# Patient Record
Sex: FEMALE | Race: WHITE | NOT HISPANIC OR LATINO | Employment: OTHER | URBAN - METROPOLITAN AREA
[De-identification: names, ages, dates, MRNs, and addresses within clinical notes are randomized per-mention and may not be internally consistent; named-entity substitution may affect disease eponyms.]

---

## 2018-03-27 ENCOUNTER — HOSPITAL ENCOUNTER (EMERGENCY)
Facility: CLINIC | Age: 68
Discharge: HOME | End: 2018-03-27
Attending: EMERGENCY MEDICINE | Admitting: EMERGENCY MEDICINE
Payer: COMMERCIAL

## 2018-03-27 ENCOUNTER — APPOINTMENT (OUTPATIENT)
Dept: GENERAL RADIOLOGY | Facility: CLINIC | Age: 68
End: 2018-03-27
Attending: EMERGENCY MEDICINE
Payer: COMMERCIAL

## 2018-03-27 ENCOUNTER — APPOINTMENT (OUTPATIENT)
Dept: CT IMAGING | Facility: CLINIC | Age: 68
End: 2018-03-27
Attending: EMERGENCY MEDICINE
Payer: COMMERCIAL

## 2018-03-27 VITALS
TEMPERATURE: 97.9 F | HEART RATE: 81 BPM | DIASTOLIC BLOOD PRESSURE: 82 MMHG | OXYGEN SATURATION: 95 % | HEIGHT: 65 IN | BODY MASS INDEX: 29.99 KG/M2 | WEIGHT: 180 LBS | SYSTOLIC BLOOD PRESSURE: 165 MMHG | RESPIRATION RATE: 19 BRPM

## 2018-03-27 DIAGNOSIS — Z79.01 CHRONIC ANTICOAGULATION: ICD-10-CM

## 2018-03-27 DIAGNOSIS — S01.81XA FACIAL LACERATION, INITIAL ENCOUNTER: ICD-10-CM

## 2018-03-27 DIAGNOSIS — S09.90XA CLOSED HEAD INJURY, INITIAL ENCOUNTER: ICD-10-CM

## 2018-03-27 DIAGNOSIS — S60.222A CONTUSION OF LEFT HAND, INITIAL ENCOUNTER: ICD-10-CM
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
## 2018-03-27 DIAGNOSIS — D64.9 ANEMIA, UNSPECIFIED TYPE: ICD-10-CM

## 2018-03-27 DIAGNOSIS — E87.6 HYPOKALEMIA: ICD-10-CM

## 2018-03-27 DIAGNOSIS — R42 DIZZINESS: ICD-10-CM

## 2018-03-27 DIAGNOSIS — S61.412A SKIN TEAR OF LEFT HAND WITHOUT COMPLICATION, INITIAL ENCOUNTER: ICD-10-CM

## 2018-03-27 LAB
ANION GAP SERPL CALCULATED.3IONS-SCNC: 10 MMOL/L (ref 3–14)
BASOPHILS # BLD AUTO: 0 10E9/L (ref 0–0.2)
BASOPHILS NFR BLD AUTO: 0.2 %
BUN SERPL-MCNC: 12 MG/DL (ref 7–30)
CALCIUM SERPL-MCNC: 8.7 MG/DL (ref 8.5–10.1)
CHLORIDE SERPL-SCNC: 100 MMOL/L (ref 94–109)
CO2 SERPL-SCNC: 24 MMOL/L (ref 20–32)
CREAT SERPL-MCNC: 0.84 MG/DL (ref 0.52–1.04)
DIFFERENTIAL METHOD BLD: ABNORMAL
EOSINOPHIL # BLD AUTO: 0.1 10E9/L (ref 0–0.7)
EOSINOPHIL NFR BLD AUTO: 0.8 %
ERYTHROCYTE [DISTWIDTH] IN BLOOD BY AUTOMATED COUNT: 22.1 % (ref 10–15)
GFR SERPL CREATININE-BSD FRML MDRD: 68 ML/MIN/1.7M2
GLUCOSE SERPL-MCNC: 142 MG/DL (ref 70–99)
HCT VFR BLD AUTO: 29.5 % (ref 35–47)
HEMOCCULT STL QL: NEGATIVE
HGB BLD-MCNC: 9.3 G/DL (ref 11.7–15.7)
IMM GRANULOCYTES # BLD: 0 10E9/L (ref 0–0.4)
IMM GRANULOCYTES NFR BLD: 0.2 %
INR PPP: 1.73 (ref 0.86–1.14)
INTERPRETATION ECG - MUSE: NORMAL
LYMPHOCYTES # BLD AUTO: 2.7 10E9/L (ref 0.8–5.3)
LYMPHOCYTES NFR BLD AUTO: 22.1 %
MCH RBC QN AUTO: 24 PG (ref 26.5–33)
MCHC RBC AUTO-ENTMCNC: 31.5 G/DL (ref 31.5–36.5)
MCV RBC AUTO: 76 FL (ref 78–100)
MONOCYTES # BLD AUTO: 0.8 10E9/L (ref 0–1.3)
MONOCYTES NFR BLD AUTO: 6.3 %
NEUTROPHILS # BLD AUTO: 8.4 10E9/L (ref 1.6–8.3)
NEUTROPHILS NFR BLD AUTO: 70.4 %
NRBC # BLD AUTO: 0 10*3/UL
NRBC BLD AUTO-RTO: 0 /100
PLATELET # BLD AUTO: 337 10E9/L (ref 150–450)
POTASSIUM SERPL-SCNC: 3.3 MMOL/L (ref 3.4–5.3)
RBC # BLD AUTO: 3.87 10E12/L (ref 3.8–5.2)
SODIUM SERPL-SCNC: 134 MMOL/L (ref 133–144)
WBC # BLD AUTO: 12 10E9/L (ref 4–11)

## 2018-03-27 PROCEDURE — 12011 RPR F/E/E/N/L/M 2.5 CM/<: CPT

## 2018-03-27 PROCEDURE — 99285 EMERGENCY DEPT VISIT HI MDM: CPT | Mod: 25

## 2018-03-27 PROCEDURE — 71046 X-RAY EXAM CHEST 2 VIEWS: CPT

## 2018-03-27 PROCEDURE — 85610 PROTHROMBIN TIME: CPT | Performed by: EMERGENCY MEDICINE

## 2018-03-27 PROCEDURE — 96374 THER/PROPH/DIAG INJ IV PUSH: CPT

## 2018-03-27 PROCEDURE — 82272 OCCULT BLD FECES 1-3 TESTS: CPT | Performed by: EMERGENCY MEDICINE

## 2018-03-27 PROCEDURE — 96375 TX/PRO/DX INJ NEW DRUG ADDON: CPT | Mod: 59

## 2018-03-27 PROCEDURE — 93005 ELECTROCARDIOGRAM TRACING: CPT | Mod: XU

## 2018-03-27 PROCEDURE — 80048 BASIC METABOLIC PNL TOTAL CA: CPT | Performed by: EMERGENCY MEDICINE

## 2018-03-27 PROCEDURE — 25000128 H RX IP 250 OP 636

## 2018-03-27 PROCEDURE — 85025 COMPLETE CBC W/AUTO DIFF WBC: CPT | Performed by: EMERGENCY MEDICINE

## 2018-03-27 PROCEDURE — 73130 X-RAY EXAM OF HAND: CPT | Mod: LT

## 2018-03-27 PROCEDURE — 25000132 ZZH RX MED GY IP 250 OP 250 PS 637: Performed by: EMERGENCY MEDICINE

## 2018-03-27 PROCEDURE — 70450 CT HEAD/BRAIN W/O DYE: CPT

## 2018-03-27 RX ORDER — HYDROMORPHONE HYDROCHLORIDE 1 MG/ML
INJECTION, SOLUTION INTRAMUSCULAR; INTRAVENOUS; SUBCUTANEOUS
Status: COMPLETED
Start: 2018-03-27 | End: 2018-03-27

## 2018-03-27 RX ORDER — WARFARIN SODIUM 5 MG/1
5 TABLET ORAL
Status: ON HOLD | COMMUNITY
End: 2024-01-25

## 2018-03-27 RX ORDER — HYDROCODONE BITARTRATE AND ACETAMINOPHEN 5; 325 MG/1; MG/1
1 TABLET ORAL ONCE
Status: COMPLETED | OUTPATIENT
Start: 2018-03-27 | End: 2018-03-27

## 2018-03-27 RX ORDER — POTASSIUM CHLORIDE 1500 MG/1
40 TABLET, EXTENDED RELEASE ORAL ONCE
Status: COMPLETED | OUTPATIENT
Start: 2018-03-27 | End: 2018-03-27

## 2018-03-27 RX ORDER — HYDROMORPHONE HYDROCHLORIDE 1 MG/ML
0.5 INJECTION, SOLUTION INTRAMUSCULAR; INTRAVENOUS; SUBCUTANEOUS
Status: COMPLETED | OUTPATIENT
Start: 2018-03-27 | End: 2018-03-27

## 2018-03-27 RX ORDER — HYDROCODONE BITARTRATE AND ACETAMINOPHEN 5; 325 MG/1; MG/1
1-2 TABLET ORAL EVERY 4 HOURS PRN
Qty: 16 TABLET | Refills: 0 | Status: SHIPPED | OUTPATIENT
Start: 2018-03-27 | End: 2022-11-09

## 2018-03-27 RX ORDER — ONDANSETRON 2 MG/ML
4 INJECTION INTRAMUSCULAR; INTRAVENOUS EVERY 30 MIN PRN
Status: DISCONTINUED | OUTPATIENT
Start: 2018-03-27 | End: 2018-03-28 | Stop reason: HOSPADM

## 2018-03-27 RX ORDER — ONDANSETRON 2 MG/ML
INJECTION INTRAMUSCULAR; INTRAVENOUS
Status: COMPLETED
Start: 2018-03-27 | End: 2018-03-27

## 2018-03-27 RX ORDER — POTASSIUM CHLORIDE 1.5 G/1.58G
40 POWDER, FOR SOLUTION ORAL ONCE
Status: DISCONTINUED | OUTPATIENT
Start: 2018-03-27 | End: 2018-03-27

## 2018-03-27 RX ADMIN — HYDROMORPHONE HYDROCHLORIDE 0.5 MG: 1 INJECTION, SOLUTION INTRAMUSCULAR; INTRAVENOUS; SUBCUTANEOUS at 19:43

## 2018-03-27 RX ADMIN — ONDANSETRON 4 MG: 2 INJECTION INTRAMUSCULAR; INTRAVENOUS at 19:42

## 2018-03-27 RX ADMIN — POTASSIUM CHLORIDE 40 MEQ: 1500 TABLET, EXTENDED RELEASE ORAL at 22:44

## 2018-03-27 RX ADMIN — HYDROCODONE BITARTRATE AND ACETAMINOPHEN 1 TABLET: 5; 325 TABLET ORAL at 22:32

## 2018-03-27 ASSESSMENT — ENCOUNTER SYMPTOMS
CONSTITUTIONAL NEGATIVE: 1
BRUISES/BLEEDS EASILY: 1
DIARRHEA: 0
NAUSEA: 1
LIGHT-HEADEDNESS: 1
CONSTIPATION: 0
VOMITING: 0
WOUND: 1
SHORTNESS OF BREATH: 0
HEADACHES: 0

## 2018-03-27 NOTE — ED AVS SNAPSHOT
Emergency Department    [address] [place] [address]    Phone:  [phone]    Fax:  [phone]                                       Grace Jorgensen   MRN: [id number]    Department:   Emergency Department   Date of Visit:  3/27/2018           After Visit Summary Signature Page     I have received my discharge instructions, and my questions have been answered. I have discussed any challenges I see with this plan with the nurse or doctor.    ..........................................................................................................................................  Patient/Patient Representative Signature      ..........................................................................................................................................  Patient Representative Print Name and Relationship to Patient    ..................................................               ................................................  Date                                            Time    ..........................................................................................................................................  Reviewed by Signature/Title    ...................................................              ..............................................  Date                                                            Time

## 2018-03-27 NOTE — ED PROVIDER NOTES
History     Chief Complaint:  Fall     HPI   Grace Joregnsen is a diabetic 68 year old female with a history of CVA with residual mild balance difficulties on warfarin who presents with family for evaluation of a fall. The patient was walking outside earlier this afternoon around 1530 when she became lightheaded and her legs gave out on her. No other precipitating symptoms. She fell onto her left side and struck her left head and left chest wall during the fall with associated pleuritic left chest wall pain. No dyspnea. She also sustained a laceration over her left temple, a skin tear over her left 5th finger, and scattered abrasions/bruises over her left arm. This fall was not witnessed but she denies loss of consciousness. Her only other complaint is mild nausea. No dentition changes, tongue bite, headache, neck pain, focal numbness or weakness, bowel or bladder changes, or visual deficits. Son brought her here and while in triage prior to evaluation she became lightheaded and near syncopal. Son and staff member caught her before any fall. Son notes patient appears mildly pale since arriving here. No recent illness or travel. No other new symptoms or trauma. Last INR was 1-2 weeks ago and 2.5 per patient's report. She believes here tetanus is out of date.      Allergies:  No known drug allergies    Medications:    Warfarin  Nicotine patch  Oral antihyperglycemic   Iron supplement    Past Medical History:    CVA, multiple, circa 2013  diabetes mellitus    Past Surgical History:    Shoulder arthroplasty, left    Family History:    History reviewed. No pertinent family history.      Social History:  The patient denies tobacco, alcohol, or drug use.        Review of Systems   Constitutional: Negative.    HENT: Negative for dental problem.    Eyes: Negative for visual disturbance.   Respiratory: Negative for shortness of breath.    Cardiovascular: Positive for chest pain.   Gastrointestinal: Positive for nausea.  "Negative for constipation, diarrhea and vomiting.   Genitourinary: Negative.    Musculoskeletal: Positive for gait problem (chronic).   Skin: Positive for pallor and wound.   Neurological: Positive for light-headedness. Negative for syncope and headaches.   Hematological: Bruises/bleeds easily.   All other systems reviewed and are negative.      Physical Exam     Patient Vitals for the past 24 hrs:   BP Temp Temp src Pulse Heart Rate Resp SpO2 Height Weight   03/27/18 2215 - - - - - - 95 % - -   03/27/18 2145 - - - - - - 98 % - -   03/27/18 2130 165/82 - - - 74 - 99 % - -   03/27/18 2115 - - - - 80 - 98 % - -   03/27/18 2100 146/68 - - - 77 19 97 % - -   03/27/18 2045 - - - - 74 18 99 % - -   03/27/18 2030 157/66 - - - 71 24 97 % - -   03/27/18 2015 - - - - 81 19 91 % - -   03/27/18 2000 155/74 - - - 74 12 97 % - -   03/27/18 1945 - - - - 75 16 97 % - -   03/27/18 1930 (!) 140/91 - - - - 26 - - -   03/27/18 1900 135/66 - - - 80 15 94 % - -   03/27/18 1834 (!) 123/94 97.9  F (36.6  C) Oral 81 - 16 96 % 1.651 m (5' 5\") 81.6 kg (180 lb)        Physical Exam  SKIN:  Warm, dry.  Left hand skin avulsion at ulnar aspect, clean.  Ecchymosis and small abrasions of the dorsal left hand at the 4th MCP joint.    HEMATOLOGIC/IMMUNOLOGIC/LYMPHATIC:  No pallor.  No edema.  HENT:  No scalp trauma.  Lacerations at the left temple:  1 cm and 0.5 cm, both clean and no foreign body or active bleeding.  No oral or dental trauma.  EYES:  PERRL, no ocular trauma, normal EOM.  CARDIOVASCULAR:  Normal rate and a regular rhythm.  No murmur.    RESPIRATORY:  No respiratory distress, breath sounds equal and normal.  GASTROINTESTINAL:  Soft nontender abdomen.  MUSCULOSKELETAL:  Full painless ROM of the head/neck/limbs.  Tender left hand at the 4th MCP joint.  Non-tender left wrist.  Cervical spine non-tender.  ROM torso and palpation of left low lateral thorax exacerbates the chest wall pain.  NEUROLOGIC:  Alert, conversant, GCS 15.  Oriented.  " No aphasia or dysarthria.  No gross motor or sensory deficit.  PSYCHIATRIC:  Normal mood.    Emergency Department Course   ECG:  ECG (19:06:37):  Indication: lightheadedness    Rate 77 bpm. SC interval 166. QRS duration 90. QT/QTc 408/461. P-R-T axes 73, 39, 58.   Normal sinus rhythm  Cannot rule out anterior infarct, age undetermined  abnormal ECG  agree with computer interpretation  No previous ECGs available for comparison.    Interpreted at 1925 by Cale Grant MD.    Imaging:  Radiographic findings were communicated with the patient and family who voiced understanding of the findings.  CT Head w/o contrast:  1. Cutaneous injury overlying the left zygomatic arch without definite underlying fracture although the facial bones are not entirely included on these images.  2. Focal cortical hypodensity in the right cerebellum has appearance of a chronic infarct although a more acute area of ischemia cannot be excluded.  3. No evidence of acute intracranial hemorrhage, mass, or herniation.  4. Mild diffuse parenchymal volume loss and white matter changes likely due to chronic microvascular ischemic disease.  Results per Radiology.     XR Chest 2 views:   Streaky left basilar opacity has the appearance of atelectasis. There may be a small left pleural effusion although the costophrenic angles are not included on the lateral view. No definite displaced rib fracture is identified although the images are suboptimal to evaluate for rib fractures. Postoperative changes of left shoulder prosthesis. Cardiac silhouette borderline enlarged. No pneumothorax visualized. Results per Radiology.      XR Hand, Left:  Bones appear osteopenic. No lucent fracture line identified. No significant degenerative changes. Bony alignment within normal limits. Results per Radiology.     Procedure:    Narrative: Procedure: Laceration Repair        LACERATION:  Simple and superficial clean 1 cm and 0.5 cm lacerations.      LOCATION:  Left  temple      ANESTHESIA:  Local using 0.25% marcaine plain total of 2 mLs      PREPARATION:  Irrigation and Scrubbing with Normal Saline and Shur Clens      DEBRIDEMENT:  no debridement      CLOSURE:  Wounds were closed with One Layer.  Skin closed with 3 x 5.0 Ethylon using interrupted sutures to the larger wound, and 1x5.0 Ethylon to the smaller wound.    Laboratory:  Laboratory findings were communicated with the patient and family who voiced understanding of the findings.  CBC w/diff: WBC 12.0 (H), Hgb 9.3 (L), Hct 29.6 (L), ANC 8.4 (H), o/w WNL (Plt 337)  BMP: Glu 142 (H), K 3.3 (L), o/w WNL (Cr 0.84)  INR: 1.73  POC Occult blood stool: negative      Interventions:  1942: ondansetron 4mg, IV  1943: hydromorphone (Dilaudid) 0.5mg, IV  2232: Norco 5-325 mg tablet, PO   2244: KCl 40mEq, PO    Emergency Department Course:  Past medical records, nursing notes, and vitals reviewed.   1845: I performed an exam of the patient as documented above.    Peripheral IV access established. Blood drawn and sent. The above EKG, imaging study, and labs  were obtained. Results above.  Tetanus vaccination status reviewed: tetanus status unknown to the patient, Td vaccination indicated and given today.     2007: I rechecked patient. Updated on findings and plan. Chaperoned rectal exam performed as above.  2042: I rechecked patient. Laceration cleansed, closed, and dressed as above.   The patient was ambulated here in the emergency department without significant difficulty.  2223: I rechecked patient. Findings and plan explained to the Patient and family. Patient discharged home with instructions regarding supportive care, medications, and reasons to return. The importance of close follow-up was reviewed.            Impression & Plan    Medical Decision Making:  Grace Jorgensen is a 68 year old female presents after fall from standing height which was induced by a dizzy episode. Given her anticoagulation and head/facial injury  imaging was performed as above, which was negative. The facial wounds were repaired as above. She also suffered a left hand skin avulsion and imaging performed to screen for fracture, which was negative. Otherwise incidentally noted to be anemic and with her anticoagulation opted for rectal exam which was hemoccult negative. So I do not think she is suffering from GI bleeding causing her episode. Incidental hypokalemia which was replaced orally. Given chest pain with palpation, rib fracture or pneumothorax were concerns. See imaging. In the meantime she was resting and ambulated and felt well enough to be discharged. She was administered a dose of Norco and I prescribed another 16 tablets and otherwise advised to return for any concerns.    Diagnosis:    ICD-10-CM    1. Fall, initial encounter W19.XXXA    2. Dizziness R42    3. Closed head injury, initial encounter S09.90XA    4. Facial laceration, initial encounter S01.81XA    5. Skin tear of left hand without complication, initial encounter S61.412A    6. Contusion of left hand, initial encounter S60.222A    7. Hypokalemia E87.6    8. Anemia, unspecified type D64.9    9. Chronic anticoagulation Z79.01        Disposition:   discharged to home    Discharge Medications:  Discharge Medication List as of 3/27/2018 10:47 PM      START taking these medications    Details   HYDROcodone-acetaminophen (NORCO) 5-325 MG per tablet Take 1-2 tablets by mouth every 4 hours as needed for moderate to severe pain, Disp-16 tablet, R-0, Local Print              Scribe Disclosure:  I, Kristofer Bundy, am serving as a scribe at 6:50 PM on 3/27/2018 to document services personally performed by Cale Grant MD based on my observations and the provider's statements to me.    Kristofer Bundy  3/27/2018   EMERGENCY DEPARTMENT      Cale Grant MD  03/28/18 5251

## 2018-03-27 NOTE — ED AVS SNAPSHOT
Emergency Department    [address] [place] [id number]    Phone:  [phone]    Fax:  [phone]                                       Grace Jorgensen   MRN: [id number]    Department:   Emergency Department   Date of Visit:  3/27/2018           Patient Information     Date Of Birth          1950        Your diagnoses for this visit were:     Fall, initial encounter     Dizziness     Closed head injury, initial encounter     Facial laceration, initial encounter     Skin tear of left hand without complication, initial encounter     Contusion of left hand, initial encounter     Hypokalemia     Anemia, unspecified type     Chronic anticoagulation        You were seen by Cale Grant MD.      Follow-up Information     Please follow up.    Why:  norco or tylenol and ice for pain - use the walker - return if any concerns        Discharge Instructions         Dizziness (Uncertain Cause)  Dizziness is a common symptom. It may be described as lightheadedness, spinning, or feeling like you are going to faint. Dizziness can have many causes.  Be sure to tell the healthcare provider about:    All medicines you take, including prescription, over-the-counter, herbs, and supplements    Any other symptoms you have    Any health problems you are being treated for    Anything that causes the dizziness to get worse or better  Today's exam did not show an exact cause for your dizziness. Other tests may be needed. Follow up with your healthcare provider.  Home care    Dizziness that occurs with sudden standing may be a sign of mild dehydration. Drink extra fluids for the next few days.    If you recently started a new medicine, stopped a medicine, or had the dose of a current medicine changed, talk with the prescribing healthcare provider. Your medicine plan may need adjustment.    If dizziness lasts more than a few seconds, sit or lie down until it passes. This may help prevent injury in case you  pass out.    Do not drive or use power tools or dangerous equipment until you have had no dizziness for at least 48 hours.  Follow-up care  Follow up with your healthcare provider for further evaluation within the next 7 days or as advised.  When to seek medical advice  Call your healthcare provider for any of the following:    Worsening of symptoms or new symptoms    Passing out or seizure    Repeated vomiting    Headache    Palpitations (the sense that your heart is fluttering or beating fast or hard)    Shortness of breath    Blood in vomit or stool (black or red color)    Weakness of an arm or leg or one side of the face    Vision or hearing changes    Trouble walking or speaking    Chest, arm, neck, back, or jaw pain  Date Last Reviewed: 8/23/2015 2000-2017 [employer]. [address] [place] [address]. All rights reserved. This information is not intended as a substitute for professional medical care. Always follow your healthcare professional's instructions.    Discharge Instructions  Head Injury    You have been seen today for a head injury. Your evaluation included a history and physical examination. You may have had a CT (CAT) scan performed, though most head injuries do not require a scan. Based on this evaluation, your provider today does not feel that your head injury is serious.    Generally, every Emergency Department visit should have a follow-up clinic visit with either a primary or a specialty clinic/provider. Please follow-up as instructed by your emergency provider today.  Return to the Emergency Department if:    You are confused or you are not acting right.    Your headache gets worse or you start to have a really bad headache even with your recommended treatment plan.    You vomit (throw up) more than once.    You have a seizure.    You have trouble walking.    You have weakness or paralysis (cannot move) in an arm or a leg.    You have blood or fluid coming from your  ears or nose.    You have new symptoms or anything that worries you.    Sleeping:  It is okay for you to sleep, but someone should wake you up if instructed by your provider, and someone should check on you at your usual time to wake up.     Activity:    Do not drive for at least 24 hours.    Do not drive if you have dizzy spells or trouble concentrating, or remembering things.    Do not return to any contact sports until cleared by your regular provider.     MORE INFORMATION:    Concussion:  A concussion is a minor head injury that may cause temporary problems with the way the brain works. Although concussions are important, they are generally not an emergency or a reason that a person needs to be hospitalized. Some concussion symptoms include confusion, amnesia (forgetful), nausea (sick to your stomach) and vomiting (throwing up), dizziness, fatigue, memory or concentration problems, irritability and sleep problems. For most people, concussions are mild and temporary but some will have more severe and persistent symptoms that require on-going care and treatment.  CT Scans: Your evaluation today may have included a CT scan (CAT scan) to look for things like bleeding or a skull fracture (broken bone).  CT scans involve radiation and too many CT scans can cause serious health problems like cancer, especially in children.  Because of this, your provider may not have ordered a CT scan today if they think you are at low risk for a serious or life threatening problem.    If you were given a prescription for medicine here today, be sure to read all of the information (including the package insert) that comes with your prescription.  This will include important information about the medicine, its side effects, and any warnings that you need to know about.  The pharmacist who fills the prescription can provide more information and answer questions you may have about the medicine.  If you have questions or concerns that the  pharmacist cannot address, please call or return to the Emergency Department.     Remember that you can always come back to the Emergency Department if you are not able to see your regular provider in the amount of time listed above, if you get any new symptoms, or if there is anything that worries you.    Discharge Instructions  Laceration (Cut)    You were seen today for a laceration (cut).  Your provider examined your laceration for any problems such a buried foreign body (like glass, a splinter, or gravel), or injury to blood vessels, tendons, and nerves.  Your provider may have also rinsed and/or scrubbed your laceration to help prevent an infection. It may not be possible to find all problems with your laceration on the first visit; occasionally foreign bodies or a tendon injury can go undetected.    Your laceration may have been closed in one of several ways:    No closure: many wounds will heal just fine without closure.    Stitches: regular stitches that require removal.    Staples: skin staples are often used in the scalp/head.    Wound adhesive (glue): skin glue can be used for certain lacerations and doesn t require removal.    Wound strips (aka Butterfly bandages or steri-strips): these are bandages that help to close a wound.    Absorbable stitches:  dissolving  stitches that go away on their own and usually don t require removal.    A small percentage of wounds will develop an infection regardless of how well the wound is cared for. Antibiotics are generally not indicated to prevent an infection so are only given for a small number of high-risk wounds. Some lacerations are too high risk to close, and are left open to heal because closure can increase the likelihood that an infection will develop.    Remember that all lacerations, no matter how expertly repaired, will cause scarring. We consider many factors, techniques, and materials, in our efforts to provide the best possible cosmetic  outcome.    Generally, every Emergency Department visit should have a follow-up clinic visit with either a primary or a specialty clinic/provider. Please follow-up as instructed by your emergency provider today.     Return to the Emergency Department right away if:    You have more redness, swelling, pain, drainage (pus), a bad smell, or red streaking from your laceration as these symptoms could indicate an infection.    You have a fever of 100.4 F or more.    You have bleeding that you cannot stop at home. If your cut starts to bleed, hold pressure on the bleeding area with a clean cloth or put pressure over the bandage.  If the bleeding does not stop after using constant pressure for 30 minutes, you should return to the Emergency Department for further treatment.    An area past the laceration is cool, pale, or blue compared with the other side, or has a slower return of color when squeezed.    Your dressing seems too tight or starts to get uncomfortable or painful. For children, signs of a problem might be irritability or restlessness.    You have loss of normal function or use of an area, such as being unable to straighten or bend a finger normally.    You have a numb area past the laceration.    Return to the Emergency Department or see your regular provider if:    The laceration starts to come open.     You have something coming out of the cut or a feeling that there is something in the laceration.    Your wound will not heal, or keeps breaking open. There can always be glass, wood, dirt or other things in any wound.  They will not always show up, even on x-rays.  If a wound does not heal, this may be why, and it is important to follow-up with your regular provider.    Home Care:    Take your dressing off in 12-24 hours, or as instructed by your provider, to check your laceration. Remove the dressing sooner if it seems too tight or painful, or if it is getting numb, tingly, or pale past the dressing.    Gently  wash your laceration 1-2 times daily with clean water and mild soap. It is okay to shower or run clean water over the laceration, but do not let the laceration soak in water (no swimming).    If your laceration was closed with wound adhesive or strips: pat it dry and leave it open to the air. For all other repairs: after you wash your laceration, or at least 2 times a day, apply antibiotic ointment (such as Neosporin  or Bacitracin ) to the laceration, then cover it with a Band-Aid  or gauze.    Keep the laceration clean. Wear gloves or other protective clothing if you are around dirt.    Follow-up for removal:    If your wound was closed with staples or regular stitches, they need to be removed according to the instructions and timeline specified by your provider today.    If your wound was closed with absorbable ( dissolving ) sutures, they should fall out, dissolve, or not be visible in about one week. If they are still visible, then they should be removed according to the instructions and timeline specified by your provider today.    Scars:  To help minimize scarring:    Wear sunscreen over the healed laceration when out in the sun.    Massage the area regularly once healed.    You may apply Vitamin E to the healed wound.    Wait. Scars improve in appearance over months and years.    If you were given a prescription for medicine here today, be sure to read all of the information (including the package insert) that comes with your prescription.  This will include important information about the medicine, its side effects, and any warnings that you need to know about.  The pharmacist who fills the prescription can provide more information and answer questions you may have about the medicine.  If you have questions or concerns that the pharmacist cannot address, please call or return to the Emergency Department.       Remember that you can always come back to the Emergency Department if you are not able to see your  regular provider in the amount of time listed above, if you get any new symptoms, or if there is anything that worries you.      Hypokalemia  Hypokalemia means a low level of potassium in the blood. This most often occurs in people who take diuretics (water pills). It can also occur due to severe vomiting or diarrhea.  It is also seen in people who take laxatives for long periods of time. It can sometimes be associated with hypomagnesemia (low magnesium) which needs to be corrected before your potassium levels can be fixed.  A mild case usually causes no symptoms. It is only found with blood testing. More severe potassium loss causes generalized weakness, muscle or abdominal cramping, heart palpitations (rapid or irregular heartbeats), low blood pressure, specific muscle weakness, and in some people who are paralyzed.   Home care    Take any potassium supplements prescribed.    Eat foods rich in potassium. The highest amount is found in avocado, baked potatoes, spinach, cantaloupe, cod, halibut, salmon, and scallops. White, red, or weathers beans are also very good sources. A modest amount is found in orange juice, bananas, carrots, and tomato juice.    If you take certain types of diuretics, you will also need to take potassium supplements. If you take a diuretic, discuss potassium supplements with your doctor.  Follow-up care  Follow up with your healthcare provider for a repeat blood test within the next week or as advised by our staff.  When to seek medical advice  Call your healthcare provider if any of the following occur:    Increased weakness, fatigue, muscle cramps    Dizziness  Call 911  Call 911 if any of the following occur:    Irregular heartbeat, extra beats or very fast heart rate    Loss of consciousness  Date Last Reviewed: 7/1/2017 2000-2017 [employer]. [address] [place] [address], [place] [address]. All rights reserved. This information is not intended as a substitute for professional  medical care. Always follow your healthcare professional's instructions.          24 Hour Appointment Hotline       To make an appointment at any [facility], call [phone] ([phone]). If you don't have a family doctor or clinic, we will help you find one. [facility] are conveniently located to serve the needs of you and your family.             Review of your medicines      START taking        Dose / Directions Last dose taken    HYDROcodone-acetaminophen 5-325 MG per tablet   Commonly known as:  NORCO   Dose:  1-2 tablet   Quantity:  16 tablet        Take 1-2 tablets by mouth every 4 hours as needed for moderate to severe pain   Refills:  0          Our records show that you are taking the medicines listed below. If these are incorrect, please call your family doctor or clinic.        Dose / Directions Last dose taken    WARFARIN SODIUM PO   Dose:  5 mg        Take 5 mg by mouth 2 times daily   Refills:  0                Prescriptions were sent or printed at these locations (1 Prescription)                   Other Prescriptions                Printed at Department/Unit printer (1 of 1)         HYDROcodone-acetaminophen (NORCO) 5-325 MG per tablet                Procedures and tests performed during your visit     Basic metabolic panel    CBC with platelets differential    CT Head w/o Contrast    EKG 12-lead, tracing only    Hand XR, G/E 3 views, left    INR    Occult blood stool    Peripheral IV catheter    XR Chest 2 Views      Orders Needing Specimen Collection     None      Pending Results     No orders found from 3/25/2018 to 3/28/2018.            Pending Culture Results     No orders found from 3/25/2018 to 3/28/2018.            Pending Results Instructions     If you had any lab results that were not finalized at the time of your Discharge, you can call the ED Lab Result RN at [phone]. You will be contacted by this team for any positive Lab results or changes in treatment. The  nurses are available 7 days a week from 10A to 6:30P.  You can leave a message 24 hours per day and they will return your call.        Test Results From Your Hospital Stay        3/27/2018  7:20 PM      Component Results     Component Value Ref Range & Units Status    WBC 12.0 (H) 4.0 - 11.0 10e9/L Final    RBC Count 3.87 3.8 - 5.2 10e12/L Final    Hemoglobin 9.3 (L) 11.7 - 15.7 g/dL Final    Hematocrit 29.5 (L) 35.0 - 47.0 % Final    MCV 76 (L) 78 - 100 fl Final    MCH 24.0 (L) 26.5 - 33.0 pg Final    MCHC 31.5 31.5 - 36.5 g/dL Final    RDW 22.1 (H) 10.0 - 15.0 % Final    Platelet Count 337 150 - 450 10e9/L Final    Diff Method Automated Method  Final    % Neutrophils 70.4 % Final    % Lymphocytes 22.1 % Final    % Monocytes 6.3 % Final    % Eosinophils 0.8 % Final    % Basophils 0.2 % Final    % Immature Granulocytes 0.2 % Final    Nucleated RBCs 0 0 /100 Final    Absolute Neutrophil 8.4 (H) 1.6 - 8.3 10e9/L Final    Absolute Lymphocytes 2.7 0.8 - 5.3 10e9/L Final    Absolute Monocytes 0.8 0.0 - 1.3 10e9/L Final    Absolute Eosinophils 0.1 0.0 - 0.7 10e9/L Final    Absolute Basophils 0.0 0.0 - 0.2 10e9/L Final    Abs Immature Granulocytes 0.0 0 - 0.4 10e9/L Final    Absolute Nucleated RBC 0.0  Final         3/27/2018  7:34 PM      Component Results     Component Value Ref Range & Units Status    INR 1.73 (H) 0.86 - 1.14 Final         3/27/2018  7:35 PM      Component Results     Component Value Ref Range & Units Status    Sodium 134 133 - 144 mmol/L Final    Potassium 3.3 (L) 3.4 - 5.3 mmol/L Final    Chloride 100 94 - 109 mmol/L Final    Carbon Dioxide 24 20 - 32 mmol/L Final    Anion Gap 10 3 - 14 mmol/L Final    Glucose 142 (H) 70 - 99 mg/dL Final    Urea Nitrogen 12 7 - 30 mg/dL Final    Creatinine 0.84 0.52 - 1.04 mg/dL Final    GFR Estimate 68 >60 mL/min/1.7m2 Final    Non  GFR Calc    GFR Estimate If Black 82 >60 mL/min/1.7m2 Final    African American GFR Calc    Calcium 8.7 8.5 - 10.1 mg/dL  Final         3/27/2018  8:20 PM      Narrative     CHEST TWO VIEW   3/27/2018 7:27 PM     HISTORY: Fall, left anterolateral rib pain.     COMPARISON: None.        Impression     IMPRESSION: Streaky left basilar opacity has the appearance of  atelectasis. There may be a small left pleural effusion although the  costophrenic angles are not included on the lateral view. No definite  displaced rib fracture is identified although the images are  suboptimal to evaluate for rib fractures. Postoperative changes of  left shoulder prosthesis. Cardiac silhouette borderline enlarged. No  pneumothorax visualized.    SOLITARIO GURROLA MD         3/27/2018  7:41 PM      Narrative     CT SCAN OF THE HEAD WITHOUT CONTRAST   3/27/2018 7:32 PM     HISTORY: Fall, left facial trauma, on coumadin.     TECHNIQUE:  Axial images of the head and coronal reformations without  IV contrast material. Radiation dose for this scan was reduced using  automated exposure control, adjustment of the mA and/or kV according  to patient size, or iterative reconstruction technique.    COMPARISON: None.    FINDINGS: There is no evidence of intracranial hemorrhage, mass, acute  infarct or anomaly. The ventricles are normal in size, shape and  configuration. Mild diffuse parenchymal volume loss. Mild patchy  periventricular white matter hypodensities which are nonspecific, but  likely related to chronic microvascular ischemic disease. Focal  cortical hypodensity in the inferior left cerebellum.    Cutaneous irregularity and injury over the left zygomatic arch without  underlying fracture identified of the facial bones although the orbits  are not entirely included on these images.    Small air-fluid layer in the right sphenoid sinus. The bony calvarium  and bones of the skull base appear intact.         Impression     IMPRESSION:     1. Cutaneous injury overlying the left zygomatic arch without definite  underlying fracture although the facial bones are not  entirely  included on these images.  2. Focal cortical hypodensity in the right cerebellum has appearance  of a chronic infarct although a more acute area of ischemia cannot be  excluded.  3. No evidence of acute intracranial hemorrhage, mass, or herniation.  4. Mild diffuse parenchymal volume loss and white matter changes  likely due to chronic microvascular ischemic disease.       SOLITARIO GURROLA MD         3/27/2018  8:20 PM      Narrative     HAND LEFT THREE OR MORE VIEWS    3/27/2018 7:26 PM     HISTORY: Fall, hand pain.    COMPARISON: None.        Impression     IMPRESSION: Bones appear osteopenic. No lucent fracture line  identified. No significant degenerative changes. Bony alignment within  normal limits.    SOLITARIO GURROLA MD         3/27/2018  8:19 PM      Component Results     Component Value Ref Range & Units Status    Occult Blood Negative NEG^Negative Final                Clinical Quality Measure: Blood Pressure Screening     Your blood pressure was checked while you were in the emergency department today. The last reading we obtained was  BP: 165/82 . Please read the guidelines below about what these numbers mean and what you should do about them.  If your systolic blood pressure (the top number) is less than 120 and your diastolic blood pressure (the bottom number) is less than 80, then your blood pressure is normal. There is nothing more that you need to do about it.  If your systolic blood pressure (the top number) is 120-139 or your diastolic blood pressure (the bottom number) is 80-89, your blood pressure may be higher than it should be. You should have your blood pressure rechecked within a year by a primary care provider.  If your systolic blood pressure (the top number) is 140 or greater or your diastolic blood pressure (the bottom number) is 90 or greater, you may have high blood pressure. High blood pressure is treatable, but if left untreated over time it can put you at risk for heart attack,  "stroke, or kidney failure. You should have your blood pressure rechecked by a primary care provider within the next 4 weeks.  If your provider in the emergency department today gave you specific instructions to follow-up with your doctor or provider even sooner than that, you should follow that instruction and not wait for up to 4 weeks for your follow-up visit.        Thank you for choosing [facility]       Thank you for choosing [facility] for your care. Our goal is always to provide you with excellent care. Hearing back from our patients is one way we can continue to improve our services. Please take a few minutes to complete the written survey that you may receive in the mail after you visit with us. Thank you!        [employer] Information     [employer] lets you send messages to your doctor, view your test results, renew your prescriptions, schedule appointments and more. To sign up, go to [email or web address] . Click on \"Log in\" on the left side of the screen, which will take you to the Welcome page. Then click on \"Sign up Now\" on the right side of the page.     You will be asked to enter the access code listed below, as well as some personal information. Please follow the directions to create your username and password.     Your access code is: [email or web address]  Expires: 2018 10:47 PM     Your access code will  in 90 days. If you need help or a new code, please call your [facility] clinic or [phone].        Care EveryWhere ID     This is your Care EveryWhere ID. This could be used by other organizations to access your [facility] medical records  [id number]        Equal Access to Services     MAUREEN BEASLEY : Hadii denver ramey Sojoe, waaxda luqadaha, qaybta kaalmada afia, lavonne engel . So [facility] [phone].    ATENCIÓN: Si habla español, tiene a martinez disposición servicios gratuitos de asistencia lingüística. Llame al [phone].    We comply with applicable federal " civil rights laws and [place] laws. We do not discriminate on the basis of race, color, national origin, age, disability, sex, sexual orientation, or gender identity.            After Visit Summary       This is your record. Keep this with you and show to your community pharmacist(s) and doctor(s) at your next visit.

## 2018-03-28 NOTE — DISCHARGE INSTRUCTIONS
Dizziness (Uncertain Cause)  Dizziness is a common symptom. It may be described as lightheadedness, spinning, or feeling like you are going to faint. Dizziness can have many causes.  Be sure to tell the healthcare provider about:    All medicines you take, including prescription, over-the-counter, herbs, and supplements    Any other symptoms you have    Any health problems you are being treated for    Anything that causes the dizziness to get worse or better  Today's exam did not show an exact cause for your dizziness. Other tests may be needed. Follow up with your healthcare provider.  Home care    Dizziness that occurs with sudden standing may be a sign of mild dehydration. Drink extra fluids for the next few days.    If you recently started a new medicine, stopped a medicine, or had the dose of a current medicine changed, talk with the prescribing healthcare provider. Your medicine plan may need adjustment.    If dizziness lasts more than a few seconds, sit or lie down until it passes. This may help prevent injury in case you pass out.    Do not drive or use power tools or dangerous equipment until you have had no dizziness for at least 48 hours.  Follow-up care  Follow up with your healthcare provider for further evaluation within the next 7 days or as advised.  When to seek medical advice  Call your healthcare provider for any of the following:    Worsening of symptoms or new symptoms    Passing out or seizure    Repeated vomiting    Headache    Palpitations (the sense that your heart is fluttering or beating fast or hard)    Shortness of breath    Blood in vomit or stool (black or red color)    Weakness of an arm or leg or one side of the face    Vision or hearing changes    Trouble walking or speaking    Chest, arm, neck, back, or jaw pain  Date Last Reviewed: 8/23/2015 2000-2017 [employer]. [address] [place] [address], [place] [address]. All rights reserved. This information is not intended as  a substitute for professional medical care. Always follow your healthcare professional's instructions.    Discharge Instructions  Head Injury    You have been seen today for a head injury. Your evaluation included a history and physical examination. You may have had a CT (CAT) scan performed, though most head injuries do not require a scan. Based on this evaluation, your provider today does not feel that your head injury is serious.    Generally, every Emergency Department visit should have a follow-up clinic visit with either a primary or a specialty clinic/provider. Please follow-up as instructed by your emergency provider today.  Return to the Emergency Department if:    You are confused or you are not acting right.    Your headache gets worse or you start to have a really bad headache even with your recommended treatment plan.    You vomit (throw up) more than once.    You have a seizure.    You have trouble walking.    You have weakness or paralysis (cannot move) in an arm or a leg.    You have blood or fluid coming from your ears or nose.    You have new symptoms or anything that worries you.    Sleeping:  It is okay for you to sleep, but someone should wake you up if instructed by your provider, and someone should check on you at your usual time to wake up.     Activity:    Do not drive for at least 24 hours.    Do not drive if you have dizzy spells or trouble concentrating, or remembering things.    Do not return to any contact sports until cleared by your regular provider.     MORE INFORMATION:    Concussion:  A concussion is a minor head injury that may cause temporary problems with the way the brain works. Although concussions are important, they are generally not an emergency or a reason that a person needs to be hospitalized. Some concussion symptoms include confusion, amnesia (forgetful), nausea (sick to your stomach) and vomiting (throwing up), dizziness, fatigue, memory or concentration problems,  irritability and sleep problems. For most people, concussions are mild and temporary but some will have more severe and persistent symptoms that require on-going care and treatment.  CT Scans: Your evaluation today may have included a CT scan (CAT scan) to look for things like bleeding or a skull fracture (broken bone).  CT scans involve radiation and too many CT scans can cause serious health problems like cancer, especially in children.  Because of this, your provider may not have ordered a CT scan today if they think you are at low risk for a serious or life threatening problem.    If you were given a prescription for medicine here today, be sure to read all of the information (including the package insert) that comes with your prescription.  This will include important information about the medicine, its side effects, and any warnings that you need to know about.  The pharmacist who fills the prescription can provide more information and answer questions you may have about the medicine.  If you have questions or concerns that the pharmacist cannot address, please call or return to the Emergency Department.     Remember that you can always come back to the Emergency Department if you are not able to see your regular provider in the amount of time listed above, if you get any new symptoms, or if there is anything that worries you.    Discharge Instructions  Laceration (Cut)    You were seen today for a laceration (cut).  Your provider examined your laceration for any problems such a buried foreign body (like glass, a splinter, or gravel), or injury to blood vessels, tendons, and nerves.  Your provider may have also rinsed and/or scrubbed your laceration to help prevent an infection. It may not be possible to find all problems with your laceration on the first visit; occasionally foreign bodies or a tendon injury can go undetected.    Your laceration may have been closed in one of several ways:    No closure: many  wounds will heal just fine without closure.    Stitches: regular stitches that require removal.    Staples: skin staples are often used in the scalp/head.    Wound adhesive (glue): skin glue can be used for certain lacerations and doesn t require removal.    Wound strips (aka Butterfly bandages or steri-strips): these are bandages that help to close a wound.    Absorbable stitches:  dissolving  stitches that go away on their own and usually don t require removal.    A small percentage of wounds will develop an infection regardless of how well the wound is cared for. Antibiotics are generally not indicated to prevent an infection so are only given for a small number of high-risk wounds. Some lacerations are too high risk to close, and are left open to heal because closure can increase the likelihood that an infection will develop.    Remember that all lacerations, no matter how expertly repaired, will cause scarring. We consider many factors, techniques, and materials, in our efforts to provide the best possible cosmetic outcome.    Generally, every Emergency Department visit should have a follow-up clinic visit with either a primary or a specialty clinic/provider. Please follow-up as instructed by your emergency provider today.     Return to the Emergency Department right away if:    You have more redness, swelling, pain, drainage (pus), a bad smell, or red streaking from your laceration as these symptoms could indicate an infection.    You have a fever of 100.4 F or more.    You have bleeding that you cannot stop at home. If your cut starts to bleed, hold pressure on the bleeding area with a clean cloth or put pressure over the bandage.  If the bleeding does not stop after using constant pressure for 30 minutes, you should return to the Emergency Department for further treatment.    An area past the laceration is cool, pale, or blue compared with the other side, or has a slower return of color when  squeezed.    Your dressing seems too tight or starts to get uncomfortable or painful. For children, signs of a problem might be irritability or restlessness.    You have loss of normal function or use of an area, such as being unable to straighten or bend a finger normally.    You have a numb area past the laceration.    Return to the Emergency Department or see your regular provider if:    The laceration starts to come open.     You have something coming out of the cut or a feeling that there is something in the laceration.    Your wound will not heal, or keeps breaking open. There can always be glass, wood, dirt or other things in any wound.  They will not always show up, even on x-rays.  If a wound does not heal, this may be why, and it is important to follow-up with your regular provider.    Home Care:    Take your dressing off in 12-24 hours, or as instructed by your provider, to check your laceration. Remove the dressing sooner if it seems too tight or painful, or if it is getting numb, tingly, or pale past the dressing.    Gently wash your laceration 1-2 times daily with clean water and mild soap. It is okay to shower or run clean water over the laceration, but do not let the laceration soak in water (no swimming).    If your laceration was closed with wound adhesive or strips: pat it dry and leave it open to the air. For all other repairs: after you wash your laceration, or at least 2 times a day, apply antibiotic ointment (such as Neosporin  or Bacitracin ) to the laceration, then cover it with a Band-Aid  or gauze.    Keep the laceration clean. Wear gloves or other protective clothing if you are around dirt.    Follow-up for removal:    If your wound was closed with staples or regular stitches, they need to be removed according to the instructions and timeline specified by your provider today.    If your wound was closed with absorbable ( dissolving ) sutures, they should fall out, dissolve, or not be  visible in about one week. If they are still visible, then they should be removed according to the instructions and timeline specified by your provider today.    Scars:  To help minimize scarring:    Wear sunscreen over the healed laceration when out in the sun.    Massage the area regularly once healed.    You may apply Vitamin E to the healed wound.    Wait. Scars improve in appearance over months and years.    If you were given a prescription for medicine here today, be sure to read all of the information (including the package insert) that comes with your prescription.  This will include important information about the medicine, its side effects, and any warnings that you need to know about.  The pharmacist who fills the prescription can provide more information and answer questions you may have about the medicine.  If you have questions or concerns that the pharmacist cannot address, please call or return to the Emergency Department.       Remember that you can always come back to the Emergency Department if you are not able to see your regular provider in the amount of time listed above, if you get any new symptoms, or if there is anything that worries you.      Hypokalemia  Hypokalemia means a low level of potassium in the blood. This most often occurs in people who take diuretics (water pills). It can also occur due to severe vomiting or diarrhea.  It is also seen in people who take laxatives for long periods of time. It can sometimes be associated with hypomagnesemia (low magnesium) which needs to be corrected before your potassium levels can be fixed.  A mild case usually causes no symptoms. It is only found with blood testing. More severe potassium loss causes generalized weakness, muscle or abdominal cramping, heart palpitations (rapid or irregular heartbeats), low blood pressure, specific muscle weakness, and in some people who are paralyzed.   Home care    Take any potassium supplements  prescribed.    Eat foods rich in potassium. The highest amount is found in avocado, baked potatoes, spinach, cantaloupe, cod, halibut, salmon, and scallops. White, red, or weathers beans are also very good sources. A modest amount is found in orange juice, bananas, carrots, and tomato juice.    If you take certain types of diuretics, you will also need to take potassium supplements. If you take a diuretic, discuss potassium supplements with your doctor.  Follow-up care  Follow up with your healthcare provider for a repeat blood test within the next week or as advised by our staff.  When to seek medical advice  Call your healthcare provider if any of the following occur:    Increased weakness, fatigue, muscle cramps    Dizziness  Call 911  Call 911 if any of the following occur:    Irregular heartbeat, extra beats or very fast heart rate    Loss of consciousness  Date Last Reviewed: 7/1/2017 2000-2017 [employer]. [address] [place] [address]. All rights reserved. This information is not intended as a substitute for professional medical care. Always follow your healthcare professional's instructions.

## 2021-11-06 ENCOUNTER — HOSPITAL ENCOUNTER (EMERGENCY)
Facility: CLINIC | Age: 71
Discharge: HOME | End: 2021-11-07
Attending: EMERGENCY MEDICINE | Admitting: EMERGENCY MEDICINE
Payer: COMMERCIAL

## 2021-11-06 DIAGNOSIS — R79.1 SUPRATHERAPEUTIC INR: ICD-10-CM

## 2021-11-06 DIAGNOSIS — R58 BLEEDING: ICD-10-CM

## 2021-11-06 PROCEDURE — 36415 COLL VENOUS BLD VENIPUNCTURE: CPT | Performed by: STUDENT IN AN ORGANIZED HEALTH CARE EDUCATION/TRAINING PROGRAM

## 2021-11-06 PROCEDURE — 85610 PROTHROMBIN TIME: CPT | Performed by: STUDENT IN AN ORGANIZED HEALTH CARE EDUCATION/TRAINING PROGRAM

## 2021-11-06 PROCEDURE — 85025 COMPLETE CBC W/AUTO DIFF WBC: CPT | Performed by: STUDENT IN AN ORGANIZED HEALTH CARE EDUCATION/TRAINING PROGRAM

## 2021-11-06 PROCEDURE — 272N000397 HC DRESSING QUICK CLOT 4X4

## 2021-11-06 PROCEDURE — 99282 EMERGENCY DEPT VISIT SF MDM: CPT

## 2021-11-06 RX ORDER — LIDOCAINE HYDROCHLORIDE AND EPINEPHRINE 10; 10 MG/ML; UG/ML
5 INJECTION, SOLUTION INFILTRATION; PERINEURAL ONCE
Status: DISCONTINUED | OUTPATIENT
Start: 2021-11-06 | End: 2021-11-07 | Stop reason: HOSPADM

## 2021-11-06 ASSESSMENT — ENCOUNTER SYMPTOMS
CHILLS: 0
WOUND: 1
FEVER: 0

## 2021-11-07 VITALS
DIASTOLIC BLOOD PRESSURE: 67 MMHG | WEIGHT: 190 LBS | TEMPERATURE: 97.6 F | OXYGEN SATURATION: 92 % | HEART RATE: 65 BPM | BODY MASS INDEX: 31.62 KG/M2 | SYSTOLIC BLOOD PRESSURE: 118 MMHG | RESPIRATION RATE: 18 BRPM

## 2021-11-07 LAB
BASOPHILS # BLD AUTO: 0 10E3/UL (ref 0–0.2)
BASOPHILS NFR BLD AUTO: 0 %
EOSINOPHIL # BLD AUTO: 0 10E3/UL (ref 0–0.7)
EOSINOPHIL NFR BLD AUTO: 0 %
ERYTHROCYTE [DISTWIDTH] IN BLOOD BY AUTOMATED COUNT: 13.4 % (ref 10–15)
HCT VFR BLD AUTO: 36.7 % (ref 35–47)
HGB BLD-MCNC: 11.6 G/DL (ref 11.7–15.7)
IMM GRANULOCYTES # BLD: 0 10E3/UL
IMM GRANULOCYTES NFR BLD: 0 %
INR PPP: 4.89 (ref 0.85–1.15)
LYMPHOCYTES # BLD AUTO: 4.9 10E3/UL (ref 0.8–5.3)
LYMPHOCYTES NFR BLD AUTO: 50 %
MCH RBC QN AUTO: 31.5 PG (ref 26.5–33)
MCHC RBC AUTO-ENTMCNC: 31.6 G/DL (ref 31.5–36.5)
MCV RBC AUTO: 100 FL (ref 78–100)
MONOCYTES # BLD AUTO: 0.8 10E3/UL (ref 0–1.3)
MONOCYTES NFR BLD AUTO: 8 %
NEUTROPHILS # BLD AUTO: 4.2 10E3/UL (ref 1.6–8.3)
NEUTROPHILS NFR BLD AUTO: 42 %
NRBC # BLD AUTO: 0 10E3/UL
NRBC BLD AUTO-RTO: 0 /100
PLATELET # BLD AUTO: 288 10E3/UL (ref 150–450)
RBC # BLD AUTO: 3.68 10E6/UL (ref 3.8–5.2)
WBC # BLD AUTO: 10 10E3/UL (ref 4–11)

## 2021-11-07 ASSESSMENT — ENCOUNTER SYMPTOMS
DIZZINESS: 1
LIGHT-HEADEDNESS: 1

## 2021-11-07 NOTE — ED PROVIDER NOTES
"I agree with medical decision making. See my separate supervision note for further details.    Juliana Donaldson MD      History   Chief Complaint:  Post-op Problem       HPI   Grace Jorgensen is a 71 year old female anticoagulated on coumadin with history of hyperlipidemia, CHF, anemia and atrial fibrillation who presents with post-op problem. The patient reports that she had a procedure today at 1700 where an epidermoid cyst was drained. She states that the provider did not close the wound, with goal for it to drain any remaining infection, however the lesion has been bleeding ever since. She states she is also having pain at the incision site and nausea. She denies fever or chills. She denies chest pain, shortness of breath, and vomiting as well. The patient notes she has chronic dizziness and lightheadedness since she had \"8 strokes in a weekend\" a couple years ago.     Review of Systems   Constitutional: Negative for chills and fever.   Skin: Positive for wound.   Neurological: Positive for dizziness (chronic) and light-headedness (chronic).   All other systems reviewed and are negative.      Allergies:  Bupropion    Medications:  Lasix   Norvasc  Celexa   Zestril   Carafate  Glucophage   Coumadin   Protonix  Keflex    Past Medical History:     Hyperlipidemia   CHF   GERD   Diabetes  Anemia   Atrial fibrillation  CVD  Depression   Gastroparesis    Past Surgical History:    Mastectomy   Left shoulder surgery x2   Hernia repair   Breast augmentation    Family History:    Father- CAD, hyperlipidemia   Mother- CAD, hyperlipidemia   Sister- hyperlipidemia, hypertension     Social History:  The patient presents alone.   Lives with her son who is \"a great cook\"    Physical Exam     Patient Vitals for the past 24 hrs:   BP Temp Temp src Pulse Resp SpO2 Weight   11/07/21 0030 -- -- -- -- -- 95 % --   11/07/21 0015 (!) 171/65 -- -- 79 -- 99 % --   11/07/21 0000 (!) 166/68 -- -- 75 -- 95 % --   11/06/21 2345 (!) 159/77 -- " -- 78 -- 96 % --   11/06/21 2251 (!) 155/84 97.6  F (36.4  C) Temporal 90 20 95 % 86.2 kg (190 lb)       Physical Exam  HENT:      Head: Normocephalic and atraumatic.   Eyes:      Extraocular Movements: Extraocular movements intact.   Cardiovascular:      Rate and Rhythm: Normal rate and regular rhythm.      Heart sounds: Normal heart sounds.   Pulmonary:      Effort: Pulmonary effort is normal.      Breath sounds: Normal breath sounds.   Chest:      Chest wall: Lacerations present.          Comments: 2 cm laceration, actively bleeding, below right axilla   Abdominal:      General: There is distension.      Palpations: Abdomen is soft.      Tenderness: There is no abdominal tenderness.   Musculoskeletal:      Right lower leg: No edema.      Left lower leg: No edema.   Skin:     General: Skin is warm.      Findings: Lesion present.   Neurological:      Mental Status: She is alert and oriented to person, place, and time.         Emergency Department Course     Imaging:  No orders to display       Laboratory:  Labs Ordered and Resulted from Time of ED Arrival to Time of ED Departure   INR - Abnormal       Result Value    INR 4.89 (*)    CBC WITH PLATELETS AND DIFFERENTIAL - Abnormal    WBC Count 10.0      RBC Count 3.68 (*)     Hemoglobin 11.6 (*)     Hematocrit 36.7            MCH 31.5      MCHC 31.6      RDW 13.4      Platelet Count 288      % Neutrophils 42      % Lymphocytes 50      % Monocytes 8      % Eosinophils 0      % Basophils 0      % Immature Granulocytes 0      NRBCs per 100 WBC 0      Absolute Neutrophils 4.2      Absolute Lymphocytes 4.9      Absolute Monocytes 0.8      Absolute Eosinophils 0.0      Absolute Basophils 0.0      Absolute Immature Granulocytes 0.0      Absolute NRBCs 0.0            Emergency Department Course:  Reviewed:  I reviewed nursing notes, vitals and past medical history    Assessments:  2300 I obtained history and examined the patient as noted above.   2345 I rechecked the  patient and packed wound with surgicel    0025 I rechecked the wound which showed good hemostasis    Consults:  None    Interventions:  Wound packed with Surgicel    Disposition:  The patient was discharged to home.     Impression & Plan     Medical Decision Making:  Grace Jorgensen is a 71 year old female anticoagulated on coumadin with history of hyperlipidemia, CHF, anemia and atrial fibrillation who presented with a bleeding to centimeter incision after drainage of an epidermoid cyst earlier today.  States that she has had persistent bleeding that has not been controlled with direct pressure and initially requesting stitches.  I explained that due to the nature of her initial problem there is actually preferable to keep the laceration openshe reports some lightheadedness and dizziness but this is chronic for her and no worse than normal.  CBC showed very mildly low hemoglobin, and INR significantly elevated at 4.89. Wound packed with Surgicel and showed good hemostasis, dressed with 4x4 gauze.  Patient was discharged home with recommendation to hold warfarin tonight and tomorrow, and call INR clinic first thing Monday morning to follow up INR.      Diagnosis:    ICD-10-CM    1. Supratherapeutic INR  R79.1    2. Bleeding  R58        Discharge Medications:  New Prescriptions    No medications on file     Rosita Jaramillo, PGY-2  [facility]      Scribe Disclosure:  I, Marylu Oleary, am serving as a scribe at 11:11 PM on 11/6/2021 to document services personally performed by Rosita Jaramillo MD based on my observations and the provider's statements to me.            Rosita Jaramillo MD  Resident  11/07/21 0046       Juliana Donaldson MD  11/09/21 [id number]

## 2021-11-07 NOTE — ED PROVIDER NOTES
Emergency Department Attending Supervision Note  11/6/2021  11:35 PM      I evaluated this patient in conjunction with Rosita Jaramillo MD.       Briefly, this 71 year old woman on Coumadin for history of atrial fibrillation presented with bleeding. She had incision and drainage of a right axillary infected sebaceous cyst and was discharged from Urgent Care on Keflex. Since that visit she has had persistent bleeding from the site which prompted her visit.  She has incision site pain and nausea, but denies fever or change to chronic dizziness.    On my exam,   General: WD/WN; well appearing elderly  woman; cooperative  Head:  Atraumatic  Eyes:  Conjunctivae, lids, and sclerae are normal  Neck:  Supple; normal ROM  Resp:  No respiratory distress  GI:  Nondistended    MS:  Normal ROM  Skin:  Warm; non-diaphoretic; no pallor; 1.5 cm linear incisional site in the right axilla with a small amount of continuous oozing inferiorly, no purulent discharge  Neuro: Awake; A&Ox3  Psych:  Normal mood and affect; normal speech  Vitals reviewed.    Results:  Labs Ordered and Resulted from Time of ED Arrival to Time of ED Departure   INR - Abnormal       Result Value    INR 4.89 (*)    CBC WITH PLATELETS AND DIFFERENTIAL - Abnormal    WBC Count 10.0      RBC Count 3.68 (*)     Hemoglobin 11.6 (*)     Hematocrit 36.7            MCH 31.5      MCHC 31.6      RDW 13.4      Platelet Count 288      % Neutrophils 42      % Lymphocytes 50      % Monocytes 8      % Eosinophils 0      % Basophils 0      % Immature Granulocytes 0      NRBCs per 100 WBC 0      Absolute Neutrophils 4.2      Absolute Lymphocytes 4.9      Absolute Monocytes 0.8      Absolute Eosinophils 0.0      Absolute Basophils 0.0      Absolute Immature Granulocytes 0.0      Absolute NRBCs 0.0         ED course:    2328 I obtained history and examined the patient as noted above. I injected lidocaine with epinephrine surrounding the incision.     0031 I checked  on patient and bleeding was controlled.      Impression/MDM:  Grace is a 71-year-old woman on Coumadin for history of atrial fibrillation who was seen at urgent care for incision and drainage of an infected sebaceous cyst.  Since she has been home she has had continuous oozing and has had to change her dressing several times which has been bothersome.  She has pain at the site without other concerns.  Fortunately, she appears very well on exam.  The incision does have a small amount of continuous oozing primarily inferiorly.  I injected 5 cc of lidocaine with epinephrine around the incision site with cessation of bleeding.  This wound was then packed by the resident physician with Surgicel to promote hemostasis but still keep the wound open as I would not want to suture it given report of purulent drainage.  She had complete cessation of bleeding by the time of discharge.  She has stable hemoglobin of 11.6 not requiring transfusion.  There is no leukocytosis.  However, her INR significantly for supratherapeutic at 4.89.  She has not taken a dose tonight and I recommended she hold this as well as her dose tomorrow with a plan to call her Coumadin clinic first thing on Monday morning to discuss her supratherapeutic INR and appropriate dosing adjustments.  I counseled her to remove any Surgicel that has not dissolved after a couple of days.  She does understand she can return with worsening symptoms or new concerns including, but not limited to, recurrence of bleeding.  Otherwise she will follow up with primary care/Coumadin clinic.  All questions answered.  Amenable to discharge.    Diagnosis    ICD-10-CM    1. Supratherapeutic INR  R79.1    2. Bleeding  R58          Juliana Donaldson MD Dixson, Kylie S, MD  11/09/21 [id number]

## 2021-11-07 NOTE — ED TRIAGE NOTES
Pt arrives to the ED today due to bleeding from incision site under right armpit. Pt states she had the procedure done around 1700 and that she had a cyst drained. States she is on warfarin. Pt has saturated through 4 drainage pads and states feeling dizzy/lightheaded. Denies SOB.

## 2021-11-07 NOTE — DISCHARGE INSTRUCTIONS
5/12/2021 Left tibia and fibula osteotomy    Pt reporting, she has a rash on the lower part of her ankle that is spreading.  The incision area is draining some white colored pus out of the area of the incision.  This has been going on for about a week and a half.     No fever, hot sweats, cold sweats or chills noted.    Pt wondering if there is an infection brewing?   Or if there is something else going on.    Please call the Pt back @ [phone] for further assistance.    Mariel Betancourt RN  Central Triage Red Flags/Med Refills      Reason for Disposition    Pus or bad-smelling fluid draining from incision    Additional Information    Negative: Major abdominal surgical incision and wound gaping open with visible internal organs    Negative: Sounds like a life-threatening emergency to the triager    Negative: Bleeding from incision and won't stop after 10 minutes of direct pressure    Negative: Widespread rash and bright red, sunburn-like    Negative: Severe pain in the incision    Negative: Incision gaping open and < 2 days (48 hours) since wound re-opened    Negative: Incision gaping open and length of opening > 2 inches (5 cm)    Negative: Patient sounds very sick or weak to the triager    Negative: Sounds like a serious complication to the triager    Negative: Fever > 100.4 F (38.0 C)    Negative: Incision looks infected (spreading redness, pain)    Negative: Red streak runs from the incision and longer than 1 inch (2.5 cm)    Protocols used: POST-OP INCISION SYMPTOMS AND [id number]-G-IN       Hold Coumadin tonight and tomorrow.  First thing Monday morning call your clinic to discuss the high INR and what changes you need to make to your Coumadin.  If there is some packing left after couple of days that has not dissolved you can pull it out.  Return with worsening symptoms or new concerns.

## 2022-02-19 ENCOUNTER — HOSPITAL ENCOUNTER (EMERGENCY)
Facility: CLINIC | Age: 72
Discharge: HOME | End: 2022-02-19
Attending: PHYSICIAN ASSISTANT | Admitting: PHYSICIAN ASSISTANT
Payer: COMMERCIAL

## 2022-02-19 ENCOUNTER — APPOINTMENT (OUTPATIENT)
Dept: ULTRASOUND IMAGING | Facility: CLINIC | Age: 72
End: 2022-02-19
Attending: NURSE PRACTITIONER
Payer: COMMERCIAL

## 2022-02-19 VITALS
HEART RATE: 76 BPM | OXYGEN SATURATION: 96 % | DIASTOLIC BLOOD PRESSURE: 75 MMHG | RESPIRATION RATE: 20 BRPM | SYSTOLIC BLOOD PRESSURE: 185 MMHG | TEMPERATURE: 97.9 F

## 2022-02-19 DIAGNOSIS — M62.838 MUSCLE SPASM: ICD-10-CM

## 2022-02-19 PROCEDURE — 99284 EMERGENCY DEPT VISIT MOD MDM: CPT | Mod: 25

## 2022-02-19 PROCEDURE — 93971 EXTREMITY STUDY: CPT | Mod: RT

## 2022-02-19 RX ORDER — METHOCARBAMOL 750 MG/1
750 TABLET, FILM COATED ORAL 3 TIMES DAILY PRN
Qty: 12 TABLET | Refills: 0 | Status: SHIPPED | OUTPATIENT
Start: 2022-02-19 | End: 2022-02-22

## 2022-02-19 ASSESSMENT — ENCOUNTER SYMPTOMS
APPETITE CHANGE: 0
ACTIVITY CHANGE: 1
MYALGIAS: 1
ARTHRALGIAS: 1

## 2022-02-19 NOTE — ED TRIAGE NOTES
Right calf pain that is radiating up to her right thigh. Concerned for DVT. Sudden onset the pain was better when getting dressed to arrive here

## 2022-02-20 NOTE — ED PROVIDER NOTES
"  History     Chief Complaint:  Leg Pain    The history is provided by the patient.      Grace Jorgensen is a 72 year old female that is anticoagulated on coumadin with a history of hypertension, stroke, and neuropathy who presents to the emergency department for evaluation of leg pain.  The patient reports 2 days ago she was sitting in her recliner when she had a sudden onset of acute bilateral leg pain. She noted the pain radiated to her calves and her shins at that time. Since that time, she reports pain has waxed and waned in severity. She reported ambulation has become increasingly more difficult. The pain since that time has begun to radiate primarily to her right thigh, calve, and shin. The patient endorsed similar episodes of muscle cramping, but usually they begin in her buttocks moving down her leg. Robyn reported she used a heating pad at home to treat her symptoms but this increased her pain level. Her increased leg pain prompted to present to the emergency department. Here, she reported the feeling of a \"bharati horse\" all through her lower body. She noted tightness and soreness in her right thigh. She endorsed that she was eating normally, but has not eaten anything today. She denied any trauma or falls.     Review of Systems   Constitutional: Positive for activity change (decreased movement, ambulation difficulty ). Negative for appetite change.   Musculoskeletal: Positive for arthralgias, gait problem and myalgias.   All other systems reviewed and are negative.    Allergies:  Bupropion    Medications:    HYDROcodone-acetaminophen (NORCO) 5-325 MG per tablet  furosemide (LASIX) 20 MG tablet     Melatonin 1 MG       sucralfate (CARAFATE) 1 g tablet     metFORMIN (GLUCOPHAGE) 500 MG tablet    warfarin (COUMADIN) 5 MG tablet     MI-ACID GAS RELIEF 80 MG chewable tablet     ondansetron (ZOFRAN-ODT) 4 MG disintegrating tablet     citalopram (CELEXA) 40 MG tablet     pantoprazole DR (PROTONIX) 20 MG " tablet     amLODIPine (NORVASC) 5 MG tablet     atorvastatin (LIPITOR) 20 MG tablet     lisinopril (ZESTRIL) 40 MG tablet       Past Medical History:    Dyslipidemia   Chronic heart failure with preserved ejection fraction   Hallux valgus (acquired), right foot   Acquired hammer toes of both feet  Hematemesis   Abdominal pain, epigastric   Gastroesophageal reflux disease   Gastroparesis   Vitamin D deficiency   Diet-controlled diabetes mellitus   Anemia, unspecified   Paroxysmal atrial fibrillation   Hiatal hernia   History of humerus fracture   Other late effects of cerebrovascular disease  Dizziness and giddiness   Iron deficiency anemia   Cerebellar stroke, acute  Pain in joint, shoulder region   Cerebral infarction   Restless leg syndrome   Hypertension    Depression   Esophageal reflux    Past Surgical History:    Mastectomy simple comp bilat   Obstetrical care  Pyelotomy; with removal of calculus   Incisional hernia repair   Breast augmentation   Mastectomy     Family History:    Coronary artery disease (father, mother)   Hyperlipidemia (father, mother)     Physical Exam     Patient Vitals for the past 24 hrs:   BP Temp Temp src Pulse Resp SpO2   02/19/22 1743 -- -- -- -- -- 96 %   02/19/22 1742 (!) 185/75 -- -- 76 -- --   02/19/22 1731 (!) 152/115 97.9  F (36.6  C) Temporal 92 20 96 %     Physical Exam  Constitutional: alert, cooperative   CV: 2+ DP and PT pulses, brisk distal cap refill  Pulm: No respiratory distress  MSK: full ROM thoughout lower extremities. No bony tenderness. No midline cervical, thoracic, lumbar tenderness. No skin changes. Compartments soft.  Neurological: Alert, attentive.  5/5 strength throughout the bilateral lower extremities (hip flexion/extension, knee flexion/extension, DF/PF,); sensation intact to light touch throughout L2-S1 distributions to the lower extremities; normal gait  Skin: Skin is warm and dry.   Psych: Normal mood and affect     Emergency Department Course      Imaging:  US Lower Extremity Venous Duplex Right   Final Result   IMPRESSION:   1.  No deep venous thrombosis in the right lower extremity.        Emergency Department Course:    Reviewed:  I reviewed nursing notes, vitals, past history and care everywhere    Assessments:  1915 I obtained history and examined the patient as noted above.   1937 I rechecked the patient and explained findings.     Disposition:  The patient was discharged to home.    Impression & Plan    Medical Decision Making:  Grace Jorgensen is a 72 year old female with a medical history including paroxysmal atrial fibrillation presents emergency department with right leg pain. Clinically, consistent with muscle spasm. Lower extremity ultrasound without evidence of DVT or other acute findings. There is no recent trauma to suggest fracture. No back pain to suggest radiculopathy. I discussed using Tylenol as needed for pain. She requested muscle relaxant as she also suspects muscle spasm. I discussed my hesitancy to prescribe muscle relaxant as she does have a history of falls. She reports she is very sedentary at home and after lengthy discussion versus risk versus benefit of muscle relaxants, we did agree on a short prescription. I recommended using these carefully and following up closely with primary care provider. Return precautions discussed including increasing pain , focal weakness or numbness, or any other concerning symptoms develop. Patient agrees with this plan all questions and concerns addressed prior to discharge home.     Diagnosis:    ICD-10-CM    1. Muscle spasm  M62.838      Discharge Medications:  Discharge Medication List as of 2/19/2022  7:59 PM      START taking these medications    Details   methocarbamol (ROBAXIN) 750 MG tablet Take 1 tablet (750 mg) by mouth 3 times daily as needed for muscle spasms, Disp-12 tablet, R-0, Local Print           Scribe Disclosure:  Serena TAM, am serving as a scribe at 7:15 PM on  2/19/2022 to document services personally performed by Renata Velez PAC based on my observations and the provider's statements to me.      Renata Velez, MARCELL  02/19/22 8611

## 2022-09-04 ENCOUNTER — APPOINTMENT (OUTPATIENT)
Dept: ULTRASOUND IMAGING | Facility: CLINIC | Age: 72
End: 2022-09-04
Attending: EMERGENCY MEDICINE
Payer: COMMERCIAL

## 2022-09-04 ENCOUNTER — HOSPITAL ENCOUNTER (EMERGENCY)
Facility: CLINIC | Age: 72
Discharge: HOME | End: 2022-09-04
Attending: EMERGENCY MEDICINE | Admitting: EMERGENCY MEDICINE
Payer: COMMERCIAL

## 2022-09-04 DIAGNOSIS — M79.605 PAIN OF LEFT LOWER EXTREMITY: ICD-10-CM

## 2022-09-04 DIAGNOSIS — R79.1 SUBTHERAPEUTIC INTERNATIONAL NORMALIZED RATIO (INR): ICD-10-CM

## 2022-09-04 LAB — INR PPP: 1.03 (ref 0.85–1.15)

## 2022-09-04 PROCEDURE — 93971 EXTREMITY STUDY: CPT | Mod: LT

## 2022-09-04 PROCEDURE — 85610 PROTHROMBIN TIME: CPT | Performed by: EMERGENCY MEDICINE

## 2022-09-04 PROCEDURE — 36415 COLL VENOUS BLD VENIPUNCTURE: CPT | Performed by: EMERGENCY MEDICINE

## 2022-09-04 PROCEDURE — 99284 EMERGENCY DEPT VISIT MOD MDM: CPT | Mod: 25

## 2022-09-04 ASSESSMENT — ENCOUNTER SYMPTOMS: COLOR CHANGE: 1

## 2022-09-04 ASSESSMENT — ACTIVITIES OF DAILY LIVING (ADL): ADLS_ACUITY_SCORE: 33

## 2022-09-04 NOTE — ED TRIAGE NOTES
Pt with hx of several strokes and is on coumadin.  Pt did not take her pills for several days ad is now complains of a painful bump on the front of her right shin.     Triage Assessment     Row Name 09/04/22 1058       Triage Assessment (Adult)    Airway WDL WDL       Respiratory WDL    Respiratory WDL WDL

## 2022-09-04 NOTE — ED PROVIDER NOTES
History   Chief Complaint:  No chief complaint on file.       The history is provided by the patient.      Grace Jorgensen is a 72 year old female anticoagulated on Warfarin with history of strokes, paroxysmal A-fib, and hypertension who presents with concern for a blood clot in her left leg. She states her left left leg was swollen last night and there was a red bump about 1 inch wide that was sore. The bump was located on her left shin but as she presents to the ED, she is unable to locate it. She did not take her Warfarin for 3 days but refilled her prescription and took it today. As she presents to the ED, her leg is no longer swollen.    Review of Systems   Cardiovascular: Negative for leg swelling.   Skin: Positive for color change (slight redness on lower left shin).   All other systems reviewed and are negative.    Allergies:  Bupropion    Medications:  Hydrocodone-acetaminophen  Warfarin  Lisinopril  Metformin  Citalopram  Gabapentin    Past Medical History:     Paroxysmal A-fib  Embolic stroke  Cerebellar ataxia  Tobacco use  Hyponatremia  Hyperlipidemia  Hypertension  Hematemesis  Anemia  Anxiety  GERD  Head fracture  Depression  Shingles  Vitamin D deficiency  Hiatal hernia  RLS    Past Surgical History:    Mastectomy x2  Obstetrical care  Pyelotomy  Left shoulder surgery x2  Incisional hernia repair  Breast augmentation  Dilation and curettage  Musculoskeletal procedure  EGD  Cataract removal x2    Family History:    Father: CAD, hyperlipidemia  Mother: CAD, hyperlipidemia  Son: asthma    Social History:  The patient presents to the ED alone.  PCP: [facility]     Physical Exam     No data found.    Physical Exam  General- alert, cooperative  Pulm- normal respiratory effort, no respiratory distress  Msk- RUE: 2+ pulses, sensation to light touch intact, no wounds or abrasions   ROM normal without difficulty, 5/5 strength           LUE: 2+ pulses, sensation to light touch  intact, no wounds or abrasions   ROM normal without difficulty, 5/5 strength           RLE: 2+ pulses, sensation to light touch intact, no wounds or abrasions   ROM normal without difficulty, 5/5 strength           LLE: 2+ pulses, sensation intact to light touch, no wounds or abrasions   ROM normal without difficulty, 5/5 strength. Small area on top of ankle anteriorly that is tender, slight bruising in this area, calf is nontender  Skin- no cyanosis or edema, no swelling  Psych- normal mood and affect, normal behavior                 Emergency Department Course     Imaging:  US Lower Extremity Venous Duplex Left   Final Result   IMPRESSION:   1.  No deep venous thrombosis in the left lower extremity.        Report per radiology    Laboratory:  Labs Ordered and Resulted from Time of ED Arrival to Time of ED Departure   INR - Normal       Result Value    INR 1.03        Emergency Department Course:     Reviewed:  I reviewed nursing notes, vitals, past medical history and Care Everywhere    Assessments:  1850 I obtained history and examined the patient as noted above.   2018 I rechecked the patient and explained findings.     Disposition:  The patient was discharged to home.     Impression & Plan     Medical Decision Making:  Patient presents today for evaluation of possible DVT due to the fact that she is missing her Coumadin for 3 days.  She did start back today.  Her INR is 1.03.  I asked her to take an extra dose today and tomorrow to try to get her caught up.  She is concerned about the lesion on her lower shin.  There might have been a slight bruising in that area.  There is no concern for DVT based on ultrasound today.  I did reassure her.  I asked her to monitor this area as it does not appear to be anything concerning.  She is reassured.  She is asked to follow-up with her doctor on Tuesday to recheck her INR.  Diagnosis:    ICD-10-CM    1. Pain of left lower extremity  M79.605    2. Subtherapeutic  international normalized ratio (INR)  R79.1      Scribe Disclosure:  I, Fransico Greer, am serving as a scribe at 6:50 PM on 9/4/2022 to document services personally performed by Nathan Soria MD based on my observations and the provider's statements to me.        Nathan Soria MD  09/04/22 2042

## 2022-09-05 NOTE — DISCHARGE INSTRUCTIONS
Take an extra dose of your coumadin today and tomorrow  Recheck INR on Tuesday to see if it's at 2.0  Return if any problems

## 2022-11-09 ENCOUNTER — HOSPITAL ENCOUNTER (EMERGENCY)
Facility: CLINIC | Age: 72
Discharge: HOME | End: 2022-11-09
Attending: EMERGENCY MEDICINE | Admitting: EMERGENCY MEDICINE
Payer: COMMERCIAL

## 2022-11-09 ENCOUNTER — APPOINTMENT (OUTPATIENT)
Dept: CT IMAGING | Facility: CLINIC | Age: 72
End: 2022-11-09
Attending: EMERGENCY MEDICINE
Payer: COMMERCIAL

## 2022-11-09 VITALS
HEIGHT: 64 IN | HEART RATE: 81 BPM | BODY MASS INDEX: 29.88 KG/M2 | OXYGEN SATURATION: 95 % | DIASTOLIC BLOOD PRESSURE: 75 MMHG | WEIGHT: 175 LBS | SYSTOLIC BLOOD PRESSURE: 157 MMHG | RESPIRATION RATE: 20 BRPM | TEMPERATURE: 98.7 F

## 2022-11-09 DIAGNOSIS — R79.1 SUBTHERAPEUTIC INTERNATIONAL NORMALIZED RATIO (INR): ICD-10-CM

## 2022-11-09 DIAGNOSIS — R55 NEAR SYNCOPE: ICD-10-CM

## 2022-11-09 LAB
ANION GAP SERPL CALCULATED.3IONS-SCNC: 9 MMOL/L (ref 3–14)
APTT PPP: 20 SECONDS (ref 22–38)
ATRIAL RATE - MUSE: 78 BPM
BASOPHILS # BLD AUTO: 0 10E3/UL (ref 0–0.2)
BASOPHILS NFR BLD AUTO: 0 %
BUN SERPL-MCNC: 15 MG/DL (ref 7–30)
CALCIUM SERPL-MCNC: 9.1 MG/DL (ref 8.5–10.1)
CHLORIDE BLD-SCNC: 102 MMOL/L (ref 94–109)
CO2 SERPL-SCNC: 21 MMOL/L (ref 20–32)
CREAT SERPL-MCNC: 1.14 MG/DL (ref 0.52–1.04)
DIASTOLIC BLOOD PRESSURE - MUSE: NORMAL MMHG
EOSINOPHIL # BLD AUTO: 0 10E3/UL (ref 0–0.7)
EOSINOPHIL NFR BLD AUTO: 0 %
ERYTHROCYTE [DISTWIDTH] IN BLOOD BY AUTOMATED COUNT: 17.2 % (ref 10–15)
GFR SERPL CREATININE-BSD FRML MDRD: 51 ML/MIN/1.73M2
GLUCOSE BLD-MCNC: 134 MG/DL (ref 70–99)
HCT VFR BLD AUTO: 33 % (ref 35–47)
HGB BLD-MCNC: 10.5 G/DL (ref 11.7–15.7)
HOLD SPECIMEN: NORMAL
HOLD SPECIMEN: NORMAL
IMM GRANULOCYTES # BLD: 0 10E3/UL
IMM GRANULOCYTES NFR BLD: 0 %
INR PPP: 1.02 (ref 0.85–1.15)
INTERPRETATION ECG - MUSE: NORMAL
LYMPHOCYTES # BLD AUTO: 1.5 10E3/UL (ref 0.8–5.3)
LYMPHOCYTES NFR BLD AUTO: 17 %
MCH RBC QN AUTO: 26.4 PG (ref 26.5–33)
MCHC RBC AUTO-ENTMCNC: 31.8 G/DL (ref 31.5–36.5)
MCV RBC AUTO: 83 FL (ref 78–100)
MONOCYTES # BLD AUTO: 0.6 10E3/UL (ref 0–1.3)
MONOCYTES NFR BLD AUTO: 7 %
NEUTROPHILS # BLD AUTO: 6.7 10E3/UL (ref 1.6–8.3)
NEUTROPHILS NFR BLD AUTO: 76 %
NRBC # BLD AUTO: 0 10E3/UL
NRBC BLD AUTO-RTO: 0 /100
P AXIS - MUSE: 56 DEGREES
PLATELET # BLD AUTO: 378 10E3/UL (ref 150–450)
POTASSIUM BLD-SCNC: 4.1 MMOL/L (ref 3.4–5.3)
PR INTERVAL - MUSE: 158 MS
QRS DURATION - MUSE: 76 MS
QT - MUSE: 384 MS
QTC - MUSE: 437 MS
R AXIS - MUSE: 15 DEGREES
RBC # BLD AUTO: 3.97 10E6/UL (ref 3.8–5.2)
SODIUM SERPL-SCNC: 132 MMOL/L (ref 133–144)
SYSTOLIC BLOOD PRESSURE - MUSE: NORMAL MMHG
T AXIS - MUSE: 47 DEGREES
TROPONIN I SERPL HS-MCNC: 5 NG/L
VENTRICULAR RATE- MUSE: 78 BPM
WBC # BLD AUTO: 8.8 10E3/UL (ref 4–11)

## 2022-11-09 PROCEDURE — 85610 PROTHROMBIN TIME: CPT | Performed by: EMERGENCY MEDICINE

## 2022-11-09 PROCEDURE — 85004 AUTOMATED DIFF WBC COUNT: CPT | Performed by: EMERGENCY MEDICINE

## 2022-11-09 PROCEDURE — 93005 ELECTROCARDIOGRAM TRACING: CPT

## 2022-11-09 PROCEDURE — 250N000011 HC RX IP 250 OP 636

## 2022-11-09 PROCEDURE — 99285 EMERGENCY DEPT VISIT HI MDM: CPT | Mod: 25

## 2022-11-09 PROCEDURE — 250N000011 HC RX IP 250 OP 636: Performed by: EMERGENCY MEDICINE

## 2022-11-09 PROCEDURE — 80048 BASIC METABOLIC PNL TOTAL CA: CPT | Performed by: EMERGENCY MEDICINE

## 2022-11-09 PROCEDURE — 99291 CRITICAL CARE FIRST HOUR: CPT | Performed by: PSYCHIATRY & NEUROLOGY

## 2022-11-09 PROCEDURE — 70450 CT HEAD/BRAIN W/O DYE: CPT

## 2022-11-09 PROCEDURE — 250N000009 HC RX 250: Performed by: EMERGENCY MEDICINE

## 2022-11-09 PROCEDURE — 96372 THER/PROPH/DIAG INJ SC/IM: CPT | Performed by: EMERGENCY MEDICINE

## 2022-11-09 PROCEDURE — 96374 THER/PROPH/DIAG INJ IV PUSH: CPT | Mod: 59

## 2022-11-09 PROCEDURE — 84484 ASSAY OF TROPONIN QUANT: CPT | Performed by: EMERGENCY MEDICINE

## 2022-11-09 PROCEDURE — 85730 THROMBOPLASTIN TIME PARTIAL: CPT | Performed by: EMERGENCY MEDICINE

## 2022-11-09 PROCEDURE — 36415 COLL VENOUS BLD VENIPUNCTURE: CPT | Performed by: EMERGENCY MEDICINE

## 2022-11-09 RX ORDER — ENOXAPARIN SODIUM 100 MG/ML
60 INJECTION SUBCUTANEOUS ONCE
Status: COMPLETED | OUTPATIENT
Start: 2022-11-09 | End: 2022-11-09

## 2022-11-09 RX ORDER — PANTOPRAZOLE SODIUM 20 MG/1
40 TABLET, DELAYED RELEASE ORAL EVERY EVENING
COMMUNITY

## 2022-11-09 RX ORDER — SODIUM CHLORIDE 9 MG/ML
INJECTION, SOLUTION INTRAVENOUS CONTINUOUS
Status: DISCONTINUED | OUTPATIENT
Start: 2022-11-09 | End: 2022-11-09 | Stop reason: HOSPADM

## 2022-11-09 RX ORDER — LISINOPRIL 40 MG/1
40 TABLET ORAL DAILY
Status: ON HOLD | COMMUNITY
End: 2024-01-25

## 2022-11-09 RX ORDER — WARFARIN SODIUM 5 MG/1
10 TABLET ORAL DAILY
Status: ON HOLD | COMMUNITY
End: 2024-01-25

## 2022-11-09 RX ORDER — AMLODIPINE BESYLATE 5 MG/1
5-10 TABLET ORAL DAILY
Status: ON HOLD | COMMUNITY
End: 2023-10-25

## 2022-11-09 RX ORDER — IOPAMIDOL 755 MG/ML
75 INJECTION, SOLUTION INTRAVASCULAR ONCE
Status: COMPLETED | OUTPATIENT
Start: 2022-11-09 | End: 2022-11-09

## 2022-11-09 RX ORDER — ONDANSETRON 2 MG/ML
4 INJECTION INTRAMUSCULAR; INTRAVENOUS ONCE
Status: COMPLETED | OUTPATIENT
Start: 2022-11-09 | End: 2022-11-09

## 2022-11-09 RX ORDER — ATORVASTATIN CALCIUM 20 MG/1
20 TABLET, FILM COATED ORAL AT BEDTIME
COMMUNITY

## 2022-11-09 RX ORDER — ENOXAPARIN SODIUM 100 MG/ML
60 INJECTION SUBCUTANEOUS EVERY 12 HOURS
Qty: 6 ML | Refills: 0 | Status: SHIPPED | OUTPATIENT
Start: 2022-11-09 | End: 2022-11-14

## 2022-11-09 RX ORDER — CITALOPRAM HYDROBROMIDE 40 MG/1
40 TABLET ORAL DAILY
COMMUNITY

## 2022-11-09 RX ORDER — ONDANSETRON 2 MG/ML
INJECTION INTRAMUSCULAR; INTRAVENOUS
Status: COMPLETED
Start: 2022-11-09 | End: 2022-11-09

## 2022-11-09 RX ORDER — KETOCONAZOLE 20 MG/ML
SHAMPOO TOPICAL
COMMUNITY

## 2022-11-09 RX ADMIN — IOPAMIDOL 75 ML: 755 INJECTION, SOLUTION INTRAVENOUS at 18:11

## 2022-11-09 RX ADMIN — ENOXAPARIN SODIUM 60 MG: 60 INJECTION SUBCUTANEOUS at 19:25

## 2022-11-09 RX ADMIN — SODIUM CHLORIDE 100 ML: 900 INJECTION INTRAVENOUS at 18:11

## 2022-11-09 RX ADMIN — ONDANSETRON 4 MG: 2 INJECTION INTRAMUSCULAR; INTRAVENOUS at 16:56

## 2022-11-09 ASSESSMENT — ENCOUNTER SYMPTOMS
SHORTNESS OF BREATH: 0
DYSURIA: 0
WEAKNESS: 0
CHILLS: 0
DIZZINESS: 1
FEVER: 0
LIGHT-HEADEDNESS: 1
NUMBNESS: 0

## 2022-11-09 ASSESSMENT — ACTIVITIES OF DAILY LIVING (ADL)
ADLS_ACUITY_SCORE: 35
ADLS_ACUITY_SCORE: 35

## 2022-11-09 NOTE — ED TRIAGE NOTES
Patient brought in by EMS from home. Patient reports dizziness 30 minutes prior to calling EMS. Patient vomited one time. Patient received one tablet of ODT zofran. Patient stopped taking her Warfarin 3 days ago due to having a skin tear on her hand that continued to ooze blood. INR checked yesterday and was 1. Patient has a history of stroke and vertigo.

## 2022-11-09 NOTE — PHARMACY-ADMISSION MEDICATION HISTORY
Pharmacy Medication History  Admission medication history interview status for the 11/9/2022  admission is complete. See EPIC admission navigator for prior to admission medications     Location of Interview: Patient room  Medication history sources: Patient and Surescripts    Significant changes made to the medication list:  1. Removed Norco and added rest of medications  2. Warfarin directions were previously 5 mg twice daily. Updated per patient.    In the past week, patient estimated taking medication this percent of the time: 50-90% due to illness    Additional medication history information:   1. Patient states last dose of warfarin was Saturday, 11/5 and took 10 mg. Stopped taking due to bleeding on hand that wouldn't stop.    Medication reconciliation completed by provider prior to medication history? No    Time spent in this activity: 10 min    Prior to Admission medications    Medication Sig Last Dose Taking? Auth Provider Long Term End Date   amLODIPine (NORVASC) 5 MG tablet Take 10 mg by mouth daily 11/8/2022 Yes Unknown, Entered By History Yes    atorvastatin (LIPITOR) 20 MG tablet Take 20 mg by mouth daily 11/8/2022 Yes Unknown, Entered By History Yes    citalopram (CELEXA) 40 MG tablet Take 40 mg by mouth daily 11/8/2022 Yes Unknown, Entered By History Yes    lisinopril (ZESTRIL) 40 MG tablet Take 40 mg by mouth daily 11/8/2022 Yes Unknown, Entered By History Yes    metFORMIN (GLUCOPHAGE) 500 MG tablet Take 500 mg by mouth daily (with breakfast) 11/8/2022 Yes Unknown, Entered By History Yes    pantoprazole (PROTONIX) 20 MG EC tablet Take 40 mg by mouth 2 times daily 11/8/2022 Yes Unknown, Entered By History     warfarin ANTICOAGULANT (COUMADIN) 5 MG tablet Take 10 mg by mouth daily Sunday, Tuesday, Thursday, Saturday 11/5/2022 Yes Unknown, Entered By History     warfarin ANTICOAGULANT (COUMADIN) 5 MG tablet Take 5 mg by mouth Every Monday, Wednesday, and Friday 11/4/2022 Yes Reported, Patient      ketoconazole (NIZORAL) 2 % external shampoo Apply topically daily as needed for itching or irritation has not started  Unknown, Entered By History       The information provided in this note is only as accurate as the sources available at the time of update(s)   Kannan RamírezD

## 2022-11-10 NOTE — DISCHARGE INSTRUCTIONS
Please resume taking warfarin as we discussed.  You will need to get her INR checked by Monday at the latest.    Return immediately to the ER for recurrent dizziness, recurrent vomiting, or any new concerns.    Use Lovenox twice daily beginning tomorrow as we discussed.

## 2022-11-10 NOTE — ED NOTES
"[facility]  ED Nurse Handoff Report    ED Chief complaint: Dizziness      ED Diagnosis:   Final diagnoses:   None       Code Status: Full Code    Allergies:   Allergies   Allergen Reactions    Bupropion GI Disturbance       Patient Story: Patient brought in by EMS from home. Patient reports dizziness 30 minutes prior to calling EMS. Patient vomited one time. Patient received one tablet of ODT zofran. Patient stopped taking her Warfarin 3 days ago due to having a skin tear on her hand that continued to ooze blood. INR checked yesterday and was 1. Patient has a history of stroke and vertigo  Focused Assessment:  dizziness    Treatments and/or interventions provided: ct, cta, lab , IV   Patient's response to treatments and/or interventions: tolerated     To be done/followed up on inpatient unit:      Does this patient have any cognitive concerns?:  none    Activity level - Baseline/Home:  Unknown  Activity Level - Current:   Unknown    Patient's Preferred language: English   Needed?: No    Isolation: None  Infection: Not Applicable  Patient tested for COVID 19 prior to admission: YES  Bariatric?: No    Vital Signs:   Vitals:    11/09/22 1555 11/09/22 1630   BP: 113/69    Pulse: 78 78   Resp: 13 19   Temp: 98.7  F (37.1  C)    SpO2: 92%    Weight: 79.4 kg (175 lb)    Height: 1.626 m (5' 4\")        Cardiac Rhythm:     Was the PSS-3 completed:   Yes  What interventions are required if any?               Family Comments: n/a  OBS brochure/video discussed/provided to patient/family: N/A              Name of person given brochure if not patient:               Relationship to patient:     For the majority of the shift this patient's behavior was Green.   Behavioral interventions performed were .    ED NURSE PHONE NUMBER: [phone]         "

## 2022-11-10 NOTE — ED PROVIDER NOTES
History     Chief Complaint:  Dizziness       HPI   Grace Jorgensen is a 72 year old female who presents with dizziness.  The patient says she had an episode of lightheadedness approximately 45 minutes prior to arrival.  She was at a birthday party.  She vomited and felt that she needed to sit down.  This was not a full syncopal episode.  The patient does have a history of prior stroke including cerebellar CVA.  She says she is chronically dizzy with ataxic gait related to her stroke but feels that this is unchanged.  She denies any headache or chest pain.  No recent fever or coughing.  She does not have any focal neurologic deficits such as weakness or numbness that lateralizes.  Patient feels back to baseline on arrival.    Patient has a history of atrial fibrillation and is on warfarin.  She stopped her warfarin 3 days ago as she had some bleeding from skin tear over the dorsum of the left hand.  It has now resolved.    ROS:  Review of Systems   Constitutional: Negative for chills and fever.   HENT: Negative for congestion.    Respiratory: Negative for shortness of breath.    Genitourinary: Negative for dysuria.   Neurological: Positive for dizziness and light-headedness. Negative for weakness and numbness.   All other systems reviewed and are negative.         Allergies:  Bupropion     Medications:    amLODIPine (NORVASC) 5 MG tablet  atorvastatin (LIPITOR) 20 MG tablet  citalopram (CELEXA) 40 MG tablet  enoxaparin ANTICOAGULANT (LOVENOX ANTICOAGULANT) 60 MG/0.6ML syringe  lisinopril (ZESTRIL) 40 MG tablet  metFORMIN (GLUCOPHAGE) 500 MG tablet  pantoprazole (PROTONIX) 20 MG EC tablet  warfarin ANTICOAGULANT (COUMADIN) 5 MG tablet  warfarin ANTICOAGULANT (COUMADIN) 5 MG tablet  ketoconazole (NIZORAL) 2 % external shampoo        Past Medical History:    No past medical history on file.    Past Surgical History:    No past surgical history on file.     Family History:    family history is not on  "file.    Social History:     PCP: [facility], [facility]     Physical Exam     Patient Vitals for the past 24 hrs:   BP Temp Pulse Resp SpO2 Height Weight   11/09/22 1841 -- -- -- -- 94 % -- --   11/09/22 1835 -- -- 74 18 90 % -- --   11/09/22 1630 -- -- 78 19 -- -- --   11/09/22 1555 113/69 98.7  F (37.1  C) 78 13 92 % 1.626 m (5' 4\") 79.4 kg (175 lb)        Physical Exam  Constitutional:       General: She is not in acute distress.     Appearance: She is not diaphoretic.   HENT:      Head: Atraumatic.      Right Ear: Tympanic membrane, ear canal and external ear normal.      Left Ear: Tympanic membrane, ear canal and external ear normal.      Mouth/Throat:      Mouth: Mucous membranes are moist.      Pharynx: Oropharynx is clear. No oropharyngeal exudate.   Eyes:      General: No scleral icterus.     Pupils: Pupils are equal, round, and reactive to light.   Cardiovascular:      Rate and Rhythm: Normal rate and regular rhythm.      Heart sounds: Normal heart sounds.   Pulmonary:      Effort: No respiratory distress.      Breath sounds: Normal breath sounds.   Abdominal:      General: Bowel sounds are normal.      Palpations: Abdomen is soft.      Tenderness: There is no abdominal tenderness.   Musculoskeletal:         General: No tenderness.   Skin:     General: Skin is warm.      Capillary Refill: Capillary refill takes less than 2 seconds.      Findings: No rash.   Neurological:      General: No focal deficit present.      Mental Status: She is oriented to person, place, and time.   Psychiatric:         Mood and Affect: Mood normal.         Behavior: Behavior normal.           Emergency Department Course   ECG:  Rate 78.  Normal sinus rhythm.  Intervals within normal limits.  No acute ST or T wave changes.  No changes compared to 2018.    Imaging:  CT Head w/o Contrast   Final Result   IMPRESSION:      1. Chronic right greater than left cerebellar hemisphere infarct without acute intracranial " abnormality.      - - - - - - - - - - - - - - - - - - - - - - - - - - - - - - - - - - - - - - - - - - - - - - - - - - - - - - - - - - - - - - - - - - - - - - - - - - - -    The noncontrast head CT results above were discussed with Dr. Moya on 11/9/2022 4:48 PM Dr. Pato Woodall.   - - - - - - - - - - - - - - - - - - - - - - - - - - - - - - - - - - - - - - - - - - - - - - - - - - - - - - - - - - - - - - - - - - - - - - - - - - - -      CTA Head Neck w Contrast    (Results Pending)   MR Brain w/o & w Contrast    (Results Pending)      Report per radiology    Laboratory:  Labs Ordered and Resulted from Time of ED Arrival to Time of ED Departure   BASIC METABOLIC PANEL - Abnormal       Result Value    Sodium 132 (*)     Potassium 4.1      Chloride 102      Carbon Dioxide (CO2) 21      Anion Gap 9      Urea Nitrogen 15      Creatinine 1.14 (*)     Calcium 9.1      Glucose 134 (*)     GFR Estimate 51 (*)    PARTIAL THROMBOPLASTIN TIME - Abnormal    aPTT 20 (*)    CBC WITH PLATELETS AND DIFFERENTIAL - Abnormal    WBC Count 8.8      RBC Count 3.97      Hemoglobin 10.5 (*)     Hematocrit 33.0 (*)     MCV 83      MCH 26.4 (*)     MCHC 31.8      RDW 17.2 (*)     Platelet Count 378      % Neutrophils 76      % Lymphocytes 17      % Monocytes 7      % Eosinophils 0      % Basophils 0      % Immature Granulocytes 0      NRBCs per 100 WBC 0      Absolute Neutrophils 6.7      Absolute Lymphocytes 1.5      Absolute Monocytes 0.6      Absolute Eosinophils 0.0      Absolute Basophils 0.0      Absolute Immature Granulocytes 0.0      Absolute NRBCs 0.0     INR - Normal    INR 1.02     TROPONIN I - Normal    Troponin I High Sensitivity 5     GLUCOSE MONITOR NURSING POCT        Interventions:  Medications   sodium chloride 0.9% infusion (has no administration in time range)   enoxaparin ANTICOAGULANT (LOVENOX) injection 60 mg (has no administration in time range)   iopamidol (ISOVUE-370) solution 75 mL (75 mLs Intravenous Given  11/9/22 1811)   100mL Saline FLush (100 mLs As instructed Given 11/9/22 1811)   ondansetron (ZOFRAN) injection 4 mg (4 mg Intravenous Given 11/9/22 1656)        Disposition:  The patient was discharged to home.     Impression & Plan      Medical Decision Making:  This patient is a 72-year-old woman who presents to the ED with an episode of lightheadedness.  She did not have a full syncopal episode.  Her concern initially was for CVA given that her INR is subtherapeutic.  She is hemodynamically stable.  She is not in atrial fibrillation today.  Noncontrasted CT is negative.  There is grade difficult time establishing an IV to do the angiogram portion.  The patient is unable to get an MRI.  Multiple times were made to do a CT angiogram and her IV lines blew multiple times.    Patient seen by neurology here who feels that she is very unlikely to be having a stroke.  The patient herself feels that this is different from prior strokes and that she was more lightheaded.  Her family feels this was well.  I discussed with the patient our concern that she may be having a CVA and importance of further imaging.  At this point the patient is very adamantly requesting to forego further imaging and was to be discharged home.  She is asymptomatic.  This is a TIA and that she needs to become therapeutic on her anticoagulation.  She has had prior work-up done.    In speaking with pharmacy we will bridge patient with Lovenox while she resumes her warfarin.  She is to get her INR checked in 5 days which she agrees to do.  She agrees to return immediately if worse.  Her son will be with her continuously over the next several days.      Diagnosis:    ICD-10-CM    1. Near syncope  R55       2. Subtherapeutic international normalized ratio (INR)  R79.1            Discharge Medications:  New Prescriptions    ENOXAPARIN ANTICOAGULANT (LOVENOX ANTICOAGULANT) 60 MG/0.6ML SYRINGE    Inject 0.6 mLs (60 mg) Subcutaneous every 12 hours for 5 days         11/9/2022   Marcelo Moya Sean Edward, MD  11/09/22 1913

## 2022-11-10 NOTE — CONSULTS
"      [facility]    Stroke Consult Note    Reason for Consult: Stroke Code     Chief Complaint: Dizziness      HPI  Grace Jorgensen is a 72 year old female with HTN, Afib, prior stroke, chronic dizziness, who presents to the ED after feeling ascending heat and the sensation that she would pass out.  Afterwards she had nausea and vomiting.  Her symptoms have resolved entirely in the ED.    She states she has similar episodes frequently where this happens and she loses consciousness.  Afterwards her bilateral LEs \"do not work for 30 minutes\".  She then has gradual improvement of these symptoms.  She does not seek medical care when this occurs as it is common.  She takes a nap and it improves.    She states her episode today was different because she did not pass out, but instead of being nauseated had actual vomiting.  She was with family who insisted she present to ED.  Of note, she is on Coumadin for Afib, but has held it for 3 days due to a skin tear.  Her INR is 1.0    Imaging Findings  Head Ct unrevealing  CTA unable to complete due to multiple blown IVs, patient declined further attempts.  Unable to get MRI due to simulator    Intravenous Thrombolysis  Not given due to:   - minor/isolated/quickly resolving symptoms    Endovascular Treatment  Not initiated due to absence of proximal vessel occlusion    Impression   1. Pre-snycope in the setting of vomiting vs TIA vs other    Recommendations  1. No acute stroke intervention.  Stroke code de-escalated  2. Patient would like to be discharged without further work-up.  While presentation is atypical for stroke, given discontinuation of Coumadin and prior stroke, it is certainly in the differential  --patient declined additional work-up  --Bridge with Lovenox to therapeutic INR 2.0-3.0      Patient Follow-up     --General Neurology 4-6 weeks    Thank you for this consult.     Fredis Aranda MD, MS  Vascular Neurology  To page me or " "covering stroke neurology team member, click here: [facility]   Choose \"On Call\" tab at top, then search dropdown box for \"Neurology Adult\", select location, press Enter, then look for stroke/neuro ICU/telestroke.    ______________________________________________________    Clinically Significant Risk Factors Present on Admission         # Hyponatremia: Lowest Na = 132 mmol/L (Ref range: 136-145) in last 2 days, will monitor as appropriate       # Drug Induced Coagulation Defect: home medication list includes an anticoagulant medication        Past Medical History   No past medical history on file.  Past Surgical History   No past surgical history on file.  Medications   Home Meds  Prior to Admission medications    Medication Sig Start Date End Date Taking? Authorizing Provider   amLODIPine (NORVASC) 5 MG tablet Take 10 mg by mouth daily   Yes Unknown, Entered By History   atorvastatin (LIPITOR) 20 MG tablet Take 20 mg by mouth daily   Yes Unknown, Entered By History   citalopram (CELEXA) 40 MG tablet Take 40 mg by mouth daily   Yes Unknown, Entered By History   enoxaparin ANTICOAGULANT (LOVENOX ANTICOAGULANT) 60 MG/0.6ML syringe Inject 0.6 mLs (60 mg) Subcutaneous every 12 hours for 5 days 11/9/22 11/14/22 Yes Marcelo Moya MD   lisinopril (ZESTRIL) 40 MG tablet Take 40 mg by mouth daily   Yes Unknown, Entered By History   metFORMIN (GLUCOPHAGE) 500 MG tablet Take 500 mg by mouth daily (with breakfast)   Yes Unknown, Entered By History   pantoprazole (PROTONIX) 20 MG EC tablet Take 40 mg by mouth 2 times daily   Yes Unknown, Entered By History   warfarin ANTICOAGULANT (COUMADIN) 5 MG tablet Take 10 mg by mouth daily Sunday, Tuesday, Thursday, Saturday   Yes Unknown, Entered By History   warfarin ANTICOAGULANT (COUMADIN) 5 MG tablet Take 5 mg by mouth Every Monday, Wednesday, and Friday   Yes Reported, Patient   ketoconazole (NIZORAL) 2 % external shampoo Apply topically daily as needed for itching or " irritation    Unknown, Entered By History       Scheduled Meds      Infusion Meds    sodium chloride         PRN Meds      Allergies   Allergies   Allergen Reactions     Bupropion GI Disturbance     Family History   No family history on file.  Social History        Review of Systems   The 10 point Review of Systems is negative other than noted in the HPI or here.        PHYSICAL EXAMINATION  Temp:  [98.7  F (37.1  C)] 98.7  F (37.1  C)  Pulse:  [74-81] 81  Resp:  [13-20] 20  BP: (113-157)/(69-75) 157/75  SpO2:  [90 %-95 %] 95 %     MENTAL STATUS:  Alert, oriented x3.  Speech fluent with normal naming, repetition, comprehension.  No neglect. Good historian.    CRANIAL NERVES:  Pupils are equal, round, reactive to light.  Extraocular movements full.  Visual fields full.  Facial sensation, movement normal.  Palate moves symmetrically.  Tongue midline.  Sternocleidomastoid and trapezius strength intact.    Motor: 5/5, normal tone  Sensory: intact to light touch, temperature  Coordination: Good finger-nose-finger, fine finger movement, heel-shin maneuver      Dysphagia Screen  Per Nursing    Stroke Scales    NIHSS  1a. Level of Consciousness 0-->Alert, keenly responsive   1b. LOC Questions 0-->Answers both questions correctly   1c. LOC Commands 0-->Performs both tasks correctly   2.   Best Gaze 0-->Normal   3.   Visual 0-->No visual loss   4.   Facial Palsy 0-->Normal symmetrical movements   5a. Motor Arm, Left 0-->No drift, limb holds 90 (or 45) degrees for full 10 secs   5b. Motor Arm, Right 0-->No drift, limb holds 90 (or 45) degrees for full 10 secs   6a. Motor Leg, Left 0-->No drift, leg holds 30 degree position for full 5 secs   6b. Motor Leg, right 0-->No drift, leg holds 30 degree position for full 5 secs   7.   Limb Ataxia 0-->Absent   8.   Sensory 0-->Normal, no sensory loss   9.   Best Language 0-->No aphasia, normal   10. Dysarthria 0-->Normal   11. Extinction and Inattention  0-->No abnormality   Total 0  (11/09/22 2145)       Modified Washington Score (Pre-morbid)    -      Imaging  I personally reviewed all imaging; relevant findings per HPI.     Lab Results Data   CBC  Recent Labs   Lab 11/09/22  1619   WBC 8.8   RBC 3.97   HGB 10.5*   HCT 33.0*        Basic Metabolic Panel    Recent Labs   Lab 11/09/22  1619   *   POTASSIUM 4.1   CHLORIDE 102   CO2 21   BUN 15   CR 1.14*   *   SERA 9.1     Liver Panel  No results for input(s): PROTTOTAL, ALBUMIN, BILITOTAL, ALKPHOS, AST, ALT, BILIDIRECT in the last 168 hours.  INR    Recent Labs   Lab Test 11/09/22  1619 09/04/22  1955 11/06/21  2358   INR 1.02 1.03 4.89*      Lipid Profile  No lab results found.  A1C  No lab results found.  Troponin    Recent Labs   Lab 11/09/22  1619   TROPONINIS 5          Stroke Code Data Data   Stroke Code Data  (for stroke code with tele)  Stroke code activated 11/09/22   1610   First stroke provider response 11/09/22   1615   Video start time         Video end time         Last known normal 11/09/22   1300   Time of discovery  (or onset of symptoms)  11/09/22   1300   Head CT read by Stroke Neuro Dr/Provider 11/09/22   1635   Was stroke code de-escalated? Yes 11/09/22 1730           Telestroke Service Details  Type of service telemedicine diagnostic assessment of acute neurological changes   Reason telemedicine is appropriate patient requires assessment with a specialist for diagnosis and treatment of neurological symptoms   Mode of transmission secure interactive audio and video communication per Konrad   Originating site (patient location) [facility]    Distant site (provider location) [facility]       I saw Grace Jorgensen on 11/09/22 as a STROKE CODE activation.  Grace Jorgensen was in critical condition due to acute onset neurologic deficits consisting of dizziness and vomiting due to suspected ischemic or hemorrhagic stroke--she was at high risk of  neurologic deterioration from complications of stroke or stroke reperfusion therapy.  Both intravenous thrombolysis and endovascular thrombectomy were considered, but were ultimately deferred upon completion of her emergent clinical evaluation and review of her stat neuroimaging. The stroke code was de-escalated at that time.  I spent 40 minutes critical care decision-making time emergently evaluating and managing this patient's stroke code activation

## 2023-09-28 ENCOUNTER — APPOINTMENT (OUTPATIENT)
Dept: ULTRASOUND IMAGING | Facility: CLINIC | Age: 73
End: 2023-09-28
Attending: EMERGENCY MEDICINE
Payer: COMMERCIAL

## 2023-09-28 ENCOUNTER — HOSPITAL ENCOUNTER (EMERGENCY)
Facility: CLINIC | Age: 73
Discharge: HOME | End: 2023-09-28
Attending: EMERGENCY MEDICINE | Admitting: EMERGENCY MEDICINE
Payer: COMMERCIAL

## 2023-09-28 VITALS
TEMPERATURE: 98.6 F | RESPIRATION RATE: 16 BRPM | DIASTOLIC BLOOD PRESSURE: 92 MMHG | SYSTOLIC BLOOD PRESSURE: 164 MMHG | HEART RATE: 90 BPM | OXYGEN SATURATION: 99 %

## 2023-09-28 DIAGNOSIS — M79.622 PAIN OF LEFT UPPER ARM: ICD-10-CM

## 2023-09-28 DIAGNOSIS — R23.3 ABNORMAL BRUISING: ICD-10-CM

## 2023-09-28 LAB
ANION GAP SERPL CALCULATED.3IONS-SCNC: 16 MMOL/L (ref 7–15)
BASOPHILS # BLD AUTO: 0.1 10E3/UL (ref 0–0.2)
BASOPHILS NFR BLD AUTO: 1 %
BUN SERPL-MCNC: 15 MG/DL (ref 8–23)
CALCIUM SERPL-MCNC: 9 MG/DL (ref 8.8–10.2)
CHLORIDE SERPL-SCNC: 99 MMOL/L (ref 98–107)
CK SERPL-CCNC: 89 U/L (ref 26–192)
CREAT SERPL-MCNC: 0.88 MG/DL (ref 0.51–0.95)
EGFRCR SERPLBLD CKD-EPI 2021: 69 ML/MIN/1.73M2
EOSINOPHIL # BLD AUTO: 0.1 10E3/UL (ref 0–0.7)
EOSINOPHIL NFR BLD AUTO: 2 %
ERYTHROCYTE [DISTWIDTH] IN BLOOD BY AUTOMATED COUNT: 19.6 % (ref 10–15)
GLUCOSE SERPL-MCNC: 128 MG/DL (ref 70–99)
HCO3 SERPL-SCNC: 21 MMOL/L (ref 22–29)
HCT VFR BLD AUTO: 27.7 % (ref 35–47)
HGB BLD-MCNC: 8.9 G/DL (ref 11.7–15.7)
IMM GRANULOCYTES # BLD: 0 10E3/UL
IMM GRANULOCYTES NFR BLD: 0 %
INR PPP: 3.18 (ref 0.85–1.15)
LYMPHOCYTES # BLD AUTO: 4.7 10E3/UL (ref 0.8–5.3)
LYMPHOCYTES NFR BLD AUTO: 52 %
MCH RBC QN AUTO: 26.2 PG (ref 26.5–33)
MCHC RBC AUTO-ENTMCNC: 32.1 G/DL (ref 31.5–36.5)
MCV RBC AUTO: 82 FL (ref 78–100)
MONOCYTES # BLD AUTO: 0.7 10E3/UL (ref 0–1.3)
MONOCYTES NFR BLD AUTO: 8 %
NEUTROPHILS # BLD AUTO: 3.3 10E3/UL (ref 1.6–8.3)
NEUTROPHILS NFR BLD AUTO: 37 %
NRBC # BLD AUTO: 0 10E3/UL
NRBC BLD AUTO-RTO: 0 /100
PLATELET # BLD AUTO: 404 10E3/UL (ref 150–450)
POTASSIUM SERPL-SCNC: 3.9 MMOL/L (ref 3.4–5.3)
RBC # BLD AUTO: 3.4 10E6/UL (ref 3.8–5.2)
SODIUM SERPL-SCNC: 136 MMOL/L (ref 135–145)
WBC # BLD AUTO: 8.9 10E3/UL (ref 4–11)

## 2023-09-28 PROCEDURE — 82550 ASSAY OF CK (CPK): CPT | Performed by: EMERGENCY MEDICINE

## 2023-09-28 PROCEDURE — 96375 TX/PRO/DX INJ NEW DRUG ADDON: CPT

## 2023-09-28 PROCEDURE — 85610 PROTHROMBIN TIME: CPT | Performed by: EMERGENCY MEDICINE

## 2023-09-28 PROCEDURE — 36415 COLL VENOUS BLD VENIPUNCTURE: CPT | Performed by: EMERGENCY MEDICINE

## 2023-09-28 PROCEDURE — 76882 US LMTD JT/FCL EVL NVASC XTR: CPT | Mod: LT

## 2023-09-28 PROCEDURE — 80048 BASIC METABOLIC PNL TOTAL CA: CPT | Performed by: EMERGENCY MEDICINE

## 2023-09-28 PROCEDURE — 250N000011 HC RX IP 250 OP 636: Performed by: EMERGENCY MEDICINE

## 2023-09-28 PROCEDURE — 85025 COMPLETE CBC W/AUTO DIFF WBC: CPT | Performed by: EMERGENCY MEDICINE

## 2023-09-28 PROCEDURE — 99285 EMERGENCY DEPT VISIT HI MDM: CPT | Mod: 25

## 2023-09-28 PROCEDURE — 96374 THER/PROPH/DIAG INJ IV PUSH: CPT

## 2023-09-28 RX ORDER — MORPHINE SULFATE 4 MG/ML
3 INJECTION, SOLUTION INTRAMUSCULAR; INTRAVENOUS ONCE
Status: COMPLETED | OUTPATIENT
Start: 2023-09-28 | End: 2023-09-28

## 2023-09-28 RX ORDER — ONDANSETRON 2 MG/ML
4 INJECTION INTRAMUSCULAR; INTRAVENOUS EVERY 30 MIN PRN
Status: DISCONTINUED | OUTPATIENT
Start: 2023-09-28 | End: 2023-09-28 | Stop reason: HOSPADM

## 2023-09-28 RX ADMIN — ONDANSETRON 4 MG: 2 INJECTION INTRAMUSCULAR; INTRAVENOUS at 06:57

## 2023-09-28 RX ADMIN — MORPHINE SULFATE 3 MG: 4 INJECTION, SOLUTION INTRAMUSCULAR; INTRAVENOUS at 06:58

## 2023-09-28 ASSESSMENT — ACTIVITIES OF DAILY LIVING (ADL)
ADLS_ACUITY_SCORE: 33
ADLS_ACUITY_SCORE: 35

## 2023-09-28 NOTE — ED TRIAGE NOTES
Left arm bruise, painful from shoulder to wrist x 2 days. Pt takes warfarin. Denies any injury but she slept on her arm for 12+ hours on sunday.     Triage Assessment       Row Name 09/28/23 0446       Triage Assessment (Adult)    Airway WDL WDL       Respiratory WDL    Respiratory WDL WDL       Skin Circulation/Temperature WDL    Skin Circulation/Temperature WDL X  left arm bruised       Cardiac WDL    Cardiac WDL WDL       Peripheral/Neurovascular WDL    Peripheral Neurovascular WDL WDL       Cognitive/Neuro/Behavioral WDL    Cognitive/Neuro/Behavioral WDL WDL

## 2023-09-28 NOTE — ED PROVIDER NOTES
"  History     Chief Complaint:  Arm Pain       HPI   Grace Jorgensen is a 73 year old female who presents with left arm pain and swelling and discoloration that began in the last 24 hours, but patient also notes that she \"slept on this arm\" for the entirety of the night about 3 days ago.  She states that she takes Benadryl before she falls asleep, and usually will spend some time sleeping on either side before laying on her back for the rest of the night.  She notes that she forgot because she was \"knocked out\", and spends the entirety of the night with her body laying on top of her left arm.  She states that her pain is constant, nonradiating, and is not accompanied by any new weakness or tingling.  She does have a history of hardware being placed after fracture about a decade ago, but no acute trauma.      Independent Historian:   Yes, son is at bedside, confirms above history    Review of External Notes: Yes, I have reviewed patient's office visit via her family medicine provider on 5 September of 2023 where she was seen for her annual wellness exam.      Allergies:  Bupropion     Medications:    amLODIPine (NORVASC) 5 MG tablet  atorvastatin (LIPITOR) 20 MG tablet  citalopram (CELEXA) 40 MG tablet  ketoconazole (NIZORAL) 2 % external shampoo  lisinopril (ZESTRIL) 40 MG tablet  metFORMIN (GLUCOPHAGE) 500 MG tablet  pantoprazole (PROTONIX) 20 MG EC tablet  warfarin ANTICOAGULANT (COUMADIN) 5 MG tablet  warfarin ANTICOAGULANT (COUMADIN) 5 MG tablet        Past Medical History:    No past medical history on file.    Past Surgical History:    No past surgical history on file.     Family History:    family history is not on file.    Social History:     PCP: [facility]     Physical Exam   Patient Vitals for the past 24 hrs:   BP Temp Temp src Pulse Resp SpO2   09/28/23 0449 (!) 180/84 98.6  F (37  C) Oral 120 16 99 %        Physical Exam  Vitals: reviewed by me  General: Pt seen on hospital " gurney, pleasant, cooperative, and alert to conversation  Eyes: Tracking well, clear conjunctiva BL  ENT: MMM, midline trachea.   Lungs: No tachypnea, no accessory muscle use. No respiratory distress.   CV: Rate as above  MSK: no joint effusion.  Left upper extremity is warm well perfused, does have pain with ranging, but no tenderness to any specific joints.  Does have what appears to be subacute hematoma in the gravity dependent portions of her entire arm essentially, with a hematoma most focally noted at the posterior aspect of the upper arm.  Skin: No rash  Neuro: Clear speech and no facial droop.  Psych: Not RIS, no e/o AH/VH          Emergency Department Course         Imaging:  US Upper Extremity Non Vascular Left   Final Result   IMPRESSION: Sonographic images in the area of concern in the left   forearm and elbow demonstrate echogenic, indurated soft tissues with   some infiltrative soft tissue edema. No well-defined drainable soft   tissue fluid collection to suggest hematoma.      JONN HAWTHORNE MD            SYSTEM ID:  [id number]         Report per radiology    Laboratory:  Labs Ordered and Resulted from Time of ED Arrival to Time of ED Departure   BASIC METABOLIC PANEL - Abnormal       Result Value    Sodium 136      Potassium 3.9      Chloride 99      Carbon Dioxide (CO2) 21 (*)     Anion Gap 16 (*)     Urea Nitrogen 15.0      Creatinine 0.88      GFR Estimate 69      Calcium 9.0      Glucose 128 (*)    INR - Abnormal    INR 3.18 (*)    CBC WITH PLATELETS AND DIFFERENTIAL - Abnormal    WBC Count 8.9      RBC Count 3.40 (*)     Hemoglobin 8.9 (*)     Hematocrit 27.7 (*)     MCV 82      MCH 26.2 (*)     MCHC 32.1      RDW 19.6 (*)     Platelet Count 404      % Neutrophils 37      % Lymphocytes 52      % Monocytes 8      % Eosinophils 2      % Basophils 1      % Immature Granulocytes 0      NRBCs per 100 WBC 0      Absolute Neutrophils 3.3      Absolute Lymphocytes 4.7      Absolute Monocytes 0.7       Absolute Eosinophils 0.1      Absolute Basophils 0.1      Absolute Immature Granulocytes 0.0      Absolute NRBCs 0.0     CK TOTAL - Normal    CK 89         Emergency Department Course & Assessments:        Interventions:  Medications   morphine (PF) injection 3 mg (has no administration in time range)   ondansetron (ZOFRAN) injection 4 mg (has no administration in time range)            Social Determinants of Health affecting care:   Stress/Adjustment Disorders      Disposition:  The patient was discharged to home.     Impression & Plan        Medical Decision Making:  This is a pleasant 73-year-old female presents emergency room with appears to be mild bleeding inside of her left arm after a formal compression trauma.  She does have an elevated INR consistent with taking Coumadin, and does sound like she may have disrupted some capillary beds or small vessels due to sleeping on her arm all night.  This did place several days ago, and thankfully her CK is unremarkable and I see no evidence of muscle death.  She has no trauma apart from this, and her pain is been controlled here in the ER.  Ultrasound done and shows what appears to be edema, likely for blood product, as the patient does have clear ecchymotic changes in the dependent areas.  I do think she stable for discharge home with reassurance, I have recommended a compression sleeve as well as elevation and Tylenol for her pain.  Saw the bedside okay with this plan as well, will plan for discharge as above and outpatient follow-up as this does continue to resolve.  No evidence of acute trauma or bleeding, no evidence of secondary infection or emergent complication like compartment syndrome after several days, and her upper extremities warm, well-perfused distally, and neurovascular intact    Diagnosis:    ICD-10-CM    1. Abnormal bruising  R23.3       2. Pain of left upper arm  M79.622              9/28/2023   Javier Mayen*        Javier Mayen  Jack Orr MD  09/28/23 3978

## 2023-09-28 NOTE — DISCHARGE INSTRUCTIONS
As we discussed, there is no drainable fluid collection on your ultrasound, but it does look like you have blood that has essentially trickled down to the gravity dependent areas of your arm.  Please elevate your arm at night to facilitate drainage, and feel free to purchase an arm sleeve that also could provide some degree of compression.  Please follow with your regular doctor in the next 3 to 4 days for a checkup to make sure things are not getting worse, and come back to the ER if anything changes.  This will likely self resolve.

## 2023-09-29 ENCOUNTER — APPOINTMENT (OUTPATIENT)
Dept: ULTRASOUND IMAGING | Facility: CLINIC | Age: 73
End: 2023-09-29
Attending: STUDENT IN AN ORGANIZED HEALTH CARE EDUCATION/TRAINING PROGRAM
Payer: COMMERCIAL

## 2023-09-29 ENCOUNTER — APPOINTMENT (OUTPATIENT)
Dept: GENERAL RADIOLOGY | Facility: CLINIC | Age: 73
End: 2023-09-29
Attending: STUDENT IN AN ORGANIZED HEALTH CARE EDUCATION/TRAINING PROGRAM
Payer: COMMERCIAL

## 2023-09-29 ENCOUNTER — HOSPITAL ENCOUNTER (EMERGENCY)
Facility: CLINIC | Age: 73
Discharge: HOME | End: 2023-09-29
Attending: EMERGENCY MEDICINE | Admitting: EMERGENCY MEDICINE
Payer: COMMERCIAL

## 2023-09-29 VITALS
DIASTOLIC BLOOD PRESSURE: 76 MMHG | TEMPERATURE: 98.7 F | OXYGEN SATURATION: 98 % | SYSTOLIC BLOOD PRESSURE: 135 MMHG | HEART RATE: 80 BPM | RESPIRATION RATE: 18 BRPM

## 2023-09-29 DIAGNOSIS — R23.3 ABNORMAL BRUISING: ICD-10-CM

## 2023-09-29 DIAGNOSIS — M79.89 LEFT ARM SWELLING: ICD-10-CM

## 2023-09-29 DIAGNOSIS — Z79.01 ANTICOAGULATED: ICD-10-CM

## 2023-09-29 LAB
BASOPHILS # BLD AUTO: 0 10E3/UL (ref 0–0.2)
BASOPHILS NFR BLD AUTO: 1 %
EOSINOPHIL # BLD AUTO: 0.2 10E3/UL (ref 0–0.7)
EOSINOPHIL NFR BLD AUTO: 3 %
ERYTHROCYTE [DISTWIDTH] IN BLOOD BY AUTOMATED COUNT: 19.9 % (ref 10–15)
HCT VFR BLD AUTO: 27.7 % (ref 35–47)
HGB BLD-MCNC: 8.4 G/DL (ref 11.7–15.7)
IMM GRANULOCYTES # BLD: 0 10E3/UL
IMM GRANULOCYTES NFR BLD: 0 %
INR PPP: 2.47 (ref 0.85–1.15)
LYMPHOCYTES # BLD AUTO: 2.4 10E3/UL (ref 0.8–5.3)
LYMPHOCYTES NFR BLD AUTO: 36 %
MCH RBC QN AUTO: 25.4 PG (ref 26.5–33)
MCHC RBC AUTO-ENTMCNC: 30.3 G/DL (ref 31.5–36.5)
MCV RBC AUTO: 84 FL (ref 78–100)
MONOCYTES # BLD AUTO: 0.5 10E3/UL (ref 0–1.3)
MONOCYTES NFR BLD AUTO: 8 %
NEUTROPHILS # BLD AUTO: 3.5 10E3/UL (ref 1.6–8.3)
NEUTROPHILS NFR BLD AUTO: 52 %
NRBC # BLD AUTO: 0 10E3/UL
NRBC BLD AUTO-RTO: 0 /100
PLATELET # BLD AUTO: 371 10E3/UL (ref 150–450)
RBC # BLD AUTO: 3.31 10E6/UL (ref 3.8–5.2)
WBC # BLD AUTO: 6.6 10E3/UL (ref 4–11)

## 2023-09-29 PROCEDURE — 73060 X-RAY EXAM OF HUMERUS: CPT | Mod: LT

## 2023-09-29 PROCEDURE — 85025 COMPLETE CBC W/AUTO DIFF WBC: CPT | Performed by: STUDENT IN AN ORGANIZED HEALTH CARE EDUCATION/TRAINING PROGRAM

## 2023-09-29 PROCEDURE — 85610 PROTHROMBIN TIME: CPT | Performed by: STUDENT IN AN ORGANIZED HEALTH CARE EDUCATION/TRAINING PROGRAM

## 2023-09-29 PROCEDURE — 36415 COLL VENOUS BLD VENIPUNCTURE: CPT | Performed by: STUDENT IN AN ORGANIZED HEALTH CARE EDUCATION/TRAINING PROGRAM

## 2023-09-29 PROCEDURE — 250N000013 HC RX MED GY IP 250 OP 250 PS 637: Performed by: EMERGENCY MEDICINE

## 2023-09-29 PROCEDURE — 99284 EMERGENCY DEPT VISIT MOD MDM: CPT | Mod: 25

## 2023-09-29 PROCEDURE — 93971 EXTREMITY STUDY: CPT | Mod: LT

## 2023-09-29 RX ORDER — OXYCODONE HYDROCHLORIDE 5 MG/1
5 TABLET ORAL EVERY 6 HOURS PRN
Qty: 6 TABLET | Refills: 0 | Status: ON HOLD | OUTPATIENT
Start: 2023-09-29 | End: 2023-10-21

## 2023-09-29 RX ORDER — OXYCODONE HYDROCHLORIDE 5 MG/1
5 TABLET ORAL ONCE
Status: COMPLETED | OUTPATIENT
Start: 2023-09-29 | End: 2023-09-29

## 2023-09-29 RX ADMIN — OXYCODONE HYDROCHLORIDE 5 MG: 5 TABLET ORAL at 19:51

## 2023-09-29 ASSESSMENT — ACTIVITIES OF DAILY LIVING (ADL)
ADLS_ACUITY_SCORE: 35
ADLS_ACUITY_SCORE: 35

## 2023-09-29 NOTE — ED PROVIDER NOTES
History     Chief Complaint:  Arm Injury       The history is provided by the patient and the spouse.      Grace Jorgensen is a 73 year old female on Warfarin with a history of multiple strokes and abnormal heart rhythms who presents with pain and discoloration in her left arm. She states that her symptoms started 2 nights ago, and that the pain is centered in her bicep region and radiates to her left shoulder. She adds that it is exacerbated by moving her fingers. Grace rates the severity of her pain at 3/10 at rest and 10/10 when she is moving her arm. Her  states that Grace has had issues in the past with her left arm, and Grace notes a history of surgery in that region. Her  reports that the swelling in her arm has increased about 40% since yesterday. She reports an INR level of 3 last night. Grace denies recent trauma or injury to her left arm.     Independent Historian:   Spouse/Partner - They report as noted above.    Review of External Notes:   Reviewed ED note from yesterday at [facility]     Medications:    Amlodipine   Atorvastatin    Citalopram  Lisinopril    Metformin   Pantoprazole   Warfarin   Celexa  Furosemide  Omeprazole   Hydrocodone-acetaminophen   Sucralfate   Acetaminophen-codeine  Albuterol    Past Medical History:    Paroxysmal atrial fibrillation   Embolic stroke  Cerebral ataxia  Tobacco use disorder   Hyponatremia   Hyperlipidemia   Hematemesis   Iron deficiency anemia   Anxiety   GERD  Unspecified constipation   Humerus head fracture   Type 2 diabetes mellitus  Dyslipidemia  Chronic heart failure with preserved ejection fraction   Hallux valgus   Gastroparesis  Hiatal hernia   Hypertension   Restless leg syndrome   Depression   Cerebral vascular accident     Past Surgical History:    Mastectomy      Physical Exam   Patient Vitals for the past 24 hrs:   BP Temp Temp src Pulse Resp SpO2   09/29/23 1805 (!) 142/92 98.7  F (37.1  C) Oral 85 17 96 %         Physical Exam  General: Alert and cooperative with exam. Patient in no apparent distress. Normal mentation.  Head:  Scalp is NC/AT  Eyes:  No scleral icterus, PERRL  ENT:  The external nose and ears are normal.   Neck:  Normal range of motion without rigidity.  CV:  Regular rate  Resp:  Breath sounds are clear bilaterally    Non-labored, no retractions or accessory muscle use  GI:  Abdomen is soft, no distension, no tenderness. No peritoneal signs  MS:  Ecchymosis to L upper extremity, most notable to forearm in areas of gravity dependence with extension into hand. Mild swelling present to entire extremity. Mild pain with ranging, however fully able to. No pain to palpation of forearm. Minimal tenderness to upper arm and elbow.  Skin:  Ecchymosis to LUE  Neuro:  Oriented x 3. No gross motor deficits.      Emergency Department Course     Imaging:  US Upper Extremity Venous Duplex Left   Final Result   IMPRESSION:    1.  No deep venous thrombosis in the left upper extremity.      Humerus XR,  G/E 2 views, left   Final Result   IMPRESSION: Glenohumeral joint replacement with surgical fixation along the glenoid. There is bone demineralization and cortical thinning along the proximal and mid humeral shaft which is probably chronic. No evidence of an acute displaced humerus    fracture. Bones are demineralized.       Note is made that the humeral head implant appears to be superiorly subluxed on this radiograph which may be related to positional changes although malalignment of the implant and/or rotator cuff insufficiency could cause this appearance. This is likely    a chronic finding however and was probably present in retrospect on the 04/21/2020 rib and chest series. If there is concern for an acute shoulder abnormality, recommend dedicated shoulder radiographs.      NOTE: ABNORMAL REPORT      THE DICTATION ABOVE DESCRIBES AN ABNORMALITY FOR WHICH FOLLOW-UP IS NEEDED.          Report per  radiology    Laboratory:  Labs Ordered and Resulted from Time of ED Arrival to Time of ED Departure   INR - Abnormal       Result Value    INR 2.47 (*)    CBC WITH PLATELETS AND DIFFERENTIAL - Abnormal    WBC Count 6.6      RBC Count 3.31 (*)     Hemoglobin 8.4 (*)     Hematocrit 27.7 (*)     MCV 84      MCH 25.4 (*)     MCHC 30.3 (*)     RDW 19.9 (*)     Platelet Count 371      % Neutrophils 52      % Lymphocytes 36      % Monocytes 8      % Eosinophils 3      % Basophils 1      % Immature Granulocytes 0      NRBCs per 100 WBC 0      Absolute Neutrophils 3.5      Absolute Lymphocytes 2.4      Absolute Monocytes 0.5      Absolute Eosinophils 0.2      Absolute Basophils 0.0      Absolute Immature Granulocytes 0.0      Absolute NRBCs 0.0          Procedures   None    Emergency Department Course & Assessments:    Interventions:  Medications   oxyCODONE (ROXICODONE) tablet 5 mg (5 mg Oral $Given 9/29/23 1951)        Assessments:  1821 I obtained the patient's history and examined as noted above.      Independent Interpretation (X-rays, CTs, rhythm strip):  LUE imaging - hardware in place, no evidence of fracture or dislocation    Consultations/Discussion of Management or Tests:  None        Social Determinants of Health affecting care:   None    Disposition:  The patient was discharged to home.     Impression & Plan      Medical Decision Making:  Grace Jorgensen is a 73 year old female who presents with LUE pain and ecchymosis. Seen at [facility] ER yesterday for same complaint. Denies trauma to area, instead reporting prolonged amount of time lying on this extremity. Concerned for possible rhabdo, however her CK was normal at yesterday's visit. Patient is chronically anticoagulated on warfarin, INR yesterday mildly supratherapeutic at 3.18, today 2.47. Last night she held her warfarin. Ultrasound completed yesterday without DVT, with evidence of soft tissue edema. No fluid collection to suggest hematoma. She was  instructed to wear compression sleeve and continue tylenol at home. Complains of ongoing upper extremity discomfort today. Given her history of L shoulder hardware and increased upper extremity pain, humerus xray completed and thankfully without evidence of hardware concerns, fractures, or dislocations. On exam, she has ecchymosis to gravity dependent areas of left forearm with extension into hand. No warmth or erythema to suggest infection. Her hgb is low, 8.4 today, however she is chronically anemic. Likely sustained capillary injury when laying on arm for extended period. I provided reassurance to the patient and discussed that this ecchymosis may take some time to resolve but strongly encouraged her to continue wearing her compression sleeve and continue tylenol for discomfort. Patient requested further work up and pain control as she was not satisfied with this work up. Repeat ultrasound was completed without any acute changes. Patient was provided oxycodone in the ER for symptomatic relief. Discussed negative ultrasound with patient and recommend she follow up with PCP in one week. No need for warfarin changes today as her INR is therapeutic. She was provided sling for comfort.      Diagnosis:    ICD-10-CM    1. Left arm swelling  M79.89 Neck/Shoulder/Back Order Sling; Left      2. Abnormal bruising  R23.3 Neck/Shoulder/Back Order Sling; Left      3. Anticoagulated  Z79.01           Scribe Disclosure:  I, Anita Clement, am serving as a scribe at 6:25 PM on 9/29/2023 to document services personally performed by Zulema Johnson PA-C  based on my observations and the provider's statements to me.     9/29/2023   MARCELL Schultz Lauren R, PA-C  09/29/23 2108

## 2023-09-29 NOTE — ED TRIAGE NOTES
Pt complains of ecchymosis & swelling throughout L arm that started about 2 days and has gotten progressively worse. Pt takes warfarin daily. No known trauma. Radial pulse palpated. Pt has hx of surgery w/ janie placement in L upper arm.

## 2023-09-30 NOTE — ED NOTES
Emergency Department Attending Supervision Note        I evaluated this patient with Zulema Johnson PA-C.       Briefly, the patient presented with ongoing and spreading ecchymosis to the left arm that started 2 days ago.  She is anticoagulated and denies any trauma.  Exam shows good sensation and motor strength in the radian, ulnar, and median distribution.  Exam also shows contusions to the medial and posterior left arm from the bicep to the mid forearm volar aspect.  No pain out of portion exam.  X-ray shows no concerning findings.  She feels better after oxycodone and sling.  Plan continue supportive cares at home, continue to hold Coumadin as her INR has come down nicely, and primary care follow-up in 2 to 3 days.  Return precautions for worse pain, swelling, or any other concerns.      Independent interpretation: X-ray left humerus shows no fracture or dislocation    Review of external records: Reviewed 9/5/2023 office visit          Visit Diagnosis, Associated Orders, and Comments     ICD-10-CM    1. Left arm swelling  M79.89 Neck/Shoulder/Back Order Sling; Left           2. Abnormal bruising  R23.3 Neck/Shoulder/Back Order Sling; Left           3. Anticoagulated  Z79.01           Koffi Tapia MD  [facility]  [facility] Emergency Department           Koffi Tapia MD  09/30/23 0100

## 2023-09-30 NOTE — DISCHARGE INSTRUCTIONS
The swelling and bruising to the arm will take some time to resolve. I strongly encourage you continue wearing compression sleeve and use tylenol every 4-6 hours for ongoing discomfort. INR is within normal limits today so continue your warfarin regimen.    Follow up with your primary provider in 1 week for reassessment.

## 2023-10-20 ENCOUNTER — APPOINTMENT (OUTPATIENT)
Dept: CT IMAGING | Facility: CLINIC | Age: 73
DRG: 545 | End: 2023-10-20
Attending: STUDENT IN AN ORGANIZED HEALTH CARE EDUCATION/TRAINING PROGRAM
Payer: COMMERCIAL

## 2023-10-20 ENCOUNTER — HOSPITAL ENCOUNTER (INPATIENT)
Facility: CLINIC | Age: 73
LOS: 5 days | Discharge: HOME | DRG: 545 | End: 2023-10-25
Attending: EMERGENCY MEDICINE | Admitting: INTERNAL MEDICINE
Payer: COMMERCIAL

## 2023-10-20 ENCOUNTER — HOSPITAL ENCOUNTER (EMERGENCY)
Facility: CLINIC | Age: 73
Discharge: HOME | DRG: 545 | End: 2023-10-20
Attending: STUDENT IN AN ORGANIZED HEALTH CARE EDUCATION/TRAINING PROGRAM | Admitting: STUDENT IN AN ORGANIZED HEALTH CARE EDUCATION/TRAINING PROGRAM
Payer: COMMERCIAL

## 2023-10-20 VITALS
SYSTOLIC BLOOD PRESSURE: 152 MMHG | OXYGEN SATURATION: 97 % | RESPIRATION RATE: 18 BRPM | TEMPERATURE: 97 F | HEART RATE: 92 BPM | DIASTOLIC BLOOD PRESSURE: 74 MMHG

## 2023-10-20 DIAGNOSIS — M54.2 CERVICALGIA: ICD-10-CM

## 2023-10-20 DIAGNOSIS — R51.9 ACUTE INTRACTABLE HEADACHE, UNSPECIFIED HEADACHE TYPE: ICD-10-CM

## 2023-10-20 DIAGNOSIS — D50.9 IRON DEFICIENCY ANEMIA, UNSPECIFIED IRON DEFICIENCY ANEMIA TYPE: ICD-10-CM

## 2023-10-20 DIAGNOSIS — G30.9 MILD ALZHEIMER'S DEMENTIA WITHOUT BEHAVIORAL DISTURBANCE, PSYCHOTIC DISTURBANCE, MOOD DISTURBANCE, OR ANXIETY, UNSPECIFIED TIMING OF DEMENTIA ONSET (H): Primary | ICD-10-CM

## 2023-10-20 DIAGNOSIS — R51.9 ACUTE INTRACTABLE HEADACHE, UNSPECIFIED HEADACHE TYPE: Primary | ICD-10-CM

## 2023-10-20 DIAGNOSIS — I10 PRIMARY HYPERTENSION: ICD-10-CM

## 2023-10-20 DIAGNOSIS — M31.6 TEMPORAL ARTERITIS (H): ICD-10-CM

## 2023-10-20 DIAGNOSIS — K31.84 GASTROPARESIS: ICD-10-CM

## 2023-10-20 DIAGNOSIS — F02.A0 MILD ALZHEIMER'S DEMENTIA WITHOUT BEHAVIORAL DISTURBANCE, PSYCHOTIC DISTURBANCE, MOOD DISTURBANCE, OR ANXIETY, UNSPECIFIED TIMING OF DEMENTIA ONSET (H): Primary | ICD-10-CM

## 2023-10-20 PROBLEM — I50.32 CHRONIC HEART FAILURE WITH PRESERVED EJECTION FRACTION (H): Status: ACTIVE | Noted: 2021-09-03

## 2023-10-20 PROBLEM — M20.42 ACQUIRED HAMMER TOES OF BOTH FEET: Status: ACTIVE | Noted: 2020-01-15

## 2023-10-20 PROBLEM — E55.9 VITAMIN D DEFICIENCY: Status: ACTIVE | Noted: 2017-09-07

## 2023-10-20 PROBLEM — Z79.01 LONG TERM (CURRENT) USE OF ANTICOAGULANTS: Status: ACTIVE | Noted: 2023-04-06

## 2023-10-20 PROBLEM — E11.9 TYPE 2 DIABETES MELLITUS (H): Status: ACTIVE | Noted: 2023-09-06

## 2023-10-20 PROBLEM — E78.5 DYSLIPIDEMIA: Status: ACTIVE | Noted: 2021-09-03

## 2023-10-20 PROBLEM — D64.9 ANEMIA, UNSPECIFIED: Status: ACTIVE | Noted: 2017-09-07

## 2023-10-20 PROBLEM — M20.11 HALLUX VALGUS (ACQUIRED), RIGHT FOOT: Status: ACTIVE | Noted: 2020-01-15

## 2023-10-20 PROBLEM — M20.41 ACQUIRED HAMMER TOES OF BOTH FEET: Status: ACTIVE | Noted: 2020-01-15

## 2023-10-20 LAB
ALBUMIN SERPL BCG-MCNC: 3.9 G/DL (ref 3.5–5.2)
ALP SERPL-CCNC: 86 U/L (ref 35–104)
ALT SERPL W P-5'-P-CCNC: 14 U/L (ref 0–50)
ANION GAP SERPL CALCULATED.3IONS-SCNC: 12 MMOL/L (ref 7–15)
AST SERPL W P-5'-P-CCNC: 23 U/L (ref 0–45)
BASO+EOS+MONOS # BLD AUTO: ABNORMAL 10*3/UL
BASO+EOS+MONOS NFR BLD AUTO: ABNORMAL %
BASOPHILS # BLD AUTO: 0 10E3/UL (ref 0–0.2)
BASOPHILS NFR BLD AUTO: 1 %
BILIRUB SERPL-MCNC: <0.2 MG/DL
BUN SERPL-MCNC: 13.9 MG/DL (ref 8–23)
CALCIUM SERPL-MCNC: 9.7 MG/DL (ref 8.8–10.2)
CHLORIDE SERPL-SCNC: 102 MMOL/L (ref 98–107)
CREAT SERPL-MCNC: 0.78 MG/DL (ref 0.51–0.95)
CRP SERPL-MCNC: <3 MG/L
DEPRECATED HCO3 PLAS-SCNC: 21 MMOL/L (ref 22–29)
EGFRCR SERPLBLD CKD-EPI 2021: 79 ML/MIN/1.73M2
EOSINOPHIL # BLD AUTO: 0 10E3/UL (ref 0–0.7)
EOSINOPHIL NFR BLD AUTO: 0 %
ERYTHROCYTE [DISTWIDTH] IN BLOOD BY AUTOMATED COUNT: 19.7 % (ref 10–15)
ERYTHROCYTE [SEDIMENTATION RATE] IN BLOOD BY WESTERGREN METHOD: 102 MM/HR (ref 0–30)
GLUCOSE SERPL-MCNC: 105 MG/DL (ref 70–99)
HCT VFR BLD AUTO: 32.1 % (ref 35–47)
HGB BLD-MCNC: 9.7 G/DL (ref 11.7–15.7)
IMM GRANULOCYTES # BLD: 0 10E3/UL
IMM GRANULOCYTES NFR BLD: 0 %
INR PPP: 2.4 (ref 0.85–1.15)
LYMPHOCYTES # BLD AUTO: 3.1 10E3/UL (ref 0.8–5.3)
LYMPHOCYTES NFR BLD AUTO: 40 %
MCH RBC QN AUTO: 25.5 PG (ref 26.5–33)
MCHC RBC AUTO-ENTMCNC: 30.2 G/DL (ref 31.5–36.5)
MCV RBC AUTO: 84 FL (ref 78–100)
MONOCYTES # BLD AUTO: 0.5 10E3/UL (ref 0–1.3)
MONOCYTES NFR BLD AUTO: 6 %
NEUTROPHILS # BLD AUTO: 4.2 10E3/UL (ref 1.6–8.3)
NEUTROPHILS NFR BLD AUTO: 53 %
NRBC # BLD AUTO: 0 10E3/UL
NRBC BLD AUTO-RTO: 0 /100
PLATELET # BLD AUTO: 429 10E3/UL (ref 150–450)
POTASSIUM SERPL-SCNC: 4.8 MMOL/L (ref 3.4–5.3)
PROT SERPL-MCNC: 7.6 G/DL (ref 6.4–8.3)
RBC # BLD AUTO: 3.81 10E6/UL (ref 3.8–5.2)
SODIUM SERPL-SCNC: 135 MMOL/L (ref 135–145)
WBC # BLD AUTO: 7.9 10E3/UL (ref 4–11)

## 2023-10-20 PROCEDURE — 83036 HEMOGLOBIN GLYCOSYLATED A1C: CPT | Performed by: INTERNAL MEDICINE

## 2023-10-20 PROCEDURE — 99223 1ST HOSP IP/OBS HIGH 75: CPT | Performed by: INTERNAL MEDICINE

## 2023-10-20 PROCEDURE — 85652 RBC SED RATE AUTOMATED: CPT | Performed by: STUDENT IN AN ORGANIZED HEALTH CARE EDUCATION/TRAINING PROGRAM

## 2023-10-20 PROCEDURE — 85610 PROTHROMBIN TIME: CPT | Performed by: STUDENT IN AN ORGANIZED HEALTH CARE EDUCATION/TRAINING PROGRAM

## 2023-10-20 PROCEDURE — 70450 CT HEAD/BRAIN W/O DYE: CPT

## 2023-10-20 PROCEDURE — 120N000001 HC R&B MED SURG/OB

## 2023-10-20 PROCEDURE — 36415 COLL VENOUS BLD VENIPUNCTURE: CPT | Performed by: STUDENT IN AN ORGANIZED HEALTH CARE EDUCATION/TRAINING PROGRAM

## 2023-10-20 PROCEDURE — 250N000011 HC RX IP 250 OP 636: Performed by: STUDENT IN AN ORGANIZED HEALTH CARE EDUCATION/TRAINING PROGRAM

## 2023-10-20 PROCEDURE — 85025 COMPLETE CBC W/AUTO DIFF WBC: CPT | Performed by: STUDENT IN AN ORGANIZED HEALTH CARE EDUCATION/TRAINING PROGRAM

## 2023-10-20 PROCEDURE — 99285 EMERGENCY DEPT VISIT HI MDM: CPT | Mod: 25

## 2023-10-20 PROCEDURE — 96374 THER/PROPH/DIAG INJ IV PUSH: CPT

## 2023-10-20 PROCEDURE — 80053 COMPREHEN METABOLIC PANEL: CPT | Performed by: STUDENT IN AN ORGANIZED HEALTH CARE EDUCATION/TRAINING PROGRAM

## 2023-10-20 PROCEDURE — 86140 C-REACTIVE PROTEIN: CPT | Performed by: STUDENT IN AN ORGANIZED HEALTH CARE EDUCATION/TRAINING PROGRAM

## 2023-10-20 PROCEDURE — 83735 ASSAY OF MAGNESIUM: CPT | Performed by: INTERNAL MEDICINE

## 2023-10-20 RX ORDER — ACETAMINOPHEN 500 MG
1000 TABLET ORAL EVERY 4 HOURS PRN
Status: DISCONTINUED | OUTPATIENT
Start: 2023-10-20 | End: 2023-10-20

## 2023-10-20 RX ORDER — KETOROLAC TROMETHAMINE 15 MG/ML
10 INJECTION, SOLUTION INTRAMUSCULAR; INTRAVENOUS ONCE
Status: DISCONTINUED | OUTPATIENT
Start: 2023-10-20 | End: 2023-10-20

## 2023-10-20 RX ORDER — HYDROMORPHONE HYDROCHLORIDE 1 MG/ML
0.3 INJECTION, SOLUTION INTRAMUSCULAR; INTRAVENOUS; SUBCUTANEOUS EVERY 30 MIN PRN
Status: COMPLETED | OUTPATIENT
Start: 2023-10-20 | End: 2023-10-22

## 2023-10-20 RX ORDER — METOCLOPRAMIDE HYDROCHLORIDE 5 MG/ML
10 INJECTION INTRAMUSCULAR; INTRAVENOUS ONCE
Status: DISCONTINUED | OUTPATIENT
Start: 2023-10-20 | End: 2023-10-20

## 2023-10-20 RX ADMIN — PROCHLORPERAZINE EDISYLATE 10 MG: 5 INJECTION INTRAMUSCULAR; INTRAVENOUS at 16:47

## 2023-10-20 ASSESSMENT — ACTIVITIES OF DAILY LIVING (ADL)
ADLS_ACUITY_SCORE: 33
ADLS_ACUITY_SCORE: 35
ADLS_ACUITY_SCORE: 33
ADLS_ACUITY_SCORE: 35

## 2023-10-20 NOTE — ED TRIAGE NOTES
Pt arrives with c/o headache for 6 days. Pt endorses sensitivity to light and sound as well for the past 6 days. Pt denies nausea or vomiting.     Triage Assessment (Adult)       Row Name 10/20/23 3572          Triage Assessment    Airway WDL WDL        Respiratory WDL    Respiratory WDL WDL        Skin Circulation/Temperature WDL    Skin Circulation/Temperature WDL WDL        Cardiac WDL    Cardiac WDL WDL        Peripheral/Neurovascular WDL    Peripheral Neurovascular WDL WDL        Cognitive/Neuro/Behavioral WDL    Cognitive/Neuro/Behavioral WDL WDL

## 2023-10-20 NOTE — ED PROVIDER NOTES
History   Chief Complaint:  Headache       HPI:  Grace Jorgensen, anticoagulated on Warfarin, is a very pleasant 73 year old female presenting with a headache that began 6 days ago. The patient reports that she received a COVID vaccination 7 days ago. The next day, she developed a headache located to the front of her head. She also notes photophobia and auditory sensitivity. Today, the patient notes that her vision worsened today and became more blurry. She called her primary care provider who recommended she present to the ED for imaging. The patient states that she took tylenol and oxycodone for her pain which did not help her pain. The patient also notes that she restarted her Warfarin after stopping for 5 days due to bruising on her left arm. She denies neck pain, jaw pain, extremity weakness, numbness, fever, or chills. She also denies any recent falls.         Independent Historian:   None - Patient Only    Review of External Notes:   None.    Medications:    Amlodipine   Lipitor   Celexa   Zestril   Metformin   Coumadin     Past Medical History:    Diabetes Mellitus type II   Dyslipidemia   Hallux vagus   CHF   Gastroparesis   Hiatal hernia   Anemia   Atrial fibrillation   Embolic stroke   Tobacco use disorder   Hyperlipidemia   Anxiety   GERD   RLS   Cerebral infarction   Hypertension   Depression     Past Surgical History:    Mastectomy   Pyelotomy   Hernia repair   Breast augmentation   Mastectomy      Physical Exam   Patient Vitals for the past 24 hrs:   BP Temp Temp src Pulse Resp SpO2   10/20/23 1328 (!) 152/74 97  F (36.1  C) Temporal 92 18 97 %      Physical Exam  Vitals and nursing note reviewed.   Constitutional:       General: She is not in acute distress.     Appearance: Normal appearance. She is not ill-appearing.   HENT:      Head:      Comments: No temporal artery tenderness to palpation bilaterally     Mouth/Throat:      Mouth: Mucous membranes are moist.   Eyes:      Extraocular Movements:  Extraocular movements intact.      Conjunctiva/sclera: Conjunctivae normal.      Pupils: Pupils are equal, round, and reactive to light.      Comments: Right pupil slightly sluggish compared to the left   Cardiovascular:      Rate and Rhythm: Normal rate and regular rhythm.   Pulmonary:      Effort: Pulmonary effort is normal.   Musculoskeletal:      Cervical back: Neck supple. No rigidity.   Neurological:      Mental Status: She is alert and oriented to person, place, and time.      Cranial Nerves: No cranial nerve deficit.      Sensory: No sensory deficit.      Motor: No weakness.           Emergency Department Course   ECG  None performed    Imaging:  CT Head w/o Contrast   Final Result   IMPRESSION:   1.  No CT findings of acute intracranial process.   2.  Multiple small chronic infarcts of the cerebellum, unchanged.   3.  Brain atrophy and presumed chronic small vessel ischemic change, as described.            Report per radiology    Laboratory:  Labs Ordered and Resulted from Time of ED Arrival to Time of ED Departure   ERYTHROCYTE SEDIMENTATION RATE AUTO - Abnormal       Result Value    Erythrocyte Sedimentation Rate 102 (*)    INR - Abnormal    INR 2.40 (*)    CRP INFLAMMATION - Normal    CRP Inflammation <3.00          Procedures   None performed       Emergency Department Course & Assessments:       Interventions:  Medications   prochlorperazine (COMPAZINE) injection 10 mg (10 mg Intravenous $Given 10/20/23 1647)        Assessments:  1537 I obtained history and examined the patient as noted above.     Independent Interpretation (X-rays, CTs, rhythm strip):  I independently interpreted the patient's non-contrast head CT; reassuring against acute intracranial hemorrhage.    Consultations/Discussion of Management or Tests:  I discussed the patient's presentation, assessment and plan with Dr. Melton, Rheumatology.    Social Determinants of Health affecting care:   None.      Disposition:  The patient eloped  from the emergency department.     Impression & Plan   CMS Diagnoses: None       Medical Decision Making:  ED Course as of 10/20/23 2036   Fri Oct 20, 2023   1659 Patient presenting with abnormal headache.  Vital signs are reassuring.  Neurologic exam is unremarkable. Low suspicion for subarachnoid hemorrhage or intracranial hemorrhage given absence of trauma or maximal pain onset.  Low suspicion for giant cell arteritis given no jaw claudication, no temporal tenderness to palpation.  However, with patient's photophobia, will obtain inflammatory markers.  Low suspicion for meningitis or encephalitis without vital sign abnormalities, fever or confusion/neurologic findings.  Plan for CT head to evaluate for hemorrhage or tumor and symptomatic treatment..   1718 INR is therapeutic.  CRP is within normal limits, reassuring against giant cell arteritis.   1919 CT head reassuring against acute intracranial hemorrhage.  No evidence of mass.  While patient's CRP is within normal limits, the patient's ESR is significantly elevated greater than 100.  With patient's age, new onset of headache, complaints of vision changes, concern for giant cell arteritis.  Will consult with rheumatology.   1946 Discussing with rheumatology, I learned that the patient had eloped.  Rheumatology fellow did express concern that this could be giant cell arteritis.  Would recommend  mg Solu-Medrol for 3 days given ocular involvement, obtaining basic labs, temporal artery biopsy by ophthalmology or vascular surgery.     I did manage to reach the patient.  Additional questions: She denied any hip or shoulder pain recently.  She states that her vision is just not as clear as normal, for example when she is watching TV.  Her headache is persisting.  She eloped because she became frustrated about the length of her stay in the emergency department.  She will plan to return to the emergency department this evening for continued evaluation.  I did  order CBC and CMP based on early lab draws.    Rheumatology: recommend inpatient with IV solumedrol 500 mg for 3 days     Critical Care time was 0 minutes for this patient excluding procedures.       Diagnosis:    ICD-10-CM    1. Acute intractable headache, unspecified headache type  R51.9 CBC with Platelets & Differential     CANCELED: Basic metabolic panel         Scribe Disclosure:  I, Ewa Almanzar, am serving as a scribe at 4:49 PM on 10/20/2023 to document services personally performed by Jan Covarrubias MD based on my observations and the provider's statements to me.       Jan Covarrubias MD  10/20/23 2036

## 2023-10-21 LAB
ANION GAP SERPL CALCULATED.3IONS-SCNC: 12 MMOL/L (ref 7–15)
BUN SERPL-MCNC: 14.1 MG/DL (ref 8–23)
CALCIUM SERPL-MCNC: 9.2 MG/DL (ref 8.8–10.2)
CHLORIDE SERPL-SCNC: 102 MMOL/L (ref 98–107)
CREAT SERPL-MCNC: 0.86 MG/DL (ref 0.51–0.95)
DEPRECATED HCO3 PLAS-SCNC: 20 MMOL/L (ref 22–29)
EGFRCR SERPLBLD CKD-EPI 2021: 70 ML/MIN/1.73M2
ERYTHROCYTE [DISTWIDTH] IN BLOOD BY AUTOMATED COUNT: 19.3 % (ref 10–15)
GLUCOSE BLDC GLUCOMTR-MCNC: 133 MG/DL (ref 70–99)
GLUCOSE BLDC GLUCOMTR-MCNC: 147 MG/DL (ref 70–99)
GLUCOSE BLDC GLUCOMTR-MCNC: 151 MG/DL (ref 70–99)
GLUCOSE BLDC GLUCOMTR-MCNC: 153 MG/DL (ref 70–99)
GLUCOSE BLDC GLUCOMTR-MCNC: 171 MG/DL (ref 70–99)
GLUCOSE SERPL-MCNC: 188 MG/DL (ref 70–99)
HBA1C MFR BLD: 6.2 %
HCT VFR BLD AUTO: 30.8 % (ref 35–47)
HGB BLD-MCNC: 9.6 G/DL (ref 11.7–15.7)
INR PPP: 2.2 (ref 0.85–1.15)
MAGNESIUM SERPL-MCNC: 1.7 MG/DL (ref 1.7–2.3)
MAGNESIUM SERPL-MCNC: 2 MG/DL (ref 1.7–2.3)
MCH RBC QN AUTO: 25.5 PG (ref 26.5–33)
MCHC RBC AUTO-ENTMCNC: 31.2 G/DL (ref 31.5–36.5)
MCV RBC AUTO: 82 FL (ref 78–100)
PLATELET # BLD AUTO: 390 10E3/UL (ref 150–450)
POTASSIUM SERPL-SCNC: 4.1 MMOL/L (ref 3.4–5.3)
RBC # BLD AUTO: 3.76 10E6/UL (ref 3.8–5.2)
SODIUM SERPL-SCNC: 134 MMOL/L (ref 135–145)
WBC # BLD AUTO: 5.9 10E3/UL (ref 4–11)

## 2023-10-21 PROCEDURE — 99232 SBSQ HOSP IP/OBS MODERATE 35: CPT | Performed by: INTERNAL MEDICINE

## 2023-10-21 PROCEDURE — 85610 PROTHROMBIN TIME: CPT | Performed by: INTERNAL MEDICINE

## 2023-10-21 PROCEDURE — 258N000003 HC RX IP 258 OP 636: Performed by: EMERGENCY MEDICINE

## 2023-10-21 PROCEDURE — 250N000011 HC RX IP 250 OP 636: Performed by: INTERNAL MEDICINE

## 2023-10-21 PROCEDURE — 85027 COMPLETE CBC AUTOMATED: CPT | Performed by: INTERNAL MEDICINE

## 2023-10-21 PROCEDURE — 80048 BASIC METABOLIC PNL TOTAL CA: CPT | Performed by: INTERNAL MEDICINE

## 2023-10-21 PROCEDURE — 120N000001 HC R&B MED SURG/OB

## 2023-10-21 PROCEDURE — 99222 1ST HOSP IP/OBS MODERATE 55: CPT | Performed by: SURGERY

## 2023-10-21 PROCEDURE — 83735 ASSAY OF MAGNESIUM: CPT | Performed by: INTERNAL MEDICINE

## 2023-10-21 PROCEDURE — 250N000013 HC RX MED GY IP 250 OP 250 PS 637: Performed by: INTERNAL MEDICINE

## 2023-10-21 PROCEDURE — 36415 COLL VENOUS BLD VENIPUNCTURE: CPT | Performed by: INTERNAL MEDICINE

## 2023-10-21 PROCEDURE — 250N000011 HC RX IP 250 OP 636: Performed by: EMERGENCY MEDICINE

## 2023-10-21 RX ORDER — LISINOPRIL 40 MG/1
40 TABLET ORAL DAILY
Status: DISCONTINUED | OUTPATIENT
Start: 2023-10-21 | End: 2023-10-25 | Stop reason: HOSPADM

## 2023-10-21 RX ORDER — NALOXONE HYDROCHLORIDE 0.4 MG/ML
0.4 INJECTION, SOLUTION INTRAMUSCULAR; INTRAVENOUS; SUBCUTANEOUS
Status: DISCONTINUED | OUTPATIENT
Start: 2023-10-21 | End: 2023-10-25 | Stop reason: HOSPADM

## 2023-10-21 RX ORDER — PANTOPRAZOLE SODIUM 40 MG/1
40 TABLET, DELAYED RELEASE ORAL
Status: DISCONTINUED | OUTPATIENT
Start: 2023-10-21 | End: 2023-10-25 | Stop reason: HOSPADM

## 2023-10-21 RX ORDER — OXYCODONE HYDROCHLORIDE 5 MG/1
5 TABLET ORAL EVERY 6 HOURS PRN
Status: DISCONTINUED | OUTPATIENT
Start: 2023-10-21 | End: 2023-10-22

## 2023-10-21 RX ORDER — NALOXONE HYDROCHLORIDE 0.4 MG/ML
0.2 INJECTION, SOLUTION INTRAMUSCULAR; INTRAVENOUS; SUBCUTANEOUS
Status: DISCONTINUED | OUTPATIENT
Start: 2023-10-21 | End: 2023-10-25 | Stop reason: HOSPADM

## 2023-10-21 RX ORDER — NICOTINE POLACRILEX 4 MG
15-30 LOZENGE BUCCAL
Status: DISCONTINUED | OUTPATIENT
Start: 2023-10-21 | End: 2023-10-25 | Stop reason: HOSPADM

## 2023-10-21 RX ORDER — ATORVASTATIN CALCIUM 20 MG/1
20 TABLET, FILM COATED ORAL DAILY
Status: DISCONTINUED | OUTPATIENT
Start: 2023-10-21 | End: 2023-10-25 | Stop reason: HOSPADM

## 2023-10-21 RX ORDER — NICOTINE 21 MG/24HR
1 PATCH, TRANSDERMAL 24 HOURS TRANSDERMAL EVERY 24 HOURS
Status: DISCONTINUED | OUTPATIENT
Start: 2023-10-21 | End: 2023-10-25 | Stop reason: HOSPADM

## 2023-10-21 RX ORDER — DEXTROSE MONOHYDRATE 25 G/50ML
25-50 INJECTION, SOLUTION INTRAVENOUS
Status: DISCONTINUED | OUTPATIENT
Start: 2023-10-21 | End: 2023-10-25 | Stop reason: HOSPADM

## 2023-10-21 RX ORDER — AMLODIPINE BESYLATE 10 MG/1
10 TABLET ORAL DAILY
Status: DISCONTINUED | OUTPATIENT
Start: 2023-10-21 | End: 2023-10-25 | Stop reason: HOSPADM

## 2023-10-21 RX ORDER — PANTOPRAZOLE SODIUM 40 MG/1
40 TABLET, DELAYED RELEASE ORAL 2 TIMES DAILY
Status: DISCONTINUED | OUTPATIENT
Start: 2023-10-21 | End: 2023-10-21

## 2023-10-21 RX ADMIN — Medication 5 MG: at 22:56

## 2023-10-21 RX ADMIN — PANTOPRAZOLE SODIUM 40 MG: 40 TABLET, DELAYED RELEASE ORAL at 05:45

## 2023-10-21 RX ADMIN — OXYCODONE HYDROCHLORIDE 5 MG: 5 TABLET ORAL at 16:26

## 2023-10-21 RX ADMIN — SODIUM CHLORIDE 500 MG: 9 INJECTION, SOLUTION INTRAVENOUS at 00:26

## 2023-10-21 RX ADMIN — ATORVASTATIN CALCIUM 20 MG: 20 TABLET, FILM COATED ORAL at 08:46

## 2023-10-21 RX ADMIN — PROCHLORPERAZINE EDISYLATE 5 MG: 5 INJECTION INTRAMUSCULAR; INTRAVENOUS at 11:50

## 2023-10-21 RX ADMIN — HYDROMORPHONE HYDROCHLORIDE 0.3 MG: 1 INJECTION, SOLUTION INTRAMUSCULAR; INTRAVENOUS; SUBCUTANEOUS at 00:19

## 2023-10-21 RX ADMIN — AMLODIPINE BESYLATE 10 MG: 10 TABLET ORAL at 08:46

## 2023-10-21 RX ADMIN — PANTOPRAZOLE SODIUM 40 MG: 40 TABLET, DELAYED RELEASE ORAL at 16:21

## 2023-10-21 RX ADMIN — OXYCODONE HYDROCHLORIDE 5 MG: 5 TABLET ORAL at 22:56

## 2023-10-21 RX ADMIN — LISINOPRIL 40 MG: 40 TABLET ORAL at 08:46

## 2023-10-21 RX ADMIN — NICOTINE 1 PATCH: 21 PATCH, EXTENDED RELEASE TRANSDERMAL at 18:09

## 2023-10-21 RX ADMIN — Medication 5 MG: at 02:37

## 2023-10-21 ASSESSMENT — ACTIVITIES OF DAILY LIVING (ADL)
ADLS_ACUITY_SCORE: 32
ADLS_ACUITY_SCORE: 32
ADLS_ACUITY_SCORE: 31
ADLS_ACUITY_SCORE: 32
ADLS_ACUITY_SCORE: 35
ADLS_ACUITY_SCORE: 32
ADLS_ACUITY_SCORE: 31
ADLS_ACUITY_SCORE: 32
ADLS_ACUITY_SCORE: 32

## 2023-10-21 NOTE — H&P
[facility]    Hospitalist History and Physical    Name: Grace Jorgensen    MRN: [id number]  YOB: 1950    Age: 73 year old  Date of Admission:  10/20/2023  Date of Service (when I saw the patient): 10/20/23    Assessment & Plan   Grace Jorgensen is a 73 year old female with past medical history significant for hypertension, type 2 diabetes mellitus, paroxysmal atrial fibrillation on Coumadin, prior stroke, chronic dizziness presented to the emergency room with 6 days history of severe headache and blurry vision.  Lab work in the emergency room showed ESR of more than 100.  There is concern for temporal arteritis patient is being admitted for IV steroids and possible biopsy.      Possible temporal arteritis  Headache  Visual changes  Elevated ESR  -CT head in the ED shows no acute intracranial process.  -ED provider contacted rheumatologist on-call and was advised to start Solu-Medrol  mg for 3 days and to get temporal artery biopsy  -Started on IV steroids  -Consult general surgery for temporal artery biopsy     Type 2 diabetes mellitus with history of polyneuropathy  -Prior to admission patient on metformin  -We will place patient on sliding scale given that patient will be on steroids we will need to closely monitor blood glucose  -Check hemoglobin A1c  -Diabetic diet  -Prior records show patient was on gabapentin (not on the med list) will need to review reorder meds once reconciled    Paroxysmal atrial fibrillation  -Not on any medication to control rate per med list  -Heart rate is in 90s  -On warfarin at baseline holding for possible biopsy.  Can be resumed after biopsy    Hypertension  -Prior to admission patient on lisinopril and amlodipine we will continue    Hyperlipidemia  -Continue on statins    GERD  -Continue PPI now that patient is also on steroids    Depression  -Continue prior to admission meds    Tobacco use disorder  -Ordered nicotine patch    We  will need to  review and reorder meds once reconciled in a.m.      Clinically Significant Risk Factors Present on Admission               # Drug Induced Coagulation Defect: home medication list includes an anticoagulant medication    # Hypertension: Noted on problem list                   DVT Prophylaxis: Warfarin  Code Status: Full Code    Disposition: Admitted as inpatient  Primary Care Physician   [facility]    Chief Complaint   Headache for 6 days    History is obtained from the patient    History of Present Illness   Grace Jorgensen is a 73 year old female who presents with past medical history significant for hypertension, type 2 diabetes mellitus, paroxysmal atrial fibrillation on Coumadin, prior stroke, chronic dizziness presented to the emergency room with 6 days history of severe headache and blurry vision.      Describes severe headache worst headache of her life localized on the frontal aspect of her forehead all across the forehead and at the back of the neck in a bandlike fashion.  Constant 9 out of 10 in intensity associated with blurry vision.  No associated nausea.  Has some photophobia, no pain with extraocular movement.  No runny nose no nasal congestion no tinnitus no new focal neurologic weakness no fever or chills.  Has chronic neck pain and shoulder pain due to prior injuries and arthritis.  Complains of generalized malaise and weakness feels weak.  Headache is so bad that she start restarted smoking.  Nothing seems to be helping.  Tried pain medication with no relief.  More than 10 point review of systems was carried out was otherwise negative.  She notes that she was off her warfarin for 5 days due to some bruising noted that she noticed in her left arm.    Lab work in the emergency room showed ESR of more than 100.  There is concern for temporal arteritis patient is being admitted for IV steroids and possible biopsy.      Past Medical History    No past medical  history on file.  Diabetes mellitus, hypertension, hyperlipidemia, paroxysmal atrial fibrillation, history of CVAs, depression, GERD    Past Surgical History   No past surgical history on file.    Prior to Admission Medications   Prior to Admission Medications   Prescriptions Last Dose Informant Patient Reported? Taking?   amLODIPine (NORVASC) 5 MG tablet  Self Yes No   Sig: Take 10 mg by mouth daily   atorvastatin (LIPITOR) 20 MG tablet  Self Yes No   Sig: Take 20 mg by mouth daily   citalopram (CELEXA) 40 MG tablet  Self Yes No   Sig: Take 40 mg by mouth daily   ketoconazole (NIZORAL) 2 % external shampoo  Self Yes No   Sig: Apply topically daily as needed for itching or irritation   lisinopril (ZESTRIL) 40 MG tablet  Self Yes No   Sig: Take 40 mg by mouth daily   metFORMIN (GLUCOPHAGE) 500 MG tablet  Self Yes No   Sig: Take 500 mg by mouth daily (with breakfast)   oxyCODONE (ROXICODONE) 5 MG tablet   No No   Sig: Take 1 tablet (5 mg) by mouth every 6 hours as needed for severe pain   pantoprazole (PROTONIX) 20 MG EC tablet  Self Yes No   Sig: Take 40 mg by mouth 2 times daily   warfarin ANTICOAGULANT (COUMADIN) 5 MG tablet  Self Yes No   Sig: Take 5 mg by mouth Every Monday, Wednesday, and Friday   warfarin ANTICOAGULANT (COUMADIN) 5 MG tablet  Self Yes No   Sig: Take 10 mg by mouth daily Sunday, Tuesday, Thursday, Saturday      Facility-Administered Medications: None     Allergies   Allergies   Allergen Reactions    Bupropion GI Disturbance       Social History   Social History     Tobacco Use    Smoking status: Not on file    Smokeless tobacco: Not on file   Substance Use Topics    Alcohol use: Not on file     Social History     Social History Narrative    Not on file   Smokes a pack per day is requiring nicotine patch  Denies alcohol use        Family History   I have reviewed this patient's family history and updated it with pertinent information if needed.   No family history on file.  Both parents had  "coronary artery disease.  History significant for diabetes    Review of Systems   A Comprehensive greater than 10 system review of systems was carried out.  Pertinent positives and negatives are noted above.  Otherwise negative for contributory information.    Physical Exam   Temp: 97.5  F (36.4  C) Temp src: Temporal BP: (!) 168/73 (screaming during BP check.) Pulse: 99   Resp: 18 SpO2: 96 % O2 Device: None (Room air)    Vital Signs with Ranges  Temp:  [97  F (36.1  C)-97.5  F (36.4  C)] 97.5  F (36.4  C)  Pulse:  [92-99] 99  Resp:  [18] 18  BP: (152-168)/(73-74) 168/73  SpO2:  [96 %-97 %] 96 %  0 lbs 0 oz    GEN:  Alert, oriented x 3, appears comfortable, no overt distress  HEENT:  Normocephalic/atraumatic, no scleral icterus, no nasal discharge, mouth dry  CV:  Regular rate and rhythm, no murmur or JVD.  S1 + S2 noted, no S3 or S4.  LUNGS:  Clear to auscultation bilaterally without rales/rhonchi/wheezing/retractions.  Symmetric chest rise on inhalation noted.  ABD:  Active bowel sounds, soft, non-tender/non-distended.  No rebound/guarding/rigidity.  EXT:  No edema.  No cyanosis.  No joint synovitis noted.  SKIN:  Dry to touch, no exanthems noted in the visualized areas.  NEURO: Awake alert oriented x3, cranial nerves intact, moving all extremities did not assess for gait.  No new focal deficits appreciated.    Data   Data reviewed today:  I personally reviewed the head CT image(s) showing no acute bleeding .    No results for input(s): \"PH\", \"PHV\", \"PO2\", \"PO2V\", \"SAT\", \"PCO2\", \"PCO2V\", \"HCO3\", \"HCO3V\" in the last 168 hours.  Recent Labs   Lab 10/20/23  1642   WBC 7.9   HGB 9.7*   HCT 32.1*   MCV 84        Recent Labs   Lab 10/20/23  1642      POTASSIUM 4.8   CHLORIDE 102   CO2 21*   ANIONGAP 12   *   BUN 13.9   CR 0.78   GFRESTIMATED 79   SERA 9.7     Recent Labs   Lab 10/20/23  1642   *     Recent Labs   Lab 10/20/23  1642   AST 23   ALT 14   ALKPHOS 86   BILITOTAL <0.2     Recent Labs " "  Lab 10/20/23  1642   INR 2.40*     No results for input(s): \"LACT\" in the last 168 hours.  No results for input(s): \"LIPASE\" in the last 168 hours.  No results for input(s): \"CHOL\", \"HDL\", \"LDL\", \"TRIG\", \"CHOLHDLRATIO\" in the last 168 hours.  No results for input(s): \"TSH\" in the last 168 hours.  No results for input(s): \"TROPONIN\", \"TROPI\", \"TROPR\", \"TROPONINIS\" in the last 168 hours.    Invalid input(s): \"TROPT\", \"TROP\", \"TROPONINIES\", \"TNIH\"  No results for input(s): \"COLOR\", \"APPEARANCE\", \"URINEGLC\", \"URINEBILI\", \"URINEKETONE\", \"SG\", \"UBLD\", \"URINEPH\", \"PROTEIN\", \"UROBILINOGEN\", \"NITRITE\", \"LEUKEST\", \"RBCU\", \"WBCU\" in the last 168 hours.    Recent Results (from the past 24 hour(s))   CT Head w/o Contrast    Narrative    EXAM: CT HEAD W/O CONTRAST  LOCATION: [facility]  DATE: 10/20/2023    INDICATION: Abnormal headache.    COMPARISON: CT of the head dated 11/9/2022.    TECHNIQUE: Routine CT head without IV contrast. Multiplanar reformats. Dose reduction techniques were used.    FINDINGS:  INTRACRANIAL CONTENTS: There are multiple small chronic infarcts in the right greater than left cerebellar hemispheres, unchanged. Mild to moderate generalized brain parenchymal volume loss. Mild to moderate patchy nonspecific hypoattenuation in the   cerebral white matter, likely due to chronic small vessel ischemic disease. No CT findings of large transcortical acute or subacute infarct. No acute intracranial hemorrhage. Scattered intracranial atherosclerotic calcifications are present.    VISUALIZED ORBITS/SINUSES/MASTOIDS: Prior bilateral cataract surgery. Visualized portions of the orbits are otherwise unremarkable. Mild mucosal thickening noted in the right sphenoid sinus. The visualized paranasal sinuses otherwise appear clear. No   middle ear or mastoid effusion.    BONES/SOFT TISSUES: No acute abnormality.      Impression    IMPRESSION:  1.  No CT findings of acute intracranial process.  2.  " Multiple small chronic infarcts of the cerebellum, unchanged.  3.  Brain atrophy and presumed chronic small vessel ischemic change, as described.

## 2023-10-21 NOTE — ED PROVIDER NOTES
"    History     Chief Complaint:  Headache       HPI   Grace Jorgensen is a 73 year old female with a history of A-fib on warfarin who presents with a headache that began gradually 6 days ago and has continued to worsen.  Started shortly after she had her COVID vaccination.  The headache is located frontally.  She has tried taking oxycodone at home without relief.  She has had some photophobia as well.  She has noticed that her vision has become slightly blurry today as well.  She was seen here earlier in the day and had a negative head CT.  She did have lab work that showed a ESR greater than 100.  Dr. Covarrubias discussed with rheumatology and they recommended admission for IV steroids.  However the patient had eloped prior to this decision.  Dr. Covarrubias was able to get a hold of the patient and recommend that she come back to the ER.      Independent Historian:    None-patient only    Review of External Notes:  ER note from earlier today.  Also reviewed nurse triage note from 2 days ago discussing her headache and the recommendation to come to the ER for evaluation.    Medications:    amLODIPine (NORVASC) 5 MG tablet  atorvastatin (LIPITOR) 20 MG tablet  citalopram (CELEXA) 40 MG tablet  ketoconazole (NIZORAL) 2 % external shampoo  lisinopril (ZESTRIL) 40 MG tablet  metFORMIN (GLUCOPHAGE) 500 MG tablet  oxyCODONE (ROXICODONE) 5 MG tablet  pantoprazole (PROTONIX) 20 MG EC tablet  warfarin ANTICOAGULANT (COUMADIN) 5 MG tablet  warfarin ANTICOAGULANT (COUMADIN) 5 MG tablet        Past Medical History:    Diabetes  Gastroparesis  Stroke  A-fib  Hyponatremia  Hyperlipidemia  Hypertension        Physical Exam   Patient Vitals for the past 24 hrs:   BP Temp Temp src Pulse Resp SpO2 Height Weight   10/21/23 0106 (!) 163/71 98.2  F (36.8  C) Oral 92 16 95 % -- --   10/21/23 0056 -- -- -- -- -- -- 1.626 m (5' 4\") 78.3 kg (172 lb 9.6 oz)   10/20/23 2049 (!) 168/73 97.5  F (36.4  C) Temporal 99 18 96 % -- --    "     Physical Exam  Vitals and nursing note reviewed.   Constitutional:       General: She is not in acute distress.     Appearance: She is not ill-appearing.   HENT:      Head: Normocephalic and atraumatic.      Comments: No temporal tenderness noted     Right Ear: External ear normal.      Left Ear: External ear normal.      Nose: Nose normal.      Mouth/Throat:      Mouth: Mucous membranes are moist.   Eyes:      Extraocular Movements: Extraocular movements intact.      Conjunctiva/sclera: Conjunctivae normal.      Pupils: Pupils are equal, round, and reactive to light.   Cardiovascular:      Rate and Rhythm: Normal rate and regular rhythm.      Heart sounds: No murmur heard.  Pulmonary:      Effort: Pulmonary effort is normal. No respiratory distress.      Breath sounds: Normal breath sounds. No wheezing, rhonchi or rales.   Abdominal:      General: Abdomen is flat. Bowel sounds are normal. There is no distension.      Palpations: Abdomen is soft.      Tenderness: There is no abdominal tenderness. There is no guarding or rebound.   Musculoskeletal:         General: No deformity or signs of injury.      Cervical back: Normal range of motion and neck supple. No rigidity.   Skin:     General: Skin is warm and dry.      Findings: No rash.   Neurological:      Mental Status: She is alert and oriented to person, place, and time.      Cranial Nerves: No cranial nerve deficit.      Sensory: No sensory deficit.      Motor: No weakness.   Psychiatric:         Behavior: Behavior normal.           Emergency Department Course       Emergency Department Course & Assessments:             Interventions:  Medications   HYDROmorphone (PF) (DILAUDID) injection 0.3 mg (0.3 mg Intravenous $Given 10/21/23 0019)   amLODIPine (NORVASC) tablet 10 mg (has no administration in time range)   atorvastatin (LIPITOR) tablet 20 mg (has no administration in time range)   lisinopril (ZESTRIL) tablet 40 mg (has no administration in time range)    oxyCODONE (ROXICODONE) tablet 5 mg (has no administration in time range)   pantoprazole (PROTONIX) EC tablet 40 mg (40 mg Oral Not Given 10/21/23 0221)   methylPREDNISolone sodium succinate (solu-MEDROL) 500 mg in sodium chloride 0.9 % 114 mL intermittent infusion (has no administration in time range)   glucose gel 15-30 g (has no administration in time range)     Or   dextrose 50 % injection 25-50 mL (has no administration in time range)     Or   glucagon injection 1 mg (has no administration in time range)   Patient is already receiving anticoagulation with heparin, enoxaparin (LOVENOX), warfarin (COUMADIN)  or other anticoagulant medication (has no administration in time range)   insulin aspart (NovoLOG) injection (RAPID ACTING) (has no administration in time range)   insulin aspart (NovoLOG) injection (RAPID ACTING) ( Subcutaneous Not Given 10/21/23 0130)   nicotine Patch in Place (2 each Transdermal Patch in Place 10/21/23 0113)   nicotine (NICODERM CQ) 21 MG/24HR 24 hr patch 1 patch (1 patch Transdermal Patch in Place 10/21/23 0113)   naloxone (NARCAN) injection 0.2 mg (has no administration in time range)     Or   naloxone (NARCAN) injection 0.4 mg (has no administration in time range)     Or   naloxone (NARCAN) injection 0.2 mg (has no administration in time range)     Or   naloxone (NARCAN) injection 0.4 mg (has no administration in time range)   melatonin tablet 5 mg (has no administration in time range)   methylPREDNISolone sodium succinate (solu-MEDROL) 500 mg in sodium chloride 0.9 % 114 mL intermittent infusion (500 mg Intravenous $New Bag 10/21/23 0026)        Independent Interpretation (X-rays, CTs, rhythm strip):  None    Consultations/Discussion of Management or Tests:  7029 I discussed with Dr. Campuzano       Social Determinants of Health affecting care:  None     Disposition:  The patient was admitted to the hospital under the care of Dr. Campuzano.     Impression & Plan    CMS Diagnoses:  None    Medical Decision Makin-year-old female presenting with headache.  Was seen earlier today and had work-up significant for an elevated ESR greater than 100.  There was concern about possible temporal arteritis.  Plan was to admit the patient for IV steroids after the previous physician had talked to rheumatology and that was the recommendation.  However the patient had eloped.  Patient is now back without any change in her symptoms.  We will place an IV and give 500 mg of IV Solu-Medrol as recommended by rheumatology at previous visit.  I discussed with hospitalist who accepted admission for further care.  I reviewed the chart from earlier today and the patient did not have any signs of any intracranial bleed on her head CT.  Lab work was otherwise unremarkable.  No fevers or meningismus to suggest a CNS infection.  No neurologic deficits to suggest a new stroke.      Diagnosis:    ICD-10-CM    1. Acute intractable headache, unspecified headache type  R51.9       2. Temporal arteritis (H)  M31.6            Discharge Medications:  Current Discharge Medication List               10/20/2023   Nadeem Redding MD Goodwin, Shaun M, MD  10/21/23 0234

## 2023-10-21 NOTE — PROVIDER NOTIFICATION
MD paged: Pt requesting order for PRN Benadryl for sleep. Can you place order? Thanks.     MD states Benadryl not a safe sleep aid as it interacts with many medications. PRN 5 mg melatonin ordered.

## 2023-10-21 NOTE — PLAN OF CARE
"Goal Outcome Evaluation:      Plan of Care Reviewed With: patient    Overall Patient Progress: no changeOverall Patient Progress: no change     BP (!) 163/71 (BP Location: Left arm, Patient Position: Right side, Cuff Size: Adult Regular)   Pulse 92   Temp 98.2  F (36.8  C) (Oral)   Resp 16   Ht 1.626 m (5' 4\")   Wt 78.3 kg (172 lb 9.6 oz)   SpO2 95%   BMI 29.63 kg/m      VS: VSS on RA except BP elevated at times.   Pain: Reports pain in head. Declined pain mediation.   Lines: PIV SL to R wrist, but around 0600, IV pulled. Attempted linen change and gown change. Pt declined IV access at this time.   Cognitive: Aox4. Pt frustrated as she was unable to take her scheduled night time meds. RN educated pt on how an order is needed for her medications and RN added sticky note. Pt still frustrated as she cannot get her meds. All meds sent down to pharmacy.   Respiratory: LS: expiratory wheezes. HEARN reported.  Cardiac: Denies CP.   Peripheral neurovascular: No edema, numbness, tingling, or tenderness. Pulses 2+.  GI:  Denies N/V.  : Voiding spontaneously.   Skin: See flowsheet for assessment.   Diet: No diet order in place.  Activity: A1 GB and walker.   Labs: K and Mg in for AM draw. B.  Plan: IV steroids. Pt would like to speak to MD today about her medications. Continue w/ POC.         "

## 2023-10-21 NOTE — PROGRESS NOTES
Hospitalist Medicine Progress Note   [facility]       Grace Jorgensen is a 73 year old lady with HTN, T2DM, PAF paroxysmal atrial fibrillation on warfarin therapy, stroke, chronic dizziness came to [facility] 10/20/2023 with history of severe headache associated with blurred vision x 6 days        Date of Admission:  10/20/2023  Assessment & Plan     Possible temporal arteritis  Headache  Visual changes  Elevated ESR  -CT head in the ED shows no acute intracranial process.  -ED provider contacted rheumatologist on-call and was advised to start Solu-Medrol  mg for 3 days and to get temporal artery biopsy  -Started on IV steroids  -Consult general surgery for temporal artery biopsy      Type 2 diabetes mellitus with history of polyneuropathy  -Prior to admission patient on metformin  -We will place patient on sliding scale given that patient will be on steroids we will need to closely monitor blood glucose  -Check hemoglobin A1c  -Diabetic diet  -Prior records show patient was on gabapentin (not on the med list) will need to review reorder meds once reconciled     Paroxysmal atrial fibrillation  -Not on any medication to control rate per med list  -Heart rate is in 90s  -On warfarin at baseline holding for possible biopsy.  Can be resumed after biopsy     Hypertension  -Prior to admission patient on lisinopril and amlodipine we will continue  Blood pressure is somewhat elevated on treatment with IV steroids high-dose     Hyperlipidemia  -Continue on statins     GERD  -Continue PPI now that patient is also on steroids     Depression  -Continue prior to admission meds     Tobacco use disorder  -Ordered nicotine patch            Plan:   Discussed with surgery -INR is still 2.2 so biopsy will not be done at this time  Will discuss with ophthalmology and the patient states that the blurring of vision is improving  Monitor blood pressure      Diet:  Diabetic diet  DVT  Prophylaxis: Warfarin  Waterman Catheter: Not present  Code Status: Full Code               The patient's care was discussed with the Patient and general surgery.    Ramses Preston MD  Hospitalist Service  [facility]    ______________________________________________________________________    Interval History     Symptoms   Patient states that the headache continues to be severe though better than before it is 8/10  She says her blurring of vision is improving but not completely    Review of Systems:   Denies any nausea or vomiting    Data reviewed today: I reviewed all medications, new labs and imaging results over the last 24 hours.     Physical Exam   Vital Signs: Temp: 98.6  F (37  C) Temp src: Oral BP: (!) 163/89 Pulse: 97   Resp: 20 SpO2: 96 % O2 Device: None (Room air)    Weight: 172 lbs 9.6 oz      GENERAL: Patient is not in acute distress  HEENT: EOM+,Conjunctiva is clear   NECK:  no Jugular Venous distention  HEART: S1 S2 regular Rate and Rhythm, there is  no murmur,   LUNGS: Respirations are  not laboured, Lungs are  clear to auscultation without Crepitations or Wheezing   ABDOMEN: Soft , there is no tenderness ,Bowel Sounds are  Positive   LOWER LIMBS: no  Pedal Edema  Bilaterally   CNS:  Alert,  Oriented x 3, Moving all the Four Limbs     Data   Recent Labs   Lab 10/21/23  0820 10/21/23  0644 10/21/23  0129 10/20/23  1642   WBC  --  5.9  --  7.9   HGB  --  9.6*  --  9.7*   MCV  --  82  --  84   PLT  --  390  --  429   INR  --  2.20*  --  2.40*   NA  --  134*  --  135   POTASSIUM  --  4.1  --  4.8   CHLORIDE  --  102  --  102   CO2  --  20*  --  21*   BUN  --  14.1  --  13.9   CR  --  0.86  --  0.78   ANIONGAP  --  12  --  12   SERA  --  9.2  --  9.7   * 188* 151* 105*   ALBUMIN  --   --   --  3.9   PROTTOTAL  --   --   --  7.6   BILITOTAL  --   --   --  <0.2   ALKPHOS  --   --   --  86   ALT  --   --   --  14   AST  --   --   --  23         Recent Results (from the past 24  hour(s))   CT Head w/o Contrast    Narrative    EXAM: CT HEAD W/O CONTRAST  LOCATION: [facility]  DATE: 10/20/2023    INDICATION: Abnormal headache.    COMPARISON: CT of the head dated 11/9/2022.    TECHNIQUE: Routine CT head without IV contrast. Multiplanar reformats. Dose reduction techniques were used.    FINDINGS:  INTRACRANIAL CONTENTS: There are multiple small chronic infarcts in the right greater than left cerebellar hemispheres, unchanged. Mild to moderate generalized brain parenchymal volume loss. Mild to moderate patchy nonspecific hypoattenuation in the   cerebral white matter, likely due to chronic small vessel ischemic disease. No CT findings of large transcortical acute or subacute infarct. No acute intracranial hemorrhage. Scattered intracranial atherosclerotic calcifications are present.    VISUALIZED ORBITS/SINUSES/MASTOIDS: Prior bilateral cataract surgery. Visualized portions of the orbits are otherwise unremarkable. Mild mucosal thickening noted in the right sphenoid sinus. The visualized paranasal sinuses otherwise appear clear. No   middle ear or mastoid effusion.    BONES/SOFT TISSUES: No acute abnormality.      Impression    IMPRESSION:  1.  No CT findings of acute intracranial process.  2.  Multiple small chronic infarcts of the cerebellum, unchanged.  3.  Brain atrophy and presumed chronic small vessel ischemic change, as described.

## 2023-10-21 NOTE — ED NOTES
[facility]  ED Nurse Handoff Report    ED Chief complaint: Headache  . ED Diagnosis:   Final diagnoses:   Acute intractable headache, unspecified headache type   Temporal arteritis (H)       Allergies:   Allergies   Allergen Reactions    Bupropion GI Disturbance       Code Status: Full Code    Activity level - Baseline/Home:  independent.  Activity Level - Current:   independent.   Lift room needed: No.   Bariatric: No   Needed: No   Isolation: No.   Infection: Not Applicable.     Respiratory status: Room air    Vital Signs (within 30 minutes):   Vitals:    10/20/23 2049   BP: (!) 168/73   Pulse: 99   Resp: 18   Temp: 97.5  F (36.4  C)   TempSrc: Temporal   SpO2: 96%       Cardiac Rhythm:  ,      Pain level:    Patient confused: No.   Patient Falls Risk: nonskid shoes/slippers when out of bed, arm band in place, and patient and family education.   Elimination Status: Has voided     Patient Report - Initial Complaint: headache.   Focused Assessment: Grace Jorgensen, anticoagulated on Warfarin, is a v 73 year old female presenting with a headache that began 6 days ago. The patient reports that she received a COVID vaccination 7 days ago. The next day, she developed a headache located to the front of her head. She also notes photophobia and auditory sensitivity. Today, the patient notes that her vision worsened today and became more blurry. She called her primary care provider who recommended she present to the ED for imaging. The patient states that she took tylenol and oxycodone for her pain which did not help her pain. The patient also notes that she restarted her Warfarin after stopping for 5 days due to bruising on her left arm. She denies neck pain, jaw pain, extremity weakness, numbness, fever, or chills. She also denies any recent falls.  Patient initally eloped r/t long wait times.  Imaging showed concern for GCA and recommended inpatient stay for IV steroids.    Abnormal  Results:   Labs Ordered and Resulted from Time of ED Arrival to Time of ED Departure - No data to display     No orders to display       Treatments provided: meds  Family Comments: friend at bedside.  OBS brochure/video discussed/provided to patient:  No  ED Medications:   Medications   methylPREDNISolone sodium succinate (solu-MEDROL) 500 mg in sodium chloride 0.9 % 114 mL intermittent infusion (has no administration in time range)   HYDROmorphone (PF) (DILAUDID) injection 0.3 mg (has no administration in time range)       Drips infusing:  Yes  For the majority of the shift this patient was Green.   Interventions performed were None.    Sepsis treatment initiated: No    Cares/treatment/interventions/medications to be completed following ED care: see orders    ED Nurse Name: Erica Becerril RN  12:10 AM     RECEIVING UNIT ED HANDOFF REVIEW    Above ED Nurse Handoff Report was reviewed: Yes  Reviewed by: Camille Pineda RN on October 21, 2023 at 12:16 AM

## 2023-10-21 NOTE — ED NOTES
After receiving report from previous RN this writer went to pt room to assess pt. Pt not in room or bathroom. Spoke with ED tech who reported pt had left. MD notified.

## 2023-10-21 NOTE — CONSULTS
I was asked to see this 73-year-old patient who presented with headache and elevated ESR.  The case was discussed with rheumatology, who requested treatment with IV steroids and temporal artery biopsy.  The patient is maintained on Coumadin.    The patient complains of a frontal headache.  She has also noticed some visual changes on the right side.  She denies any temporal pain.    Physical exam:  The patient appears mildly uncomfortable.  Breathing is nonlabored.  The right temporal region reveals an easily palpable temporal artery pulse.  There is no tenderness here.    INR is 2.2    Assessment and plan: This is a patient with a headache and some visual changes on the right.  I have been asked to arrange temporal artery biopsy.  There is certainly no tenderness over the temporal artery, but we can work on arranging biopsy once her INR has normalized.  I briefly discussed temporal artery biopsy with the patient today.    We will follow the patient with you.    Curly Grubbs MD  [facility]

## 2023-10-21 NOTE — PLAN OF CARE
"BP (!) 180/84 (BP Location: Left arm)   Pulse 112   Temp 98.6  F (37  C) (Oral)   Resp 20   Ht 1.626 m (5' 4\")   Wt 78.3 kg (172 lb 9.6 oz)   SpO2 96%   BMI 29.63 kg/m          A&O. A1 w/ walker. On RA. C/o 8/10 headache, some relief with compazine. PIV SL. Getting IV Solumedrol. Plan is for surgery consult for temporal arteritis biopsy. On Coumadin. Will continue POC.     Branden TALAMANTES for high BPs, states will address   "

## 2023-10-21 NOTE — ED NOTES
"Patient yelling at writer, while writer was walking a patient into the triage room. \"All of these people have been roomed before me!\" Expressed to patient that writer was triaging a patient and not rooming patient. Patient upset by this.   Family member up to the desk multiple times to complain about the wait.   "

## 2023-10-21 NOTE — ED TRIAGE NOTES
Arrives from home. States that she was seen earlier here today for headache. States left was not discharged.     States she then received a phone call to return to the ED.

## 2023-10-22 LAB
GLUCOSE BLDC GLUCOMTR-MCNC: 139 MG/DL (ref 70–99)
GLUCOSE BLDC GLUCOMTR-MCNC: 142 MG/DL (ref 70–99)
GLUCOSE BLDC GLUCOMTR-MCNC: 144 MG/DL (ref 70–99)
GLUCOSE BLDC GLUCOMTR-MCNC: 152 MG/DL (ref 70–99)
GLUCOSE BLDC GLUCOMTR-MCNC: 175 MG/DL (ref 70–99)
HOLD SPECIMEN: NORMAL
HOLD SPECIMEN: NORMAL
INR PPP: 1.52 (ref 0.85–1.15)
MAGNESIUM SERPL-MCNC: 2.1 MG/DL (ref 1.7–2.3)
POTASSIUM SERPL-SCNC: 4.9 MMOL/L (ref 3.4–5.3)

## 2023-10-22 PROCEDURE — 250N000013 HC RX MED GY IP 250 OP 250 PS 637: Performed by: INTERNAL MEDICINE

## 2023-10-22 PROCEDURE — 99232 SBSQ HOSP IP/OBS MODERATE 35: CPT | Performed by: INTERNAL MEDICINE

## 2023-10-22 PROCEDURE — 258N000003 HC RX IP 258 OP 636: Performed by: INTERNAL MEDICINE

## 2023-10-22 PROCEDURE — 36415 COLL VENOUS BLD VENIPUNCTURE: CPT | Performed by: INTERNAL MEDICINE

## 2023-10-22 PROCEDURE — 85610 PROTHROMBIN TIME: CPT | Performed by: SURGERY

## 2023-10-22 PROCEDURE — 84132 ASSAY OF SERUM POTASSIUM: CPT | Performed by: INTERNAL MEDICINE

## 2023-10-22 PROCEDURE — 250N000011 HC RX IP 250 OP 636: Performed by: EMERGENCY MEDICINE

## 2023-10-22 PROCEDURE — 120N000001 HC R&B MED SURG/OB

## 2023-10-22 PROCEDURE — 83735 ASSAY OF MAGNESIUM: CPT | Performed by: INTERNAL MEDICINE

## 2023-10-22 PROCEDURE — 250N000011 HC RX IP 250 OP 636: Performed by: INTERNAL MEDICINE

## 2023-10-22 PROCEDURE — 36415 COLL VENOUS BLD VENIPUNCTURE: CPT | Performed by: SURGERY

## 2023-10-22 RX ORDER — CEFAZOLIN SODIUM 2 G/100ML
2 INJECTION, SOLUTION INTRAVENOUS SEE ADMIN INSTRUCTIONS
Status: CANCELLED | OUTPATIENT
Start: 2023-10-22

## 2023-10-22 RX ORDER — CEFAZOLIN SODIUM 2 G/100ML
2 INJECTION, SOLUTION INTRAVENOUS
Status: CANCELLED | OUTPATIENT
Start: 2023-10-22

## 2023-10-22 RX ORDER — HYDROMORPHONE HYDROCHLORIDE 2 MG/1
4 TABLET ORAL EVERY 4 HOURS PRN
Status: DISCONTINUED | OUTPATIENT
Start: 2023-10-22 | End: 2023-10-25 | Stop reason: HOSPADM

## 2023-10-22 RX ADMIN — NICOTINE 1 PATCH: 21 PATCH, EXTENDED RELEASE TRANSDERMAL at 02:28

## 2023-10-22 RX ADMIN — PANTOPRAZOLE SODIUM 40 MG: 40 TABLET, DELAYED RELEASE ORAL at 07:06

## 2023-10-22 RX ADMIN — OXYCODONE HYDROCHLORIDE 5 MG: 5 TABLET ORAL at 17:05

## 2023-10-22 RX ADMIN — ATORVASTATIN CALCIUM 20 MG: 20 TABLET, FILM COATED ORAL at 09:36

## 2023-10-22 RX ADMIN — SODIUM CHLORIDE 500 MG: 9 INJECTION, SOLUTION INTRAVENOUS at 00:06

## 2023-10-22 RX ADMIN — Medication 5 MG: at 21:46

## 2023-10-22 RX ADMIN — HYDROMORPHONE HYDROCHLORIDE 0.3 MG: 1 INJECTION, SOLUTION INTRAMUSCULAR; INTRAVENOUS; SUBCUTANEOUS at 14:39

## 2023-10-22 RX ADMIN — PANTOPRAZOLE SODIUM 40 MG: 40 TABLET, DELAYED RELEASE ORAL at 16:54

## 2023-10-22 RX ADMIN — HYDROMORPHONE HYDROCHLORIDE 0.3 MG: 1 INJECTION, SOLUTION INTRAMUSCULAR; INTRAVENOUS; SUBCUTANEOUS at 11:31

## 2023-10-22 RX ADMIN — OXYCODONE HYDROCHLORIDE 5 MG: 5 TABLET ORAL at 10:21

## 2023-10-22 RX ADMIN — SODIUM CHLORIDE 500 MG: 9 INJECTION, SOLUTION INTRAVENOUS at 23:53

## 2023-10-22 RX ADMIN — HYDROMORPHONE HYDROCHLORIDE 4 MG: 2 TABLET ORAL at 21:46

## 2023-10-22 RX ADMIN — HYDROMORPHONE HYDROCHLORIDE 4 MG: 2 TABLET ORAL at 17:42

## 2023-10-22 RX ADMIN — AMLODIPINE BESYLATE 10 MG: 10 TABLET ORAL at 09:36

## 2023-10-22 RX ADMIN — LISINOPRIL 40 MG: 40 TABLET ORAL at 09:36

## 2023-10-22 ASSESSMENT — ACTIVITIES OF DAILY LIVING (ADL)
ADLS_ACUITY_SCORE: 32

## 2023-10-22 NOTE — PLAN OF CARE
Goal Outcome Evaluation:      Plan of Care Reviewed With: patient      Shift: 11pm-7am    Vitals: Temp: 98.2  F (36.8  C) Temp src: Oral BP: (!) 149/71 Pulse: 97   Resp: 18 SpO2: 92 % O2 Device: None (Room air)       Orientation: alert  Pain: complains of neck pain, ice pack given  Tele: --  Activity: Assist of 1 w/ gb and walker  Resp: coarse lung sounds, on RA  Diet: mod carb diet  How to take meds: whole w/ fluids  GI & : up to toilet, no bm this shift   Protocol: potassium and magnesium   BG: --  Skin: scattered bruising   Lines: IV on left hand, dried drainage   Other: nicotine patch on right arm

## 2023-10-22 NOTE — PLAN OF CARE
"BP (!) 169/87 (BP Location: Right arm)   Pulse 99   Temp 97.9  F (36.6  C) (Oral)   Resp 18   Ht 1.626 m (5' 4\")   Wt 78.3 kg (172 lb 9.6 oz)   SpO2 94%   BMI 29.63 kg/m          A&O. A1 w/ walker. On RA. C/o 8/10 headache/ ear ache,  relief with IV Dilaudid. PIV SL. Getting IV Solumedrol. Plan is for surgery consult for temporal arteritis biopsy. Will consult Neuro as well. On Coumadin, INR 1.52. Will continue POC.       "

## 2023-10-22 NOTE — PROGRESS NOTES
Hospitalist Medicine Progress Note   [facility]       Grace Jorgensen is a 73 year old lady with HTN, T2DM, PAF paroxysmal atrial fibrillation on warfarin therapy, stroke, chronic dizziness came to [facility] 10/20/2023 with history of severe headache associated with blurred vision x 6 days        Date of Admission:  10/20/2023  Assessment & Plan     Possible temporal arteritis  Headache  Visual changes  Elevated ESR  Facial pain with headache?  Trigeminal neuralgia  -CT head in the ED shows no acute intracranial process.  -ED provider contacted rheumatologist on-call and was advised to start Solu-Medrol  mg for 3 days and to get temporal artery biopsy  -Started on IV steroids  -Appreciate general surgery consult for temporal artery biopsy   -Consult neurology as patient is not complaining of facial pains with?  Trigeminal neuralgia     Type 2 diabetes mellitus with history of polyneuropathy  -Prior to admission patient on metformin  -We will place patient on sliding scale given that patient will be on steroids we will need to closely monitor blood glucose  -Check hemoglobin A1c  -Diabetic diet  -Prior records show patient was on gabapentin (not on the med list) will need to review reorder meds once reconciled     Paroxysmal atrial fibrillation  -Not on any medication to control rate per med list  -Heart rate is in 90s  -On warfarin at baseline holding for possible biopsy.  Can be resumed after biopsy     Hypertension  -Prior to admission patient on lisinopril and amlodipine we will continue  Blood pressure is somewhat elevated on treatment with IV steroids high-dose     Hyperlipidemia  -Continue on statins     GERD  -Continue PPI now that patient is also on steroids     Depression  -Continue prior to admission meds     Tobacco use disorder  -Ordered nicotine patch            Plan:   Discussed with surgery -INR is still 2.2 so biopsy will not be done at this time  Will  discuss with ophthalmology and the patient states that the blurring of vision is improving  Stop IV Solu-Medrol  Consult neurology      Diet: Moderate Consistent Carb (60 g CHO per Meal) DietDiabetic diet  DVT Prophylaxis: Warfarin  Waterman Catheter: Not present  Code Status: Full Code               The patient's care was discussed with the Patient and general surgery.    Ramses Preston MD  Hospitalist Service  [facility]    ______________________________________________________________________    Interval History     Symptoms   Patient states that the headache continues to be severe 9/10  She says her blurring of vision is improving but not completely  She states that she now has right facial and ear pain and would like to have something for the ear in the form of eardrops    Review of Systems:   Denies any nausea or vomiting    Data reviewed today: I reviewed all medications, new labs and imaging results over the last 24 hours.     Physical Exam   Vital Signs: Temp: 97.9  F (36.6  C) Temp src: Oral BP: (!) 169/87 Pulse: 99   Resp: 18 SpO2: 94 % O2 Device: None (Room air)    Weight: 172 lbs 9.6 oz      GENERAL: Patient is not in acute distress  HEENT: EOM+,Conjunctiva is clear   NECK:  no Jugular Venous distention  HEART: S1 S2 regular Rate and Rhythm, there is  no murmur,   LUNGS: Respirations are  not laboured, Lungs are  clear to auscultation without Crepitations or Wheezing   ABDOMEN: Soft , there is no tenderness ,Bowel Sounds are  Positive   LOWER LIMBS: no  Pedal Edema  Bilaterally   CNS:  Alert,  Oriented x 3, Moving all the Four Limbs     Data   Recent Labs   Lab 10/22/23  0845 10/22/23  0641 10/22/23  0210 10/21/23  2148 10/21/23  0820 10/21/23  0644 10/21/23  0129 10/20/23  1642   WBC  --   --   --   --   --  5.9  --  7.9   HGB  --   --   --   --   --  9.6*  --  9.7*   MCV  --   --   --   --   --  82  --  84   PLT  --   --   --   --   --  390  --  429   INR  --   --   --   --   --   2.20*  --  2.40*   NA  --   --   --   --   --  134*  --  135   POTASSIUM  --  4.9  --   --   --  4.1  --  4.8   CHLORIDE  --   --   --   --   --  102  --  102   CO2  --   --   --   --   --  20*  --  21*   BUN  --   --   --   --   --  14.1  --  13.9   CR  --   --   --   --   --  0.86  --  0.78   ANIONGAP  --   --   --   --   --  12  --  12   SERA  --   --   --   --   --  9.2  --  9.7   *  --  142* 133*   < > 188*   < > 105*   ALBUMIN  --   --   --   --   --   --   --  3.9   PROTTOTAL  --   --   --   --   --   --   --  7.6   BILITOTAL  --   --   --   --   --   --   --  <0.2   ALKPHOS  --   --   --   --   --   --   --  86   ALT  --   --   --   --   --   --   --  14   AST  --   --   --   --   --   --   --  23    < > = values in this interval not displayed.         No results found for this or any previous visit (from the past 24 hour(s)).

## 2023-10-22 NOTE — PLAN OF CARE
Goal Outcome Evaluation:      Plan of Care Reviewed With: patient    Overall Patient Progress: no changeOverall Patient Progress: no change     A/Ox4, up in chair with SBA, walker and belt. . C/o headache, oxy given with relief. Ice applied. Waiting for biopsy, when INR down. Continue to monitor.

## 2023-10-22 NOTE — CONSULTS
Care Management Follow Up    Length of Stay (days): 2    Expected Discharge Date: 10/23/2023    Additional Information:  Patient has a high URR of 23%.     Per chart review, patient lives at home and is single.     SW attempted to meet with patient at bedside. She stated she did not want to talk right now. Patient is in pain.     CLAUDE Wright, [facility]  Emergency Room   Please contact the SW on the floor in which the patient is staying for any questions or concerns

## 2023-10-23 ENCOUNTER — ANESTHESIA (OUTPATIENT)
Dept: MEDSURG UNIT | Facility: CLINIC | Age: 73
DRG: 545 | End: 2023-10-23
Payer: COMMERCIAL

## 2023-10-23 ENCOUNTER — ANESTHESIA EVENT (OUTPATIENT)
Dept: MEDSURG UNIT | Facility: CLINIC | Age: 73
DRG: 545 | End: 2023-10-23
Payer: COMMERCIAL

## 2023-10-23 ENCOUNTER — APPOINTMENT (OUTPATIENT)
Dept: CT IMAGING | Facility: CLINIC | Age: 73
DRG: 545 | End: 2023-10-23
Attending: PSYCHIATRY & NEUROLOGY
Payer: COMMERCIAL

## 2023-10-23 LAB
GLUCOSE BLDC GLUCOMTR-MCNC: 137 MG/DL (ref 70–99)
GLUCOSE BLDC GLUCOMTR-MCNC: 144 MG/DL (ref 70–99)
GLUCOSE BLDC GLUCOMTR-MCNC: 146 MG/DL (ref 70–99)
GLUCOSE BLDC GLUCOMTR-MCNC: 148 MG/DL (ref 70–99)
GLUCOSE BLDC GLUCOMTR-MCNC: 154 MG/DL (ref 70–99)
GLUCOSE BLDC GLUCOMTR-MCNC: 162 MG/DL (ref 70–99)
INR PPP: 1.47 (ref 0.85–1.15)
MAGNESIUM SERPL-MCNC: 2.1 MG/DL (ref 1.7–2.3)
PLATELET # BLD AUTO: 416 10E3/UL (ref 150–450)
POTASSIUM SERPL-SCNC: 4.1 MMOL/L (ref 3.4–5.3)

## 2023-10-23 PROCEDURE — 36415 COLL VENOUS BLD VENIPUNCTURE: CPT | Performed by: INTERNAL MEDICINE

## 2023-10-23 PROCEDURE — 72125 CT NECK SPINE W/O DYE: CPT

## 2023-10-23 PROCEDURE — 84132 ASSAY OF SERUM POTASSIUM: CPT | Performed by: INTERNAL MEDICINE

## 2023-10-23 PROCEDURE — 85610 PROTHROMBIN TIME: CPT | Performed by: INTERNAL MEDICINE

## 2023-10-23 PROCEDURE — 250N000013 HC RX MED GY IP 250 OP 250 PS 637: Performed by: INTERNAL MEDICINE

## 2023-10-23 PROCEDURE — 85049 AUTOMATED PLATELET COUNT: CPT | Performed by: INTERNAL MEDICINE

## 2023-10-23 PROCEDURE — 250N000012 HC RX MED GY IP 250 OP 636 PS 637: Performed by: INTERNAL MEDICINE

## 2023-10-23 PROCEDURE — 120N000001 HC R&B MED SURG/OB

## 2023-10-23 PROCEDURE — 83735 ASSAY OF MAGNESIUM: CPT | Performed by: INTERNAL MEDICINE

## 2023-10-23 PROCEDURE — 99233 SBSQ HOSP IP/OBS HIGH 50: CPT | Performed by: INTERNAL MEDICINE

## 2023-10-23 RX ORDER — PREDNISONE 20 MG/1
60 TABLET ORAL DAILY
Status: DISCONTINUED | OUTPATIENT
Start: 2023-10-23 | End: 2023-10-25 | Stop reason: HOSPADM

## 2023-10-23 RX ORDER — MELOXICAM 7.5 MG/1
15 TABLET ORAL DAILY
Status: DISCONTINUED | OUTPATIENT
Start: 2023-10-23 | End: 2023-10-25 | Stop reason: HOSPADM

## 2023-10-23 RX ORDER — CLONAZEPAM 0.5 MG/1
0.5 TABLET ORAL 2 TIMES DAILY
Status: DISCONTINUED | OUTPATIENT
Start: 2023-10-23 | End: 2023-10-25 | Stop reason: HOSPADM

## 2023-10-23 RX ORDER — MULTIPLE VITAMINS W/ MINERALS TAB 9MG-400MCG
1 TAB ORAL DAILY
Status: DISCONTINUED | OUTPATIENT
Start: 2023-10-23 | End: 2023-10-25 | Stop reason: HOSPADM

## 2023-10-23 RX ORDER — GABAPENTIN 300 MG/1
300 CAPSULE ORAL 3 TIMES DAILY
Status: DISCONTINUED | OUTPATIENT
Start: 2023-10-23 | End: 2023-10-25 | Stop reason: HOSPADM

## 2023-10-23 RX ORDER — LIDOCAINE 40 MG/G
CREAM TOPICAL
Status: CANCELLED | OUTPATIENT
Start: 2023-10-23

## 2023-10-23 RX ORDER — SODIUM CHLORIDE, SODIUM LACTATE, POTASSIUM CHLORIDE, CALCIUM CHLORIDE 600; 310; 30; 20 MG/100ML; MG/100ML; MG/100ML; MG/100ML
INJECTION, SOLUTION INTRAVENOUS CONTINUOUS
Status: CANCELLED | OUTPATIENT
Start: 2023-10-23

## 2023-10-23 RX ORDER — CITALOPRAM HYDROBROMIDE 20 MG/1
40 TABLET ORAL DAILY
Status: DISCONTINUED | OUTPATIENT
Start: 2023-10-23 | End: 2023-10-25 | Stop reason: HOSPADM

## 2023-10-23 RX ADMIN — ATORVASTATIN CALCIUM 20 MG: 20 TABLET, FILM COATED ORAL at 08:36

## 2023-10-23 RX ADMIN — MELOXICAM 15 MG: 7.5 TABLET ORAL at 10:59

## 2023-10-23 RX ADMIN — AMLODIPINE BESYLATE 10 MG: 10 TABLET ORAL at 08:36

## 2023-10-23 RX ADMIN — PANTOPRAZOLE SODIUM 40 MG: 40 TABLET, DELAYED RELEASE ORAL at 17:37

## 2023-10-23 RX ADMIN — HYDROMORPHONE HYDROCHLORIDE 4 MG: 2 TABLET ORAL at 14:58

## 2023-10-23 RX ADMIN — PANTOPRAZOLE SODIUM 40 MG: 40 TABLET, DELAYED RELEASE ORAL at 06:35

## 2023-10-23 RX ADMIN — WARFARIN SODIUM 7.5 MG: 5 TABLET ORAL at 17:37

## 2023-10-23 RX ADMIN — LISINOPRIL 40 MG: 40 TABLET ORAL at 08:36

## 2023-10-23 RX ADMIN — GABAPENTIN 300 MG: 300 CAPSULE ORAL at 10:58

## 2023-10-23 RX ADMIN — GABAPENTIN 300 MG: 300 CAPSULE ORAL at 21:09

## 2023-10-23 RX ADMIN — NICOTINE 1 PATCH: 21 PATCH, EXTENDED RELEASE TRANSDERMAL at 01:10

## 2023-10-23 RX ADMIN — CLONAZEPAM 0.5 MG: 0.5 TABLET ORAL at 20:57

## 2023-10-23 RX ADMIN — PREDNISONE 60 MG: 20 TABLET ORAL at 11:00

## 2023-10-23 RX ADMIN — GABAPENTIN 300 MG: 300 CAPSULE ORAL at 15:00

## 2023-10-23 RX ADMIN — MULTIPLE VITAMINS W/ MINERALS TAB 1 TABLET: TAB at 17:35

## 2023-10-23 RX ADMIN — CITALOPRAM HYDROBROMIDE 40 MG: 20 TABLET ORAL at 17:57

## 2023-10-23 RX ADMIN — HYDROMORPHONE HYDROCHLORIDE 4 MG: 2 TABLET ORAL at 08:36

## 2023-10-23 ASSESSMENT — ACTIVITIES OF DAILY LIVING (ADL)
ADLS_ACUITY_SCORE: 32

## 2023-10-23 NOTE — PROGRESS NOTES
Hospitalist Medicine Progress Note   [facility]       Grace Jorgensen is a 73 year old lady with HTN, T2DM, PAF paroxysmal atrial fibrillation on warfarin therapy, stroke, chronic dizziness came to [facility] 10/20/2023 with history of severe headache associated with blurred vision x 6 days        Date of Admission:  10/20/2023  Assessment & Plan     Possible temporal arteritis  Headache  Visual changes  Elevated ESR  Facial pain with headache?  Trigeminal neuralgia  -CT head in the ED shows no acute intracranial process.  -ED provider contacted rheumatologist on-call and was advised to start Solu-Medrol  mg for 3 days and to get temporal artery biopsy  -Started on IV steroids whch on 10/23/2023 which changed to prednisone 60 mg daily  -Appreciate general surgery consult for temporal artery biopsy but the patient refused to have the biopsy done  -Consult neurology as patient is not complaining of facial pains will rule out trigeminal neuralgia she insisted that she takes gabapentin before and when she was started on gabapentin 300 mg 3 times daily on 10/23/2023 her symptoms improved     Type 2 diabetes mellitus with history of polyneuropathy  -Prior to admission patient on metformin  -We will place patient on sliding scale given that patient will be on steroids we will need to closely monitor blood glucose  -Check hemoglobin A1c  -Diabetic diet  -Prior records show patient was on gabapentin (not on the med list) she was started on gabapentin 300 mg 3 times daily on 10/23/2023     Paroxysmal atrial fibrillation  -Not on any medication to control rate per med list  -Heart rate is in 80s and 90s  -On warfarin at baseline holding for possible biopsy.  This was resumed 10/23/2023     Hypertension  -Prior to admission patient on lisinopril and amlodipine we will continue  Blood pressure is somewhat elevated on treatment with IV steroids high-dose     Hyperlipidemia  -Continue  on statins     GERD  -Continue PPI now that patient is also on steroids     Depression  -Continue prior to admission meds     Tobacco use disorder  -Ordered nicotine patch            Plan:   Gabapentin 300 mg 3 times daily was started  Stop IV Solu-Medrol and started on prednisone 60 mg daily  Consult neurology      Diet: Moderate Consistent Carb (60 g CHO per Meal) Diet  Snacks/Supplements Adult: Ensure Max Protein (bariatric); With MealsDiabetic diet  DVT Prophylaxis: Warfarin  Waterman Catheter: Not present  Code Status: Full Code               The patient's care was discussed with the Patient and her son who came to visit her    I also discussed the patient plan of care with rheumatology who wanted her to be started on Mobic 30 mg daily as well as prednisone.     Ramses Preston MD  Hospitalist Service  [facility]    ______________________________________________________________________    Interval History     Symptoms   Patient says that her pain is 10/10 when I earlier saw her in the morning but after starting gabapentin her pain was somewhat better in the evening    Review of Systems:   Denies any nausea or vomiting  She feels that her left eye vision might be somewhat different than before    Data reviewed today: I reviewed all medications, new labs and imaging results over the last 24 hours.     Physical Exam   Vital Signs: Temp: 97.8  F (36.6  C) Temp src: Oral BP: (!) 166/79 Pulse: 89   Resp: 18 SpO2: 93 % O2 Device: None (Room air)    Weight: 172 lbs 9.6 oz      GENERAL: Patient is not in acute distress  HEENT: EOM+,Conjunctiva is clear   NECK:  no Jugular Venous distention  HEART: S1 S2 regular Rate and Rhythm, there is  no murmur,   LUNGS: Respirations are  not laboured, Lungs are  clear to auscultation without Crepitations or Wheezing   ABDOMEN: Soft , there is no tenderness ,Bowel Sounds are  Positive   LOWER LIMBS: no  Pedal Edema  Bilaterally   CNS:  Alert,  Oriented x 3, Moving  all the Four Limbs     Data   Recent Labs   Lab 10/23/23  1458 10/23/23  1141 10/23/23  0814 10/23/23  0633 10/23/23  0557 10/22/23  1208 10/22/23  1155 10/22/23  0845 10/22/23  0641 10/21/23  0820 10/21/23  0644 10/21/23  0129 10/20/23  1642   WBC  --   --   --   --   --   --   --   --   --   --  5.9  --  7.9   HGB  --   --   --   --   --   --   --   --   --   --  9.6*  --  9.7*   MCV  --   --   --   --   --   --   --   --   --   --  82  --  84   PLT  --   --   --   --  416  --   --   --   --   --  390  --  429   INR 1.47*  --   --   --   --   --  1.52*  --   --   --  2.20*  --  2.40*   NA  --   --   --   --   --   --   --   --   --   --  134*  --  135   POTASSIUM  --   --   --   --  4.1  --   --   --  4.9  --  4.1  --  4.8   CHLORIDE  --   --   --   --   --   --   --   --   --   --  102  --  102   CO2  --   --   --   --   --   --   --   --   --   --  20*  --  21*   BUN  --   --   --   --   --   --   --   --   --   --  14.1  --  13.9   CR  --   --   --   --   --   --   --   --   --   --  0.86  --  0.78   ANIONGAP  --   --   --   --   --   --   --   --   --   --  12  --  12   SERA  --   --   --   --   --   --   --   --   --   --  9.2  --  9.7   GLC  --  162* 154* 148*  --    < >  --    < >  --    < > 188*   < > 105*   ALBUMIN  --   --   --   --   --   --   --   --   --   --   --   --  3.9   PROTTOTAL  --   --   --   --   --   --   --   --   --   --   --   --  7.6   BILITOTAL  --   --   --   --   --   --   --   --   --   --   --   --  <0.2   ALKPHOS  --   --   --   --   --   --   --   --   --   --   --   --  86   ALT  --   --   --   --   --   --   --   --   --   --   --   --  14   AST  --   --   --   --   --   --   --   --   --   --   --   --  23    < > = values in this interval not displayed.         No results found for this or any previous visit (from the past 24 hour(s)).

## 2023-10-23 NOTE — PROGRESS NOTES
CLINICAL NUTRITION SERVICES - ASSESSMENT NOTE     Nutrition Prescription    Malnutrition Status:    % Intake: </= 50% for >/= 5 days (severe)  % Weight Loss: Weight loss does not meet criteria  Subcutaneous Fat Loss: None observed  Muscle Loss: Thoracic region (clavicle, acromium bone):  mild and Dorsal hand:  mild to moderate  Fluid Accumulation/Edema: None noted  Malnutrition Diagnosis: Severe malnutrition in the context of acute illness    Recommendations already ordered by Registered Dietitian (RD):  Medical food supplement therapy  Multivitamin/mineral supplement therapy - Ensure Max (patient anticipates leaving today, will try high protein, low CHO supplement in case she stays)  Collaboration and Referral of Nutrition care: patient with notable leg tremor--informed RN     Future/Additional Recommendations:  Monitor intake, acceptance of supplement      REASON FOR ASSESSMENT  Grace Jorgensen is a/an 73 year old female assessed by the dietitian for MST score of 3: positive  for weight loss of 14-23 lbs and positive  for poor oral intake related to decreased appetite    Pt admitted d/t severe headache associated with blurry vision x 6 days,possible temporal arteritis. PMH significant for HTN, T2DM, PAF paroxysmal atrial fibrillation on warfarin therapy, stroke, chronic dizziness.     NUTRITION HISTORY  - Information obtained from patient and her son. Patient follows a regular diet and reported a good appetite until about 8 days ago when the pain in her head became unbearable. She reports the food here is terrible, which has prevented her from eating well while hospitalized. She has gastroparesis, but reports her device (pacer?) helps with this. She reports gradual weight loss over the past few months, but her intake became very poor just over a week ago. She lives with her son who is helpful to her at home and he has also been bringing in some foods for her here. She doesn't take any ONS at home, but is  "willing to do so.   - Allergies: NKFA    CURRENT NUTRITION ORDERS  Diet: Moderate Consistent Carbohydrate  Intake/Tolerance: poor- 25% of meals    LABS  - Na 134  - A1c 6.2    MEDICATIONS  - Lipitor  - sliding scale insulin  - pantoprazole  - prednisone    ANTHROPOMETRICS  Height: 162.6 cm (5' 4\")  Most Recent Weight: 78.3 kg (172 lb 9.6 oz)  - standing scale  IBW: 54.5 kg  Body mass index is 29.63 kg/m .  BMI: Overweight BMI 25-29.9  Weight History:   Wt Readings from Last 20 Encounters:   10/21/23 78.3 kg (172 lb 9.6 oz)   11/09/22 79.4 kg (175 lb)   11/06/21 86.2 kg (190 lb)   03/27/18 81.6 kg (180 lb)     Per Care Everywhere:  09/05/23 82 kg (180 lb 12.8 oz)  03/07/23 84.6 kg (186 lb 6.4 oz)  11/08/22 84.9 kg (187 lb 3.2 oz)    3.7 kg (4.5%) weight loss over 1.5 months, 6.3 kg (7.4%) weight loss over 7.5 months    ASSESSED NUTRITION NEEDS PER APPROVED PRACTICE GUIDELINES  Dosing Weight: 78.3 kg, current weight  Estimated Energy Needs: 3149-3597 kcals/day (20 - 25 kcals/kg)  Justification: Maintenance  Estimated Protein Needs: 78-94 grams protein/day (1-1.2 grams of pro/kg)  Justification: maintenance vs repletion  Estimated Fluid Needs: (1 mL/kcal)   Justification: Maintenance    PHYSICAL FINDINGS  See malnutrition section above    MALNUTRITION  As noted above    NUTRITION DIAGNOSIS  Inadequate oral intake related to pain PTA in addition to food preferences since admission as evidenced by PO meeting <50% of needs for >5 days      INTERVENTIONS  Implementation  Nutrition Education: Introduced self and role, discussed PO PTA and since admission, discussed supplements available. Encouraged adequate intake of food and fluids.       Medical food supplement therapy  Multivitamin/mineral supplement therapy - Ensure Max (patient anticipates leaving today, will try high protein, low CHO supplement in case she stays)  Collaboration and Referral of Nutrition care: patient with notable leg tremor--informed RN "     Goals  Patient to consume % of nutritionally adequate meal trays TID, or the equivalent with supplements/snacks.     Monitoring/Evaluation  Progress toward goals will be monitored and evaluated per protocol.  Nyasia Kyle RD, [place], [facility]  Pager - 3rd floor/ICU: [phone]  Pager - All other floors: [phone]  Pager - Weekend/holiday: [phone]  Office: [phone]

## 2023-10-23 NOTE — PROGRESS NOTES
Gen Surg Progress Note  10/23/2023    Discussed biopsy with patient including risks/benefits/alternatives. When patient asked if the biopsy would fix the pain immediately she was informed no.  She stated she did not want any procedure done with risks such as this and would rather have empiric treatment if thought necessary.   Thank you for involvement in this patients care. General surgery will sign off at this time.

## 2023-10-23 NOTE — PLAN OF CARE
Cared for [id number]    Pt A/O x 4 (can be forgetful), VSS; Pt denies dizziness, N/V & SOB. Pt reports 10/10 headache - administered PO Dilaudid. Pt up Asst: 1 w/gait belt & walker. Lung sounds clear, RA. Potassium/Mag protocols within parameters - recheck in AM. Blood glucose levels: 154, 162. General Surgery following. Pt changed mind regarding Biopsy this morning. Plan to discharge later today. Will continue with plan of care.

## 2023-10-23 NOTE — CONSULTS
Neurology Consult Note  [facility]       [2023]                                                                                       Admission Date: 10/20/2023  Hospital Day: 4      Patient: Grace Jorgensen      : 1950  MRN:  [id number]     CC:      Chief Complaint   Patient presents with    Headache       Consult Request:  Referring Provider:  Jacqueline Campuzano MD  Primary Care Provider:  Sabas Khalil MD        HPI:  Grace Jorgensen is a 73 year old yo female admitted for severe headache on the right side of her head since 10/12/23. She has h/o car sickness in her childhood and   Intermittent headaches through her adult life. She c/o pain on the right side of her head, right face and right neck. She c/o photophobia, phonophobia and nausea with her headache. She had blurred vision but denies any auras.  She had high sed rate and she was suspected to have Temporal arteritis. Started on IV solumedrol by rheumatology.  Patient has mild forgetfulness.  C/o pain in legs intermittently- better with gabapentin.        A complete review of symptoms was performed including vascular, infectious, cardiovascular, pulmonary, gastrointestinal, endocrinological, hematologic, dermatologic, musculoskeletal, and neurological. All were normal except as above.    PAST MEDICAL HISTORY:  ALLERGIES:   Allergies   Allergen Reactions    Bupropion GI Disturbance     Tobacco:    History   Smoking Status    Not on file   Smokeless Tobacco    Not on file     Alcohol:  Social History    Substance and Sexual Activity      Alcohol use: Not on file    MEDICATIONS:       CURRENTLY SCHEDULED MEDICATIONS    amLODIPine  10 mg Oral Daily    atorvastatin  20 mg Oral Daily    citalopram  40 mg Oral Daily    clonazePAM  0.5 mg Oral BID    gabapentin  300 mg Oral TID    insulin aspart  1-7 Units Subcutaneous TID AC    insulin aspart  1-5 Units Subcutaneous At Bedtime     "lisinopril  40 mg Oral Daily    meloxicam  15 mg Oral Daily    [START ON 10/24/2023] metFORMIN  500 mg Oral Daily with breakfast    multivitamin w/minerals  1 tablet Oral Daily    nicotine  1 patch Transdermal Q24H    nicotine   Transdermal Q8H    pantoprazole  40 mg Oral BID AC    predniSONE  60 mg Oral Daily    Warfarin Therapy Reminder  1 each Oral See Admin Instructions          HOME MEDICATIONS  Medications Prior to Admission   Medication Sig Dispense Refill Last Dose    amLODIPine (NORVASC) 5 MG tablet Take 5-10 mg by mouth daily   10/19/2023 at pm    atorvastatin (LIPITOR) 20 MG tablet Take 20 mg by mouth daily   10/19/2023 at pm    citalopram (CELEXA) 40 MG tablet Take 40 mg by mouth daily   10/19/2023    ketoconazole (NIZORAL) 2 % external shampoo Every two weeks prn   prn    lisinopril (ZESTRIL) 40 MG tablet Take 40 mg by mouth daily   10/19/2023    metFORMIN (GLUCOPHAGE) 500 MG tablet Take 500 mg by mouth daily (with breakfast)   10/19/2023    pantoprazole (PROTONIX) 20 MG EC tablet Take 40 mg by mouth every evening   10/19/2023    warfarin ANTICOAGULANT (COUMADIN) 5 MG tablet Take 10 mg by mouth daily Sunday, Tuesday, Thursday, Saturday   10/19/2023    warfarin ANTICOAGULANT (COUMADIN) 5 MG tablet Take 5 mg by mouth Every Monday, Wednesday, and Friday   10/18/2023     MEDICAL HISTORY  No past medical history on file.  SURGICAL HISTORY  No past surgical history on file.  FAMILY HISTORY    No family history on file.  SOCIAL HISTORY  Social History     Socioeconomic History    Marital status: Single            Height: 162.6 cm (5' 4\")     Temp: 97.8  F (36.6  C)   Weight: 78.3 kg (172 lb 9.6 oz)    Temp src: Oral         BP: (!) 166/79         Estimated body mass index is 29.63 kg/m  as calculated from the following:    Height as of this encounter: 1.626 m (5' 4\").    Weight as of this encounter: 78.3 kg (172 lb 9.6 oz).    Resp: 18   SpO2: 93 %   O2 Device: None (Room air)     Blood Pressure:   BP Readings " from Last 3 Encounters:   10/23/23 (!) 166/79   10/20/23 (!) 152/74   23 135/76     T24 : Temp (24hrs), Av.9  F (36.6  C), Min:97.8  F (36.6  C), Max:98.1  F (36.7  C)       GENERAL EXAMINATION:  HEENT: PERRLA, EOMI.No tenderness over temporal artery.  Neck: Myofascial tender points in paracervical area.  .    NEUROLOGICAL EXAMINATION  Mental Status:  Patient is awake, alert, and oriented X3.Could not draw a clock. Remembers 3/5 words at 5 min..      Cranial Nerves: Pupils are equal and reactive to light.Visual fields are full to confrontation. Extraocular movements are intact. There is no nystagmus in the horizontal or vertical planes.No facial sensory deficits. No facial weakness. The tongue is midline.     Motor: Muscle tone is normal. There is no pronator drift. There is no muscle atrophy. The strength is 5/5 in upper and lower extremities bilaterally. Deep tendon reflexes are brisk bilaterally with clonus at ankles. Plantars are extensors.      .  LABORATORY RESULTS      Recent Labs   Lab 10/23/23  0557 10/21/23  0644 10/20/23  1642   WBC  --  5.9 7.9   HGB  --  9.6* 9.7*   HCT  --  30.8* 32.1*   MCV  --  82 84    390 429     Recent Labs   Lab 10/23/23  1752 10/23/23  1141 10/23/23  0814 10/23/23  0633 10/23/23  0557 10/22/23  0845 10/22/23  0641 10/21/23  0820 10/21/23  0644 10/21/23  0129 10/20/23  1642   NA  --   --   --   --   --   --   --   --  134*  --  135   POTASSIUM  --   --   --   --  4.1  --  4.9  --  4.1  --  4.8   CHLORIDE  --   --   --   --   --   --   --   --  102  --  102   CO2  --   --   --   --   --   --   --   --  20*  --  21*   ANIONGAP  --   --   --   --   --   --   --   --  12  --  12   * 162* 154*   < >  --    < >  --    < > 188*   < > 105*   BUN  --   --   --   --   --   --   --   --  14.1  --  13.9   CR  --   --   --   --   --   --   --   --  0.86  --  0.78   GFRESTIMATED  --   --   --   --   --   --   --   --  70  --  79   SERA  --   --   --   --   --   --   --    "--  9.2  --  9.7   MAG  --   --   --   --  2.1  --  2.1  --  1.7  --  2.0   PROTTOTAL  --   --   --   --   --   --   --   --   --   --  7.6   ALBUMIN  --   --   --   --   --   --   --   --   --   --  3.9   BILITOTAL  --   --   --   --   --   --   --   --   --   --  <0.2   ALKPHOS  --   --   --   --   --   --   --   --   --   --  86   AST  --   --   --   --   --   --   --   --   --   --  23   ALT  --   --   --   --   --   --   --   --   --   --  14    < > = values in this interval not displayed.     Recent Labs   Lab 10/20/23  1642   *     No results for input(s): \"COLOR\", \"APPEARANCE\", \"URINEGLC\", \"URINEBILI\", \"URINEKETONE\", \"SG\", \"UBLD\", \"URINEPH\", \"PROTEIN\", \"UROBILINOGEN\", \"NITRITE\", \"LEUKEST\", \"RBCU\", \"WBCU\" in the last 168 hours.    IMAGING RESULTS     No results found for this or any previous visit (from the past 24 hour(s)).    _____________________________________________________________________________      _____________________________________________________________________________          ASSESSMENT     Severe headache for 8 days- No significant relief yet. DDX- Migraine vs temporal arteritis vs cervicogenic headache.  Brisk reflexes with falls- Probable cervical radiculopathy  Cognitive decline- Mild cognitive impairment  Probable RLS        RECOMMENDATIONS   CT cervical spine- r/o cervical myelopathy/cervical stenosis  OT to perform cognitive assessment  Prednisone may help the acute headache. PT for neck. Evaluate significant cervical disc disease.  Continue gabapentin             "

## 2023-10-23 NOTE — PLAN OF CARE
Goal Outcome Evaluation:             Shift: 11pm-7am    Vitals: Temp: 97.8  F (36.6  C) Temp src: Oral BP: (!) 166/87 Pulse: 98   Resp: 18 SpO2: 94 % O2 Device: None (Room air)       Orientation: alert, with some forgetfulness   Pain: complains of neck pain, ice pack given  Tele: --  Activity: Assist of 1 w/ gb and walker  Resp: coarse lung sounds, on RA  Diet: NPO  How to take meds: whole w/ fluids  GI & : up to toilet, no bm this shift   Protocol: potassium and magnesium   BG: ACHS- 144, 148  Skin: scattered bruising   Lines: IV on left hand  Other: nicotine patch on right arm   Plan: Biopsy @950am

## 2023-10-23 NOTE — PHARMACY-ANTICOAGULATION SERVICE
Clinical Pharmacy - Warfarin Dosing Consult     Pharmacy has been consulted to manage this patient s warfarin therapy.  Indication: Atrial Fibrillation  Therapy Goal: INR 2-3  Warfarin Prior to Admission: Yes  Warfarin PTA Regimen: 5 mg MWF and 10 mg ROW,  Significant drug interactions: meloxicam, prednisone  Recent documented change in oral intake/nutrition: Unknown  Dose Comments: INR 1.47.Missed warfarin for 3 days. Dose 7.5 mg today    INR   Date Value Ref Range Status   10/23/2023 1.47 (H) 0.85 - 1.15 Final   10/22/2023 1.52 (H) 0.85 - 1.15 Final       Recommend warfarin 7.5 mg today.  Pharmacy will monitor Grace Jorgensen daily and order warfarin doses to achieve specified goal.      Please contact pharmacy as soon as possible if the warfarin needs to be held for a procedure or if the warfarin goals change.

## 2023-10-23 NOTE — PLAN OF CARE
Goal Outcome Evaluation:      Plan of Care Reviewed With: patient    Overall Patient Progress: no changeOverall Patient Progress: no change     C/o headache, R ear and neck pain. Patient stated oxycodone did not help with pain, MD notified, Dilaudid ordered and given x2, patient stated pain down from 10/10 to 7/10. Continue to monitor.

## 2023-10-24 ENCOUNTER — APPOINTMENT (OUTPATIENT)
Dept: OCCUPATIONAL THERAPY | Facility: CLINIC | Age: 73
DRG: 545 | End: 2023-10-24
Attending: INTERNAL MEDICINE
Payer: COMMERCIAL

## 2023-10-24 LAB
CREAT SERPL-MCNC: 0.83 MG/DL (ref 0.51–0.95)
EGFRCR SERPLBLD CKD-EPI 2021: 74 ML/MIN/1.73M2
GLUCOSE BLDC GLUCOMTR-MCNC: 113 MG/DL (ref 70–99)
GLUCOSE BLDC GLUCOMTR-MCNC: 130 MG/DL (ref 70–99)
GLUCOSE BLDC GLUCOMTR-MCNC: 133 MG/DL (ref 70–99)
GLUCOSE BLDC GLUCOMTR-MCNC: 150 MG/DL (ref 70–99)
GLUCOSE BLDC GLUCOMTR-MCNC: 155 MG/DL (ref 70–99)
INR PPP: 1.44 (ref 0.85–1.15)
MAGNESIUM SERPL-MCNC: 2.4 MG/DL (ref 1.7–2.3)
POTASSIUM SERPL-SCNC: 4.2 MMOL/L (ref 3.4–5.3)

## 2023-10-24 PROCEDURE — 84132 ASSAY OF SERUM POTASSIUM: CPT | Performed by: INTERNAL MEDICINE

## 2023-10-24 PROCEDURE — 36415 COLL VENOUS BLD VENIPUNCTURE: CPT | Performed by: INTERNAL MEDICINE

## 2023-10-24 PROCEDURE — 97535 SELF CARE MNGMENT TRAINING: CPT | Mod: GO

## 2023-10-24 PROCEDURE — 83735 ASSAY OF MAGNESIUM: CPT | Performed by: INTERNAL MEDICINE

## 2023-10-24 PROCEDURE — 99232 SBSQ HOSP IP/OBS MODERATE 35: CPT | Performed by: INTERNAL MEDICINE

## 2023-10-24 PROCEDURE — 250N000012 HC RX MED GY IP 250 OP 636 PS 637: Performed by: INTERNAL MEDICINE

## 2023-10-24 PROCEDURE — 82565 ASSAY OF CREATININE: CPT | Performed by: INTERNAL MEDICINE

## 2023-10-24 PROCEDURE — 97165 OT EVAL LOW COMPLEX 30 MIN: CPT | Mod: GO

## 2023-10-24 PROCEDURE — 250N000013 HC RX MED GY IP 250 OP 250 PS 637: Performed by: PSYCHIATRY & NEUROLOGY

## 2023-10-24 PROCEDURE — 85610 PROTHROMBIN TIME: CPT | Performed by: INTERNAL MEDICINE

## 2023-10-24 PROCEDURE — 120N000001 HC R&B MED SURG/OB

## 2023-10-24 PROCEDURE — 250N000013 HC RX MED GY IP 250 OP 250 PS 637: Performed by: INTERNAL MEDICINE

## 2023-10-24 RX ORDER — DULOXETIN HYDROCHLORIDE 30 MG/1
30 CAPSULE, DELAYED RELEASE ORAL DAILY
Status: DISCONTINUED | OUTPATIENT
Start: 2023-10-25 | End: 2023-10-25 | Stop reason: HOSPADM

## 2023-10-24 RX ORDER — WARFARIN SODIUM 5 MG/1
10 TABLET ORAL
Status: COMPLETED | OUTPATIENT
Start: 2023-10-24 | End: 2023-10-24

## 2023-10-24 RX ORDER — DONEPEZIL HYDROCHLORIDE 5 MG/1
5 TABLET, FILM COATED ORAL AT BEDTIME
Status: DISCONTINUED | OUTPATIENT
Start: 2023-10-24 | End: 2023-10-25 | Stop reason: HOSPADM

## 2023-10-24 RX ADMIN — METFORMIN HYDROCHLORIDE 500 MG: 500 TABLET ORAL at 08:45

## 2023-10-24 RX ADMIN — CLONAZEPAM 0.5 MG: 0.5 TABLET ORAL at 21:09

## 2023-10-24 RX ADMIN — LISINOPRIL 40 MG: 40 TABLET ORAL at 08:44

## 2023-10-24 RX ADMIN — PANTOPRAZOLE SODIUM 40 MG: 40 TABLET, DELAYED RELEASE ORAL at 08:45

## 2023-10-24 RX ADMIN — AMLODIPINE BESYLATE 10 MG: 10 TABLET ORAL at 08:45

## 2023-10-24 RX ADMIN — DONEPEZIL HYDROCHLORIDE 5 MG: 5 TABLET ORAL at 22:51

## 2023-10-24 RX ADMIN — CLONAZEPAM 0.5 MG: 0.5 TABLET ORAL at 08:46

## 2023-10-24 RX ADMIN — PREDNISONE 60 MG: 20 TABLET ORAL at 08:43

## 2023-10-24 RX ADMIN — GABAPENTIN 300 MG: 300 CAPSULE ORAL at 08:45

## 2023-10-24 RX ADMIN — GABAPENTIN 300 MG: 300 CAPSULE ORAL at 21:09

## 2023-10-24 RX ADMIN — ATORVASTATIN CALCIUM 20 MG: 20 TABLET, FILM COATED ORAL at 08:45

## 2023-10-24 RX ADMIN — NICOTINE 1 PATCH: 21 PATCH, EXTENDED RELEASE TRANSDERMAL at 01:43

## 2023-10-24 RX ADMIN — WARFARIN SODIUM 10 MG: 5 TABLET ORAL at 18:00

## 2023-10-24 RX ADMIN — GABAPENTIN 300 MG: 300 CAPSULE ORAL at 16:09

## 2023-10-24 RX ADMIN — MELOXICAM 15 MG: 7.5 TABLET ORAL at 08:44

## 2023-10-24 RX ADMIN — CITALOPRAM HYDROBROMIDE 40 MG: 20 TABLET ORAL at 08:44

## 2023-10-24 RX ADMIN — PANTOPRAZOLE SODIUM 40 MG: 40 TABLET, DELAYED RELEASE ORAL at 16:09

## 2023-10-24 RX ADMIN — MULTIPLE VITAMINS W/ MINERALS TAB 1 TABLET: TAB at 08:46

## 2023-10-24 ASSESSMENT — ACTIVITIES OF DAILY LIVING (ADL)
ADLS_ACUITY_SCORE: 32
PREVIOUS_RESPONSIBILITIES: MEAL PREP;MEDICATION MANAGEMENT
ADLS_ACUITY_SCORE: 32

## 2023-10-24 NOTE — PLAN OF CARE
Goal Outcome Evaluation:      Plan of Care Reviewed With: patient    Overall Patient Progress: improvingOverall Patient Progress: improving     VS: VSS on RA.  Pain: shoulder +neck pain.   Lines: NO IV access MD aware.   Cognitive / Neruo: Aox4. Neuro intact.   Respiratory: LS: clear. Denies SOB.  Cardiac: Denies CP. Tele: SR.   Peripheral Neurovascular: No edema. Denies numbness, tingling, and tenderness. Pulses 2+.   GI: Last BM: ___. Denies N/V.   : Voids spontaneously.   Skin: WDL. See flowsheet for skin assessment.   Diet: Regular.   Activity: Assist of 1.   Labs: BS- 130,137.  K+mag.   Plan: Continue w/ POC.      Pt states shoulder and neck pain. Ice pack given. Cervical spine CT without contrast. Safety maintained. Possible discharge.

## 2023-10-24 NOTE — PROGRESS NOTES
10/24/23 1136   Appointment Info   Signing Clinician's Name / Credentials (OT) Zabrina Moulton, OTR/L   Living Environment   People in Home child(george), adult   Current Living Arrangements house   Home Accessibility stairs to enter home   Number of Stairs, Main Entrance 2   Living Environment Comments Pt lives with adult son in house, ~2 STEPHANIE without railing, all needs met on main level, tub/shower (reports she has not taken a shower for 2 years, sponge bathes at kitchen sink), standard height toilet w/ grab bar.   Self-Care   Usual Activity Tolerance good   Current Activity Tolerance moderate   Equipment Currently Used at Home walker, rolling   Fall history within last six months yes   Number of times patient has fallen within last six months 1   Activity/Exercise/Self-Care Comment Pt reports indep in all ADLs, light homemaking tasks, cooking and mobility tasks with no AD in the house, walker in the community. Son works outside of the house 7-4.   Instrumental Activities of Daily Living (IADL)   Previous Responsibilities meal prep;medication management   General Information   Onset of Illness/Injury or Date of Surgery 10/20/23   Referring Physician Ramses Preston MD   Patient/Family Therapy Goal Statement (OT) Pt's goal is to d/c home   Additional Occupational Profile Info/Pertinent History of Current Problem Per chart: Pt is a 73 year old female admitted with severe headache on the right side of her head; OT consulted for cognition   Existing Precautions/Restrictions fall   Cognitive Status Examination   Orientation Status orientation to person, place and time   Cognitive Screens/Assessments   Cognitive Assessments Completed (S)  [facility] Mental Status Exam (UMS):  Total Score out of /30 (S)  23/30   [facility] Norms (S)  21-26 equals mild neurocognitive disorder   SLUMS Domains assessed: (S)  orientation, memory, attention, executive functions   SLUMS Interpretation (S)  [facility] cognitive screen  completed with pt scoring 23/30 (21-26 = mild cognitive impairment) indicating probable mild deficit. Pt with difficulties in STM/recall (3/5 words, 3/4 story questions), math/money calculation and reverse/auditory memory.   Visual Perception   Visual Impairment/Limitations corrective lenses full-time   Sensory   Sensory Quick Adds sensation intact   Pain Assessment   Patient Currently in Pain Yes, see Vital Sign flowsheet  (head/neck pain)   Range of Motion Comprehensive   Comment, General Range of Motion Limited L shoulder ROM due to previous injury, RUE WFL   Strength Comprehensive (MMT)   Comment, General Manual Muscle Testing (MMT) Assessment Generalized weakness   Transfers   Transfer Comments Declines - has been mobilizing with nursing in room, to/from    Clinical Impression   Criteria for Skilled Therapeutic Interventions Met (OT) Yes, treatment indicated   OT Diagnosis Impaired IADLs, cognition   OT Problem List-Impairments impacting ADL problems related to;activity tolerance impaired;cognition;pain   ADL comments/analysis Pt near [facility]   Assessment of Occupational Performance 1-3 Performance Deficits   Identified Performance Deficits Cognition, IADLs   Planned Therapy Interventions (OT) ADL retraining;IADL retraining;cognition   Clinical Decision Making Complexity (OT) problem focused assessment/low complexity   Risk & Benefits of therapy have been explained evaluation/treatment results reviewed;care plan/treatment goals reviewed;risks/benefits reviewed;current/potential barriers reviewed;participants voiced agreement with care plan;participants included;patient   OT Total Evaluation Time   OT Eval, Low Complexity Minutes (85580) 20   OT Goals   Therapy Frequency (OT) One time eval and treatment   OT Predicted Duration/Target Date for Goal Attainment 10/24/23   OT Goals Cognition   OT: Cognitive Patient/caregiver will verbalize understanding of cognitive assessment results/recommendations as needed  for safe discharge planning   OT Discharge Planning   OT Discharge Recommendation (DC Rec) home with assist;home with outpatient occupational therapy   OT Rationale for DC Rec Pt near baseline, endorses mild difficulty with memory, does not feel overtly different than prior to admission. Recommend OP OT for more indepth/formal cognitive assessment as warranted. Would benefit from use of pill organizer for med mgmt in home environment.   OT Brief overview of current status SLUMS 23/30   Total Session Time   Total Session Time (sum of timed and untimed services) 20

## 2023-10-24 NOTE — PLAN OF CARE
Occupational Therapy Discharge Summary    Reason for therapy discharge:    All goals and outcomes met, no further needs identified.    Progress towards therapy goal(s). See goals on Care Plan in [facility] electronic health record for goal details.  Goals met    Therapy recommendation(s):    Continued therapy is recommended.  Rationale/Recommendations:  Recommend OP OT for more formal cognitive assessment as needed. Discussed strategies for memory compensation, including use of a pill organizer.

## 2023-10-24 NOTE — PLAN OF CARE
"Goal Outcome Evaluation:      Plan of Care Reviewed With: patient    VS as follows: BP (!) 163/79 (BP Location: Right arm)   Pulse 89   Temp 98  F (36.7  C) (Oral)   Resp 20   Ht 1.626 m (5' 4\")   Wt 78.3 kg (172 lb 9.6 oz)   SpO2 95%   BMI 29.63 kg/m       A & O x 4, denies pain, does c/o of legs jumpy she does have a hx of RLS. Was seen by neurology, see orders. Evaluated by OT. No PIV as was painful when flushed, provider notified and ok to not restart. Discharge pending home with son, when medically cleared.                  "

## 2023-10-24 NOTE — PROVIDER NOTIFICATION
Pt denied to have an IV placed stating she is leaving today. MD aware. Agreed to stay with no IV access.

## 2023-10-24 NOTE — PROGRESS NOTES
"[facility]  Neurology Progress Note               Interval History:     Patient c/o 4-5/10 right sided neck pain. No Significant headache or face pain.  SLUMS score of 23/30 - Mild cognitive impairment.  CT neck- multi level neural foraminal narrowing.              Medications:   I have reviewed this patient's current medications               Physical Exam:   Blood pressure (!) 163/79, pulse 89, temperature 98  F (36.7  C), temperature source Oral, resp. rate 20, height 1.626 m (5' 4\"), weight 78.3 kg (172 lb 9.6 oz), SpO2 95%.             Data:   Recent labs, imaging, and other studies were reviewed.         Lab Results   Component Value Date     10/21/2023     03/27/2018    Lab Results   Component Value Date    CHLORIDE 102 10/21/2023    CHLORIDE 102 11/09/2022    CHLORIDE 100 03/27/2018    Lab Results   Component Value Date    BUN 14.1 10/21/2023    BUN 15 11/09/2022    BUN 12 03/27/2018      Lab Results   Component Value Date    POTASSIUM 4.2 10/24/2023    POTASSIUM 4.1 11/09/2022    POTASSIUM 3.3 03/27/2018    Lab Results   Component Value Date    CO2 20 10/21/2023    CO2 21 11/09/2022    CO2 24 03/27/2018    Lab Results   Component Value Date    CR 0.83 10/24/2023    CR 0.84 03/27/2018        Lab Results   Component Value Date    WBC 5.9 10/21/2023    HGB 9.6 (L) 10/21/2023    HCT 30.8 (L) 10/21/2023    MCV 82 10/21/2023     10/23/2023     Lab Results   Component Value Date    INR 1.44 (H) 10/24/2023            Assessment and Plan:     Neck pain- 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 Consider Cervical epidural steroid injections. PT for neck. Start Cymbalta 30 mg per day.  Mild Cognitive Impairment- Start patient on Aricept 5 mg per day. Strong FHX of Alzheimer's.  No signs of myelopathy on CT neck.       "

## 2023-10-24 NOTE — PROGRESS NOTES
Hospitalist Medicine Progress Note   [facility]       Grace Jorgensen is a 73 year old lady with HTN, T2DM, PAF paroxysmal atrial fibrillation on warfarin therapy, stroke, chronic dizziness came to [facility] 10/20/2023 with history of severe headache associated with blurred vision x 6 days        Date of Admission:  10/20/2023  Assessment & Plan     Possible temporal arteritis  Headache  Visual changes  Elevated ESR  Facial pain with headache?  Trigeminal neuralgia  -CT head in the ED shows no acute intracranial process.  -ED provider contacted rheumatologist on-call and was advised to start Solu-Medrol  mg for 3 days and to get temporal artery biopsy  -Started on IV steroids whch on 10/23/2023 which changed to prednisone 60 mg daily  -Appreciate general surgery consult for temporal artery biopsy but the patient refused to have the biopsy done  -Consult neurology as patient is not complaining of facial pains will rule out trigeminal neuralgia she insisted that she takes gabapentin before and when she was started on gabapentin 300 mg 3 times daily on 10/23/2023 her symptoms improved     Type 2 diabetes mellitus with history of polyneuropathy  -Prior to admission patient on metformin  -We will place patient on sliding scale given that patient will be on steroids we will need to closely monitor blood glucose  -Check hemoglobin A1c  -Diabetic diet  -Prior records show patient was on gabapentin (not on the med list) she was started on gabapentin 300 mg 3 times daily on 10/23/2023     Paroxysmal atrial fibrillation  -Not on any medication to control rate per med list  -Heart rate is in 80s and 90s  -On warfarin at baseline holding for possible biopsy.  This was resumed 10/23/2023     Hypertension  -Prior to admission patient on lisinopril and amlodipine we will continue  Blood pressure is somewhat elevated on treatment with IV steroids high-dose     Hyperlipidemia  -Continue  on statins     GERD  -Continue PPI now that patient is also on steroids     Depression  -Continue prior to admission meds     Tobacco use disorder  -Ordered nicotine patch            Plan:   Await OT evaluation before discharge       Diet: Moderate Consistent Carb (60 g CHO per Meal) Diet  Snacks/Supplements Adult: Ensure Max Protein (bariatric); With MealsDiabetic diet  DVT Prophylaxis: Warfarin  Waterman Catheter: Not present  Code Status: Full Code               The patient's care was discussed with the Patient and her son who came to visit her    I also discussed the patient plan of care with rheumatology who wanted her to be started on Mobic 30 mg daily as well as prednisone.     Ramses Preston MD  Hospitalist Service  [facility]    ______________________________________________________________________    Interval History     Symptoms   Patient says that her pain is 1much less than yesterday     Review of Systems:   No nausea or vomiting  She feels that her left eye vision is better than before    Data reviewed today: I reviewed all medications, new labs and imaging results over the last 24 hours.     Physical Exam   Vital Signs: Temp: 98  F (36.7  C) Temp src: Oral BP: (!) 163/79 Pulse: 89   Resp: 20 SpO2: 95 % O2 Device: None (Room air)    Weight: 172 lbs 9.6 oz      GENERAL: Patient is not in acute distress  HEENT: EOM+,Conjunctiva is clear   NECK:  no Jugular Venous distention  HEART: S1 S2 regular Rate and Rhythm, there is  no murmur,   LUNGS: Respirations are  not laboured, Lungs are  clear to auscultation without Crepitations or Wheezing   ABDOMEN: Soft , there is no tenderness ,Bowel Sounds are  Positive   LOWER LIMBS: no  Pedal Edema  Bilaterally   CNS:  Alert,  Oriented x 3, Moving all the Four Limbs     Data   Recent Labs   Lab 10/24/23  1703 10/24/23  1220 10/24/23  0805 10/24/23  0750 10/23/23  1752 10/23/23  1458 10/23/23  0633 10/23/23  0557 10/22/23  1208 10/22/23  1155  10/22/23  0845 10/22/23  0641 10/21/23  0820 10/21/23  0644 10/21/23  0129 10/20/23  1642   WBC  --   --   --   --   --   --   --   --   --   --   --   --   --  5.9  --  7.9   HGB  --   --   --   --   --   --   --   --   --   --   --   --   --  9.6*  --  9.7*   MCV  --   --   --   --   --   --   --   --   --   --   --   --   --  82  --  84   PLT  --   --   --   --   --   --   --  416  --   --   --   --   --  390  --  429   INR  --   --   --  1.44*  --  1.47*  --   --   --  1.52*  --   --   --  2.20*  --  2.40*   NA  --   --   --   --   --   --   --   --   --   --   --   --   --  134*  --  135   POTASSIUM  --   --   --  4.2  --   --   --  4.1  --   --   --  4.9  --  4.1  --  4.8   CHLORIDE  --   --   --   --   --   --   --   --   --   --   --   --   --  102  --  102   CO2  --   --   --   --   --   --   --   --   --   --   --   --   --  20*  --  21*   BUN  --   --   --   --   --   --   --   --   --   --   --   --   --  14.1  --  13.9   CR  --   --   --  0.83  --   --   --   --   --   --   --   --   --  0.86  --  0.78   ANIONGAP  --   --   --   --   --   --   --   --   --   --   --   --   --  12  --  12   SERA  --   --   --   --   --   --   --   --   --   --   --   --   --  9.2  --  9.7   * 133* 113*  --    < >  --    < >  --    < >  --    < >  --    < > 188*   < > 105*   ALBUMIN  --   --   --   --   --   --   --   --   --   --   --   --   --   --   --  3.9   PROTTOTAL  --   --   --   --   --   --   --   --   --   --   --   --   --   --   --  7.6   BILITOTAL  --   --   --   --   --   --   --   --   --   --   --   --   --   --   --  <0.2   ALKPHOS  --   --   --   --   --   --   --   --   --   --   --   --   --   --   --  86   ALT  --   --   --   --   --   --   --   --   --   --   --   --   --   --   --  14   AST  --   --   --   --   --   --   --   --   --   --   --   --   --   --   --  23    < > = values in this interval not displayed.         Recent Results (from the past 24 hour(s))   CT Cervical Spine w/o  Contrast    Narrative    EXAM: CT CERVICAL SPINE W/O CONTRAST  LOCATION: [facility]  DATE: 10/23/2023    INDICATION: Signs of myelopathy, neck pain with headache  COMPARISON: None available  TECHNIQUE: Routine CT Cervical Spine without IV contrast. Multiplanar reformats. Dose reduction techniques were used.    FINDINGS:  VERTEBRA: Normal vertebral body heights. Reversal the normal cervical lordosis.] C4 on C5. There is moderate loss of disc height at C5-C6 and C6-C7 with endplate osteophyte formation. No fracture or posttraumatic subluxation.     CANAL/FORAMINA:   C2-C3: Severe right facet arthropathy. Mild spinal canal stenosis.   C3-C4: Ankylosis of the facet joints.   C4-C5: Ankylosis of the facet joints. Mild to moderate left neural foraminal stenosis.  C5-C6: Moderate bilateral neural foraminal stenosis. Mild spinal canal stenosis.  C6-C7: Mild to moderate bilateral neural foraminal stenosis.    PARASPINAL: No extraspinal abnormality.      Impression    IMPRESSION:  1.  No acute fracture.    2.  No high-grade spinal canal stenosis.    3.  At C5-C6, there is moderate bilateral neural foraminal stenosis.

## 2023-10-24 NOTE — PLAN OF CARE
"Goal Outcome Evaluation:      Plan of Care Reviewed With: patient      Pt A&O x 4. Anxious. VSS. BP slightly elevated. Assist x 1 with gait belt and walker. On room air. LS clear and diminished. BD+. Obese. PIV SL. C/o headache, neck pain, blurred vision and restless leg. Provider notifiedBG 146. Declined sliding scale      BP (!) 166/79 (BP Location: Right arm, Patient Position: Semi-Damon's, Cuff Size: Adult Regular)   Pulse 89   Temp 97.8  F (36.6  C) (Oral)   Resp 18   Ht 1.626 m (5' 4\")   Wt 78.3 kg (172 lb 9.6 oz)   SpO2 93%   BMI 29.63 kg/m      Possible discharge tomorrow.         "

## 2023-10-25 VITALS
BODY MASS INDEX: 29.47 KG/M2 | WEIGHT: 172.6 LBS | OXYGEN SATURATION: 95 % | SYSTOLIC BLOOD PRESSURE: 151 MMHG | DIASTOLIC BLOOD PRESSURE: 80 MMHG | HEART RATE: 78 BPM | HEIGHT: 64 IN | RESPIRATION RATE: 18 BRPM | TEMPERATURE: 97.6 F

## 2023-10-25 LAB
GLUCOSE BLDC GLUCOMTR-MCNC: 109 MG/DL (ref 70–99)
GLUCOSE BLDC GLUCOMTR-MCNC: 122 MG/DL (ref 70–99)
GLUCOSE BLDC GLUCOMTR-MCNC: 124 MG/DL (ref 70–99)
INR PPP: 1.86 (ref 0.85–1.15)
MAGNESIUM SERPL-MCNC: 2.2 MG/DL (ref 1.7–2.3)
POTASSIUM SERPL-SCNC: 3.9 MMOL/L (ref 3.4–5.3)

## 2023-10-25 PROCEDURE — 250N000013 HC RX MED GY IP 250 OP 250 PS 637: Performed by: INTERNAL MEDICINE

## 2023-10-25 PROCEDURE — 250N000012 HC RX MED GY IP 250 OP 636 PS 637: Performed by: INTERNAL MEDICINE

## 2023-10-25 PROCEDURE — 99239 HOSP IP/OBS DSCHRG MGMT >30: CPT | Performed by: INTERNAL MEDICINE

## 2023-10-25 PROCEDURE — 36415 COLL VENOUS BLD VENIPUNCTURE: CPT | Performed by: INTERNAL MEDICINE

## 2023-10-25 PROCEDURE — 85610 PROTHROMBIN TIME: CPT | Performed by: INTERNAL MEDICINE

## 2023-10-25 PROCEDURE — 84132 ASSAY OF SERUM POTASSIUM: CPT | Performed by: INTERNAL MEDICINE

## 2023-10-25 PROCEDURE — 250N000013 HC RX MED GY IP 250 OP 250 PS 637: Performed by: PSYCHIATRY & NEUROLOGY

## 2023-10-25 PROCEDURE — 83735 ASSAY OF MAGNESIUM: CPT | Performed by: INTERNAL MEDICINE

## 2023-10-25 RX ORDER — GABAPENTIN 100 MG/1
100 CAPSULE ORAL 3 TIMES DAILY
Qty: 90 CAPSULE | Refills: 0 | Status: SHIPPED | OUTPATIENT
Start: 2023-10-25 | End: 2024-01-22

## 2023-10-25 RX ORDER — PREDNISONE 20 MG/1
TABLET ORAL
Qty: 50 TABLET | Refills: 0 | Status: SHIPPED | OUTPATIENT
Start: 2023-10-26 | End: 2024-01-22

## 2023-10-25 RX ORDER — DULOXETIN HYDROCHLORIDE 30 MG/1
30 CAPSULE, DELAYED RELEASE ORAL DAILY
Qty: 30 CAPSULE | Refills: 0 | Status: ON HOLD | OUTPATIENT
Start: 2023-10-26 | End: 2024-01-22

## 2023-10-25 RX ORDER — MULTIPLE VITAMINS W/ MINERALS TAB 9MG-400MCG
1 TAB ORAL DAILY
COMMUNITY
Start: 2023-10-26

## 2023-10-25 RX ORDER — DONEPEZIL HYDROCHLORIDE 5 MG/1
5 TABLET, FILM COATED ORAL AT BEDTIME
Qty: 30 TABLET | Refills: 0 | Status: SHIPPED | OUTPATIENT
Start: 2023-10-25 | End: 2023-11-24

## 2023-10-25 RX ORDER — MELOXICAM 15 MG/1
15 TABLET ORAL DAILY
Qty: 30 TABLET | Refills: 0 | Status: ON HOLD | OUTPATIENT
Start: 2023-10-26 | End: 2024-01-22

## 2023-10-25 RX ORDER — AMLODIPINE BESYLATE 10 MG/1
10 TABLET ORAL DAILY
Start: 2023-10-26 | End: 2024-01-22

## 2023-10-25 RX ADMIN — DULOXETINE HYDROCHLORIDE 30 MG: 30 CAPSULE, DELAYED RELEASE PELLETS ORAL at 09:06

## 2023-10-25 RX ADMIN — ATORVASTATIN CALCIUM 20 MG: 20 TABLET, FILM COATED ORAL at 09:06

## 2023-10-25 RX ADMIN — PREDNISONE 60 MG: 20 TABLET ORAL at 09:06

## 2023-10-25 RX ADMIN — Medication 5 MG: at 01:26

## 2023-10-25 RX ADMIN — NICOTINE 1 PATCH: 21 PATCH, EXTENDED RELEASE TRANSDERMAL at 01:24

## 2023-10-25 RX ADMIN — CITALOPRAM HYDROBROMIDE 40 MG: 20 TABLET ORAL at 09:06

## 2023-10-25 RX ADMIN — LISINOPRIL 40 MG: 40 TABLET ORAL at 09:05

## 2023-10-25 RX ADMIN — PANTOPRAZOLE SODIUM 40 MG: 40 TABLET, DELAYED RELEASE ORAL at 09:05

## 2023-10-25 RX ADMIN — MELOXICAM 15 MG: 7.5 TABLET ORAL at 09:06

## 2023-10-25 RX ADMIN — CLONAZEPAM 0.5 MG: 0.5 TABLET ORAL at 09:05

## 2023-10-25 RX ADMIN — AMLODIPINE BESYLATE 10 MG: 10 TABLET ORAL at 09:05

## 2023-10-25 RX ADMIN — MULTIPLE VITAMINS W/ MINERALS TAB 1 TABLET: TAB at 09:05

## 2023-10-25 RX ADMIN — GABAPENTIN 300 MG: 300 CAPSULE ORAL at 09:05

## 2023-10-25 RX ADMIN — METFORMIN HYDROCHLORIDE 500 MG: 500 TABLET ORAL at 11:10

## 2023-10-25 ASSESSMENT — ACTIVITIES OF DAILY LIVING (ADL)
ADLS_ACUITY_SCORE: 32
ADLS_ACUITY_SCORE: 31
ADLS_ACUITY_SCORE: 32
ADLS_ACUITY_SCORE: 31
ADLS_ACUITY_SCORE: 32
ADLS_ACUITY_SCORE: 32
ADLS_ACUITY_SCORE: 31
ADLS_ACUITY_SCORE: 32
ADLS_ACUITY_SCORE: 31

## 2023-10-25 NOTE — PLAN OF CARE
Goal Outcome Evaluation:    Plan of Care Reviewed With: patient    Overall Patient Progress: improving    A&Ox4, forgetful. Complains of nerve pain, gave scheduled gabapentin. A1 w/ walker and gb. LS: clear. No tele. K and Mg protocols, recheck in AM. B/122. Nicotine patch in place on R shoulder. Plan to discharge home with son today.    Toward end of shift, pt more somnolent. Informed MD. MD going to look at pt's meds. Pt still hopeful for discharge today.

## 2023-10-25 NOTE — PHARMACY-ANTICOAGULATION SERVICE
Clinical Pharmacy- Warfarin Discharge Note  This patient is currently on warfarin for the treatment of Atrial fibrillation.  INR Goal= 2-3.    Warfarin PTA Regimen: 5 mg MWF and 10 mg ROW    Anticoagulation Dose History  More data exists         Latest Ref Rng & Units 9/29/2023 10/20/2023 10/21/2023 10/22/2023 10/23/2023 10/24/2023 10/25/2023   Recent Dosing and Labs   warfarin ANTICOAGULANT (COUMADIN) tablet 7.5 mg - - - - - 7.5 mg, $Given - -   warfarin ANTICOAGULANT (COUMADIN) tablet 10 mg - - - - - - 10 mg, $Given -   INR 0.85 - 1.15 2.47  2.40  2.20  1.52  1.47  1.44  1.86        Vitamin K doses administered during the last 7 days: None   FFP administered during the last 7 days: None     Agree with discharging the patient on PTA warfarin regimen.    The patient should have an INR checked  within 1 week or as directed .    Olena Singleton, [facility]

## 2023-10-25 NOTE — DISCHARGE SUMMARY
"[facility]  Hospitalist Discharge Summary      Date of Admission:  10/20/2023  Date of Discharge:  10/25/2023  Discharging Provider: Ramses Preston MD  Discharge Service: Hospitalist Service    Discharge Diagnoses   Possible temporal arteritis  Headache  Visual changes  Elevated ESR  Facial pain with headache?  Trigeminal neuralgia  Type 2 diabetes mellitus with history of polyneuropathy     Clinically Significant Risk Factors     # Overweight: Estimated body mass index is 29.63 kg/m  as calculated from the following:    Height as of this encounter: 1.626 m (5' 4\").    Weight as of this encounter: 78.3 kg (172 lb 9.6 oz).  # Severe Malnutrition: based on nutrition assessment      Follow-ups Needed After Discharge   Follow-up Appointments     Follow-up and recommended labs and tests       Follow up with primary care provider, [facility],   within 7 days for hospital follow- up.  The following labs/tests are   recommended: BMP  Monitor for #1 dementia #2 cervicalgia #3 probable temporal arteritis.      Follow up with [place] rheumatology regarding probable temporal arteritis   in 1 to 2 weeks and decide on steroid therapy            Discharge Disposition   Discharged to home  Condition at discharge: Good    Hospital Course   Grace Jorgensen is a 73 year old lady with HTN, T2DM, PAF paroxysmal atrial fibrillation on warfarin therapy, stroke, chronic dizziness came to [facility] 10/20/2023 with history of severe headache associated with blurred vision x 6 days   She was evaluated by rheumatology who recommended that the patient should be given Solu-Medrol 500 mg IV daily x3 days and advised temporal artery biopsy.  Patient refused temporal artery biopsy.  She continued to have pain that got better with starting of gabapentin at 300 mg 3 times daily.  She told me that she was on 100 mg 3 times daily which helped the pain earlier.  There is no history of " trigeminal neuralgia.  Neurology was consulted again and this time her neck pain was thought to be due to chronic arthritic changes with ankylosis and neural foraminal narrowing.  Neurology recommended cervical epidural steroid injections and physical therapy of the neck and started Cymbalta 30 mg daily  A cognitive test was also recommended and patient scored 23 out of 30 on slums score indicative of mild cognitive impairment because of which a diagnosis of mild cognitive impairment was made and patient started on Aricept 5 mg daily with strong family history of Alzheimer's disease.  Patient is advised to follow-up with rheumatology who had also recommended steroids and Mobic for pain relief of probable temporal arteritis with elevated ESR of 102.   I discussed the plan of care with the patient's son and discharge patient home      Consultations This Hospital Stay   SURGERY GENERAL IP CONSULT  CARE MANAGEMENT / SOCIAL WORK IP CONSULT  NEUROLOGY IP CONSULT  PHARMACY TO DOSE WARFARIN  OCCUPATIONAL THERAPY ADULT IP CONSULT    Code Status   Full Code    Time Spent on this Encounter   I, Ramses Preston MD, personally saw the patient today and spent greater than 30 minutes discharging this patient.       Ramses Preston MD  [place] [address] [facility]  [address] [place] [address]  Phone: [phone]  Fax: [phone]  ______________________________________________________________________    Physical Exam   Vital Signs: Temp: 97.6  F (36.4  C) Temp src: Oral BP: (!) 151/80 Pulse: 78   Resp: 18 SpO2: 95 % O2 Device: None (Room air)    Weight: 172 lbs 9.6 oz  GENERAL: Patient is not in acute distress  HEENT: EOM+,Conjunctiva is clear   NECK:  no Jugular Venous distention  HEART: S1 S2 regular Rate and Rhythm, there is  no murmur,   LUNGS: Respirations are  not laboured, Lungs are  clear to auscultation without Crepitations or Wheezing   ABDOMEN: Soft , there is no tenderness ,Bowel Sounds  are  Positive   LOWER LIMBS: no  Pedal Edema  Bilaterally   CNS:  Alert,  Oriented x 3, Moving all the Four Limbs        Primary Care Physician   [facility]    Discharge Orders      Reason for your hospital stay    herb to [facility] 10/20/2023 with history of severe headache associated with blurred vision x 6 days     Follow-up and recommended labs and tests     Follow up with primary care provider, [facility], within 7 days for hospital follow- up.  The following labs/tests are recommended: BMP  Monitor for #1 dementia #2 cervicalgia #3 probable temporal arteritis.      Follow up with [place] rheumatology regarding probable temporal arteritis in 1 to 2 weeks and decide on steroid therapy     Activity    Your activity upon discharge: activity as tolerated     Diet    Follow this diet upon discharge: Orders Placed This Encounter      Snacks/Supplements Adult: Ensure Max Protein (bariatric); With Meals      Moderate Consistent Carb (60 g CHO per Meal) Diet       Significant Results and Procedures   Most Recent 3 CBC's:  Recent Labs   Lab Test 10/23/23  0557 10/21/23  0644 10/20/23  1642 09/29/23  1841   WBC  --  5.9 7.9 6.6   HGB  --  9.6* 9.7* 8.4*   MCV  --  82 84 84    390 429 371     Most Recent 3 BMP's:  Recent Labs   Lab Test 10/25/23  1439 10/25/23  0856 10/25/23  0609 10/25/23  0203 10/24/23  0805 10/24/23  0750 10/23/23  0633 10/23/23  0557 10/21/23  0820 10/21/23  0644 10/21/23  0129 10/20/23  1642 09/28/23  0515   NA  --   --   --   --   --   --   --   --   --  134*  --  135 136   POTASSIUM  --   --  3.9  --   --  4.2  --  4.1   < > 4.1  --  4.8 3.9   CHLORIDE  --   --   --   --   --   --   --   --   --  102  --  102 99   CO2  --   --   --   --   --   --   --   --   --  20*  --  21* 21*   BUN  --   --   --   --   --   --   --   --   --  14.1  --  13.9 15.0   CR  --   --   --   --   --  0.83  --   --   --  0.86  --  0.78 0.88   ANIONGAP  --    --   --   --   --   --   --   --   --  12  --  12 16*   SERA  --   --   --   --   --   --   --   --   --  9.2  --  9.7 9.0   * 109*  --  124*   < >  --    < >  --    < > 188*   < > 105* 128*    < > = values in this interval not displayed.     Most Recent 2 LFT's:  Recent Labs   Lab Test 10/20/23  1642   AST 23   ALT 14   ALKPHOS 86   BILITOTAL <0.2     Most Recent Urinalysis:No lab results found.  Most Recent ESR & CRP:  Recent Labs   Lab Test 10/20/23  1642   *   CRPI <3.00   ,   Results for orders placed or performed during the hospital encounter of 10/20/23   CT Cervical Spine w/o Contrast    Narrative    EXAM: CT CERVICAL SPINE W/O CONTRAST  LOCATION: [facility]  DATE: 10/23/2023    INDICATION: Signs of myelopathy, neck pain with headache  COMPARISON: None available  TECHNIQUE: Routine CT Cervical Spine without IV contrast. Multiplanar reformats. Dose reduction techniques were used.    FINDINGS:  VERTEBRA: Normal vertebral body heights. Reversal the normal cervical lordosis.] C4 on C5. There is moderate loss of disc height at C5-C6 and C6-C7 with endplate osteophyte formation. No fracture or posttraumatic subluxation.     CANAL/FORAMINA:   C2-C3: Severe right facet arthropathy. Mild spinal canal stenosis.   C3-C4: Ankylosis of the facet joints.   C4-C5: Ankylosis of the facet joints. Mild to moderate left neural foraminal stenosis.  C5-C6: Moderate bilateral neural foraminal stenosis. Mild spinal canal stenosis.  C6-C7: Mild to moderate bilateral neural foraminal stenosis.    PARASPINAL: No extraspinal abnormality.      Impression    IMPRESSION:  1.  No acute fracture.    2.  No high-grade spinal canal stenosis.    3.  At C5-C6, there is moderate bilateral neural foraminal stenosis.       Discharge Medications   Current Discharge Medication List        START taking these medications    Details   donepezil (ARICEPT) 5 MG tablet Take 1 tablet (5 mg) by mouth at bedtime for 30  days  Qty: 30 tablet, Refills: 0    Associated Diagnoses: Mild Alzheimer's dementia without behavioral disturbance, psychotic disturbance, mood disturbance, or anxiety, unspecified timing of dementia onset (H)      DULoxetine (CYMBALTA) 30 MG capsule Take 1 capsule (30 mg) by mouth daily for 30 days  Qty: 30 capsule, Refills: 0    Associated Diagnoses: Cervicalgia      gabapentin (NEURONTIN) 100 MG capsule Take 1 capsule (100 mg) by mouth 3 times daily for 30 days  Qty: 90 capsule, Refills: 0    Associated Diagnoses: Gastroparesis      meloxicam (MOBIC) 15 MG tablet Take 1 tablet (15 mg) by mouth daily for 30 days  Qty: 30 tablet, Refills: 0    Associated Diagnoses: Temporal arteritis (H)      multivitamin w/minerals (THERA-VIT-M) tablet Take 1 tablet by mouth daily    Associated Diagnoses: Iron deficiency anemia, unspecified iron deficiency anemia type      predniSONE (DELTASONE) 20 MG tablet 60 mg oral daily x 7 days then 50 mg daily x 7 days then 40 mg daily - to be decided by Rheumatology  Qty: 50 tablet, Refills: 0    Associated Diagnoses: Acute intractable headache, unspecified headache type; Temporal arteritis (H)           CONTINUE these medications which have CHANGED    Details   amLODIPine (NORVASC) 10 MG tablet Take 1 tablet (10 mg) by mouth daily    Associated Diagnoses: Primary hypertension           CONTINUE these medications which have NOT CHANGED    Details   atorvastatin (LIPITOR) 20 MG tablet Take 20 mg by mouth daily      citalopram (CELEXA) 40 MG tablet Take 40 mg by mouth daily      ketoconazole (NIZORAL) 2 % external shampoo Every two weeks prn      lisinopril (ZESTRIL) 40 MG tablet Take 40 mg by mouth daily      metFORMIN (GLUCOPHAGE) 500 MG tablet Take 500 mg by mouth daily (with breakfast)      pantoprazole (PROTONIX) 20 MG EC tablet Take 40 mg by mouth every evening      !! warfarin ANTICOAGULANT (COUMADIN) 5 MG tablet Take 10 mg by mouth daily Sunday, Tuesday, Thursday, Saturday      !!  warfarin ANTICOAGULANT (COUMADIN) 5 MG tablet Take 5 mg by mouth Every Monday, Wednesday, and Friday       !! - Potential duplicate medications found. Please discuss with provider.        Allergies   Allergies   Allergen Reactions    Bupropion GI Disturbance

## 2023-10-25 NOTE — PLAN OF CARE
Goal Outcome Evaluation:       Pt is A & O 4, forgetful,, up A x 1 walker/GB, c/o of neck and shoulder pain and states relief from scheduled Gabapentin,  , 124, no iv access, MG & K replacement, plan is Gabapentin TID, prednisone 60 mg daily, OT recs OP OT.

## 2023-10-25 NOTE — PROGRESS NOTES
Discharge Note    Patient discharged to home via private vehicle  accompanied by son.  IV: Discontinued  Prescriptions filled and given to patient/family. Patient also picked up home medications from inpatient pharmacy.  Belongings reviewed and sent with patient.   Home medications returned to patient: NA  Equipment sent with: patient, N/A.   patient verbalizes understanding of discharge instructions. AVS given to patient.  Additional education completed?  New medication changes reviewed with patient and son.

## 2024-01-22 ENCOUNTER — HOSPITAL ENCOUNTER (INPATIENT)
Facility: CLINIC | Age: 74
LOS: 3 days | Discharge: HOME | DRG: 378 | End: 2024-01-25
Attending: EMERGENCY MEDICINE | Admitting: HOSPITALIST
Payer: COMMERCIAL

## 2024-01-22 DIAGNOSIS — K92.1 GASTROINTESTINAL HEMORRHAGE WITH MELENA: Primary | ICD-10-CM

## 2024-01-22 DIAGNOSIS — R53.81 PHYSICAL DECONDITIONING: ICD-10-CM

## 2024-01-22 DIAGNOSIS — I10 HYPERTENSION, UNSPECIFIED TYPE: ICD-10-CM

## 2024-01-22 DIAGNOSIS — D64.9 ANEMIA, UNSPECIFIED TYPE: ICD-10-CM

## 2024-01-22 LAB
ABO/RH(D): NORMAL
ALBUMIN SERPL BCG-MCNC: 3.9 G/DL (ref 3.5–5.2)
ALP SERPL-CCNC: 59 U/L (ref 40–150)
ALT SERPL W P-5'-P-CCNC: 19 U/L (ref 0–50)
ANION GAP SERPL CALCULATED.3IONS-SCNC: 15 MMOL/L (ref 7–15)
ANTIBODY SCREEN: NEGATIVE
APTT PPP: 27 SECONDS (ref 22–38)
AST SERPL W P-5'-P-CCNC: 29 U/L (ref 0–45)
BASOPHILS # BLD AUTO: 0.1 10E3/UL (ref 0–0.2)
BASOPHILS NFR BLD AUTO: 1 %
BILIRUB SERPL-MCNC: 0.2 MG/DL
BLD PROD TYP BPU: NORMAL
BLOOD COMPONENT TYPE: NORMAL
BUN SERPL-MCNC: 15.6 MG/DL (ref 8–23)
CALCIUM SERPL-MCNC: 9.5 MG/DL (ref 8.8–10.2)
CHLORIDE SERPL-SCNC: 102 MMOL/L (ref 98–107)
CODING SYSTEM: NORMAL
CREAT SERPL-MCNC: 0.83 MG/DL (ref 0.51–0.95)
CROSSMATCH: NORMAL
DEPRECATED HCO3 PLAS-SCNC: 22 MMOL/L (ref 22–29)
EGFRCR SERPLBLD CKD-EPI 2021: 74 ML/MIN/1.73M2
EOSINOPHIL # BLD AUTO: 0 10E3/UL (ref 0–0.7)
EOSINOPHIL NFR BLD AUTO: 0 %
ERYTHROCYTE [DISTWIDTH] IN BLOOD BY AUTOMATED COUNT: 20.3 % (ref 10–15)
GLUCOSE SERPL-MCNC: 122 MG/DL (ref 70–99)
HCT VFR BLD AUTO: 25.2 % (ref 35–47)
HEMOCCULT STL QL: POSITIVE
HGB BLD-MCNC: 6.9 G/DL (ref 11.7–15.7)
HGB BLD-MCNC: 7.4 G/DL (ref 11.7–15.7)
HOLD SPECIMEN: NORMAL
IMM GRANULOCYTES # BLD: 0 10E3/UL
IMM GRANULOCYTES NFR BLD: 0 %
INR PPP: 1.66 (ref 0.85–1.15)
IRON BINDING CAPACITY (ROCHE): ABNORMAL
IRON SATN MFR SERPL: ABNORMAL %
IRON SERPL-MCNC: 13 UG/DL (ref 37–145)
ISSUE DATE AND TIME: NORMAL
LYMPHOCYTES # BLD AUTO: 3.1 10E3/UL (ref 0.8–5.3)
LYMPHOCYTES NFR BLD AUTO: 34 %
MCH RBC QN AUTO: 23.4 PG (ref 26.5–33)
MCHC RBC AUTO-ENTMCNC: 29.4 G/DL (ref 31.5–36.5)
MCV RBC AUTO: 80 FL (ref 78–100)
MONOCYTES # BLD AUTO: 0.8 10E3/UL (ref 0–1.3)
MONOCYTES NFR BLD AUTO: 9 %
NEUTROPHILS # BLD AUTO: 5.3 10E3/UL (ref 1.6–8.3)
NEUTROPHILS NFR BLD AUTO: 56 %
NRBC # BLD AUTO: 0 10E3/UL
NRBC BLD AUTO-RTO: 0 /100
PLATELET # BLD AUTO: 580 10E3/UL (ref 150–450)
POTASSIUM SERPL-SCNC: 4.1 MMOL/L (ref 3.4–5.3)
PROT SERPL-MCNC: 6.9 G/DL (ref 6.4–8.3)
RBC # BLD AUTO: 3.16 10E6/UL (ref 3.8–5.2)
SODIUM SERPL-SCNC: 139 MMOL/L (ref 135–145)
SPECIMEN EXPIRATION DATE: NORMAL
UNIT ABO/RH: NORMAL
UNIT NUMBER: NORMAL
UNIT STATUS: NORMAL
UNIT TYPE ISBT: 6200
WBC # BLD AUTO: 9.4 10E3/UL (ref 4–11)

## 2024-01-22 PROCEDURE — 83550 IRON BINDING TEST: CPT | Performed by: HOSPITALIST

## 2024-01-22 PROCEDURE — 120N000001 HC R&B MED SURG/OB

## 2024-01-22 PROCEDURE — 85730 THROMBOPLASTIN TIME PARTIAL: CPT | Performed by: HOSPITALIST

## 2024-01-22 PROCEDURE — C9113 INJ PANTOPRAZOLE SODIUM, VIA: HCPCS | Performed by: HOSPITALIST

## 2024-01-22 PROCEDURE — 85610 PROTHROMBIN TIME: CPT | Performed by: HOSPITALIST

## 2024-01-22 PROCEDURE — 999N000128 HC STATISTIC PERIPHERAL IV START W/O US GUIDANCE

## 2024-01-22 PROCEDURE — 82272 OCCULT BLD FECES 1-3 TESTS: CPT | Performed by: EMERGENCY MEDICINE

## 2024-01-22 PROCEDURE — 86900 BLOOD TYPING SEROLOGIC ABO: CPT | Performed by: EMERGENCY MEDICINE

## 2024-01-22 PROCEDURE — 82728 ASSAY OF FERRITIN: CPT | Performed by: HOSPITALIST

## 2024-01-22 PROCEDURE — 99285 EMERGENCY DEPT VISIT HI MDM: CPT

## 2024-01-22 PROCEDURE — 84466 ASSAY OF TRANSFERRIN: CPT | Performed by: HOSPITALIST

## 2024-01-22 PROCEDURE — 85018 HEMOGLOBIN: CPT | Performed by: HOSPITALIST

## 2024-01-22 PROCEDURE — 99222 1ST HOSP IP/OBS MODERATE 55: CPT | Performed by: HOSPITALIST

## 2024-01-22 PROCEDURE — 86923 COMPATIBILITY TEST ELECTRIC: CPT | Performed by: HOSPITALIST

## 2024-01-22 PROCEDURE — 85025 COMPLETE CBC W/AUTO DIFF WBC: CPT | Performed by: EMERGENCY MEDICINE

## 2024-01-22 PROCEDURE — 36415 COLL VENOUS BLD VENIPUNCTURE: CPT | Performed by: EMERGENCY MEDICINE

## 2024-01-22 PROCEDURE — 80053 COMPREHEN METABOLIC PANEL: CPT | Performed by: EMERGENCY MEDICINE

## 2024-01-22 PROCEDURE — G0378 HOSPITAL OBSERVATION PER HR: HCPCS

## 2024-01-22 PROCEDURE — 36415 COLL VENOUS BLD VENIPUNCTURE: CPT | Performed by: HOSPITALIST

## 2024-01-22 PROCEDURE — P9016 RBC LEUKOCYTES REDUCED: HCPCS | Performed by: HOSPITALIST

## 2024-01-22 PROCEDURE — 250N000011 HC RX IP 250 OP 636: Performed by: HOSPITALIST

## 2024-01-22 RX ORDER — POLYETHYLENE GLYCOL 3350 17 G/17G
17 POWDER, FOR SOLUTION ORAL 2 TIMES DAILY PRN
Status: DISCONTINUED | OUTPATIENT
Start: 2024-01-22 | End: 2024-01-25 | Stop reason: HOSPADM

## 2024-01-22 RX ORDER — ACETAMINOPHEN 325 MG/1
650 TABLET ORAL EVERY 4 HOURS PRN
Status: DISCONTINUED | OUTPATIENT
Start: 2024-01-22 | End: 2024-01-25 | Stop reason: HOSPADM

## 2024-01-22 RX ORDER — SODIUM CHLORIDE, SODIUM LACTATE, POTASSIUM CHLORIDE, CALCIUM CHLORIDE 600; 310; 30; 20 MG/100ML; MG/100ML; MG/100ML; MG/100ML
INJECTION, SOLUTION INTRAVENOUS CONTINUOUS
Status: DISCONTINUED | OUTPATIENT
Start: 2024-01-23 | End: 2024-01-23

## 2024-01-22 RX ORDER — ONDANSETRON 2 MG/ML
4 INJECTION INTRAMUSCULAR; INTRAVENOUS EVERY 6 HOURS PRN
Status: DISCONTINUED | OUTPATIENT
Start: 2024-01-22 | End: 2024-01-25 | Stop reason: HOSPADM

## 2024-01-22 RX ORDER — AMOXICILLIN 250 MG
2 CAPSULE ORAL 2 TIMES DAILY PRN
Status: DISCONTINUED | OUTPATIENT
Start: 2024-01-22 | End: 2024-01-25 | Stop reason: HOSPADM

## 2024-01-22 RX ORDER — GABAPENTIN 100 MG/1
200 CAPSULE ORAL 3 TIMES DAILY
COMMUNITY

## 2024-01-22 RX ORDER — AMOXICILLIN 250 MG
1 CAPSULE ORAL 2 TIMES DAILY PRN
Status: DISCONTINUED | OUTPATIENT
Start: 2024-01-22 | End: 2024-01-25 | Stop reason: HOSPADM

## 2024-01-22 RX ORDER — PROCHLORPERAZINE 25 MG
12.5 SUPPOSITORY, RECTAL RECTAL EVERY 12 HOURS PRN
Status: DISCONTINUED | OUTPATIENT
Start: 2024-01-22 | End: 2024-01-25 | Stop reason: HOSPADM

## 2024-01-22 RX ORDER — BISACODYL 10 MG
10 SUPPOSITORY, RECTAL RECTAL DAILY PRN
Status: DISCONTINUED | OUTPATIENT
Start: 2024-01-22 | End: 2024-01-25 | Stop reason: HOSPADM

## 2024-01-22 RX ORDER — LIDOCAINE 40 MG/G
CREAM TOPICAL
Status: DISCONTINUED | OUTPATIENT
Start: 2024-01-22 | End: 2024-01-25 | Stop reason: HOSPADM

## 2024-01-22 RX ORDER — CITALOPRAM HYDROBROMIDE 20 MG/1
40 TABLET ORAL DAILY
Status: DISCONTINUED | OUTPATIENT
Start: 2024-01-22 | End: 2024-01-25 | Stop reason: HOSPADM

## 2024-01-22 RX ORDER — ALBUTEROL SULFATE 90 UG/1
2 AEROSOL, METERED RESPIRATORY (INHALATION) EVERY 6 HOURS PRN
COMMUNITY

## 2024-01-22 RX ORDER — PROCHLORPERAZINE MALEATE 5 MG
5 TABLET ORAL EVERY 6 HOURS PRN
Status: DISCONTINUED | OUTPATIENT
Start: 2024-01-22 | End: 2024-01-25 | Stop reason: HOSPADM

## 2024-01-22 RX ORDER — ONDANSETRON 4 MG/1
4 TABLET, ORALLY DISINTEGRATING ORAL EVERY 6 HOURS PRN
Status: DISCONTINUED | OUTPATIENT
Start: 2024-01-22 | End: 2024-01-25 | Stop reason: HOSPADM

## 2024-01-22 RX ORDER — AMLODIPINE BESYLATE 5 MG/1
5 TABLET ORAL DAILY
Status: ON HOLD | COMMUNITY
End: 2024-01-25

## 2024-01-22 RX ORDER — ACETAMINOPHEN 650 MG/1
650 SUPPOSITORY RECTAL EVERY 4 HOURS PRN
Status: DISCONTINUED | OUTPATIENT
Start: 2024-01-22 | End: 2024-01-25 | Stop reason: HOSPADM

## 2024-01-22 RX ORDER — AMLODIPINE BESYLATE 5 MG/1
5 TABLET ORAL DAILY
Status: DISCONTINUED | OUTPATIENT
Start: 2024-01-23 | End: 2024-01-25 | Stop reason: HOSPADM

## 2024-01-22 RX ORDER — LISINOPRIL 40 MG/1
40 TABLET ORAL DAILY
Status: DISCONTINUED | OUTPATIENT
Start: 2024-01-23 | End: 2024-01-25 | Stop reason: HOSPADM

## 2024-01-22 RX ADMIN — PANTOPRAZOLE SODIUM 80 MG: 40 INJECTION, POWDER, FOR SOLUTION INTRAVENOUS at 20:55

## 2024-01-22 ASSESSMENT — ENCOUNTER SYMPTOMS
HEMATURIA: 0
ABDOMINAL PAIN: 0
BACK PAIN: 0
VOMITING: 0
DYSURIA: 0
SHORTNESS OF BREATH: 0
DIARRHEA: 0
HEADACHES: 0
RHINORRHEA: 0
NAUSEA: 1
COUGH: 1
DIZZINESS: 0
CHILLS: 0
NECK PAIN: 0
FEVER: 0

## 2024-01-22 ASSESSMENT — ACTIVITIES OF DAILY LIVING (ADL)
ADLS_ACUITY_SCORE: 35
ADLS_ACUITY_SCORE: 30
ADLS_ACUITY_SCORE: 30

## 2024-01-22 NOTE — H&P
[facility]    History and Physical - Hospitalist Service       Date of Admission:  1/22/2024    Assessment & Plan      Grace Jorgensen is a 73 year old female admitted on 1/22/2024. Past history of paroxysmal A-fib on warfarin, CVA, HTN, gastroparesis s/p gastric stimulator, hiatal hernia who presents with black stools.    Suspected acute on chronic anemia due to probable upper GIB  *presents with 2 black stools in past 5 days, admission hgb 7.4 down from 9.6 when last checked 10/21/23 (baseline hgb 9-10 g/dL)  *stopped taking warfarin 3 days prior to arrival, but admission INR 1.86  *denies alcohol or NSAID use  *no hx PUD per patient, has known hiatal hernia  *slightly tachycardic in ED but hypertensive    - serial hgb q6h  - repeat INR in AM  - conditional transfusion for hgb <7 g/dL (consented on admission)  - PPI IV bid  - check iron studies  - [facility] GI consult  - clear liquids, NPO midnight  - LR 50 ml/hr while NPO    ADDENDUM:  repeat hgb 6.9; will receive transfusion.  Will switch to inpatient status.    HTN  - continue PTA lisinopril and amlodipine with hold parameters once verified    Paroxysmal A-fib  Hx CVA with chronic dizziness  - hold warfarin as above  - hold PTA statin as Obs    DM II  *A1C 6.5% in November 2023  - hold PTA metformin; no need for POC glucose checks    Hx temporal arteritis vs trigeminal neuralgia  *hospitalized 10/2023, refused temporal artery biopsy  - continue PTA gabapentin once verified          Diet:  clear liquids, NPO midnight   DVT Prophylaxis: Low Risk/Ambulatory with no VTE prophylaxis indicated  Waterman Catheter: Not present  Lines: None     Cardiac Monitoring: None  Code Status:  Full code per patient     Clinically Significant Risk Factors Present on Admission               # Drug Induced Coagulation Defect: home medication list includes an anticoagulant medication    # Hypertension: Noted on problem list                 Disposition Plan       Expected Discharge Date: 01/23/2024                  Rahat King MD  Hospitalist Service  [facility]  Securely message with [employer] (more info)  Text page via [facility] Paging/Directory     ______________________________________________________________________    Chief Complaint   Black stools    History is obtained from the patient, chart review and discussion with ED provider.     History of Present Illness   Grace Jorgensen is a 73 year old female who presents with black stools.  She reports she has chronic constipation and has only about 2 bowel movements per week.  She noticed a black stool last Wednesday or Thursday followed by a repeat black stool yesterday.  These are the only bowel movements she has had in the past week.  She reports severe nausea over the past 1 week and reports she is scheduled to follow-up with her outpatient gastroenterologist Dr. Evita Rob at [facility] on Friday.  She denies any other abdominal pain, denies any heartburn, denies any alcohol or NSAID use.  She denies taking any iron supplements or other nutritional supplements for the past 2 weeks.  She denies any history of peptic ulcer disease.  She has had multiple EGDs in the past due to her history of gastroparesis and has a gastric stimulator in place.  She reports chronic dizziness which remains stable at baseline.  She also reports chronic dyspnea which remains stable at baseline.  She denies any new chest pain or pressure.  She reports she stopped taking her warfarin 3 nights ago due to concern that she was contributing to her bleeding.  Remainder review of systems otherwise negative.      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 Taking?   DULoxetine (CYMBALTA) 30 MG capsule   No No   Sig: Take 1 capsule (30 mg) by mouth daily for 30 days   amLODIPine (NORVASC) 5 MG tablet Past Month at Issues with tablet size  Yes Yes   Sig: Take 5 mg by mouth daily   atorvastatin (LIPITOR) 20 MG tablet 1/21/2024 at HS Self Yes Yes   Sig: Take 20 mg by mouth at bedtime   citalopram (CELEXA) 40 MG tablet 1/21/2024 at PM Self Yes Yes   Sig: Take 40 mg by mouth daily   gabapentin (NEURONTIN) 100 MG capsule   Yes Yes   Sig: Take 200 mg by mouth 3 times daily Patient taking 200 mg as needed for nerve pain   ketoconazole (NIZORAL) 2 % external shampoo  Self Yes No   Sig: Every two weeks prn   lisinopril (ZESTRIL) 40 MG tablet 1/21/2024 at PM Self Yes Yes   Sig: Take 40 mg by mouth daily   meloxicam (MOBIC) 15 MG tablet   No No   Sig: Take 1 tablet (15 mg) by mouth daily for 30 days   metFORMIN (GLUCOPHAGE) 500 MG tablet 1/21/2024 at PM Self Yes Yes   Sig: Take 500 mg by mouth daily (with breakfast)   multivitamin w/minerals (THERA-VIT-M) tablet 1/21/2024 at PM  No Yes   Sig: Take 1 tablet by mouth daily   pantoprazole (PROTONIX) 20 MG EC tablet  Self Yes No   Sig: Take 40 mg by mouth every evening   warfarin ANTICOAGULANT (COUMADIN) 5 MG tablet  Self Yes No   Sig: Take 5 mg by mouth Every Monday, Wednesday, and Friday   warfarin ANTICOAGULANT (COUMADIN) 5 MG tablet  Self Yes No   Sig: Take 10 mg by mouth daily Sunday, Tuesday, Thursday, Saturday      Facility-Administered Medications: None           Physical Exam   Vital Signs: Temp: 98.2  F (36.8  C)   BP: (!) 158/66 Pulse: 107   Resp: 18 SpO2: 97 % O2 Device: None (Room air)    Weight: 180 lbs 0 oz    General Appearance: Well-nourished female in no acute distress  Eyes:  pupils equal, round and reactive to  light, sclera anicteric  Respiratory: Lungs clear to auscultation bilaterally, no wheeze or crackles, no tachypnea  Cardiovascular: Regular rate and rhythm, S1/S2, no murmurs rubs or gallops  GI: Abdomen soft, nondistended, normal bowel sounds, reports slight tenderness to palpation of the right lower quadrant  Lymph/Hematologic: No peripheral edema  Musculoskeletal: Extremities warm and well-perfused  Neurologic: Alert and appropriate, cranial nerves grossly intact  Psychiatric: Normal affect    Medical Decision Making       65 MINUTES SPENT BY ME on the date of service doing chart review, history, exam, documentation & further activities per the note.  Tests REVIEWED in the past 24 hours:  - CMP  - CBC  - Coags/INR  Medical complexity over the past 24 hours:  - Prescription DRUG MANAGEMENT performed      Data     I have personally reviewed the following data over the past 24 hrs:    9.4  \   7.4 (L)   / 580 (H)     139 102 15.6 /  122 (H)   4.1 22 0.83 \     ALT: 19 AST: 29 AP: 59 TBILI: 0.2   ALB: 3.9 TOT PROTEIN: 6.9 LIPASE: N/A

## 2024-01-22 NOTE — PHARMACY-ADMISSION MEDICATION HISTORY
Pharmacist Admission Medication History    Admission medication history is complete. The information provided in this note is only as accurate as the sources available at the time of the update.    Information Source(s): Patient, Prescription bottles, and CareEverywhere/SureScripts via in-person    Pertinent information: PTA Warfarin - patient has not taken doses for 3 days due to GI bleed. Has been taking 5 mg M,W,F and 10 mg all other days. Patient has been taking 80 mg of Protonix daily lately due to increased abdominal pain.     Changes made to PTA medication list:  Added: Albuterol (not started yet)  Deleted: Duloxetine, Prednisone, meloxicam  Changed: Amlodipine 10 mg-> 5 mg    Medication Affordability:  Not including over the counter (OTC) medications, was there a time in the past 3 months when you did not take your medications as prescribed because of cost?: Unable to Assess    Allergies reviewed with patient and updates made in EHR: yes    Medication History Completed By: Rush Morrow [facility] 1/22/2024 5:40 PM    PTA Med List   Medication Sig Last Dose    amLODIPine (NORVASC) 5 MG tablet Take 5 mg by mouth daily Past Month at Issues with tablet size    atorvastatin (LIPITOR) 20 MG tablet Take 20 mg by mouth at bedtime 1/21/2024 at HS    citalopram (CELEXA) 40 MG tablet Take 40 mg by mouth daily 1/21/2024 at PM    gabapentin (NEURONTIN) 100 MG capsule Take 200 mg by mouth 3 times daily Patient taking 200 mg as needed for nerve pain Past Week at PRN    lisinopril (ZESTRIL) 40 MG tablet Take 40 mg by mouth daily 1/21/2024 at PM    metFORMIN (GLUCOPHAGE) 500 MG tablet Take 500 mg by mouth daily (with breakfast) 1/21/2024 at PM    multivitamin w/minerals (THERA-VIT-M) tablet Take 1 tablet by mouth daily 1/21/2024 at PM    pantoprazole (PROTONIX) 20 MG EC tablet Take 40 mg by mouth every evening Patient taking 4 tablets (80 mg) once daily 1/21/2024 at PM    warfarin ANTICOAGULANT (COUMADIN) 5 MG tablet Take 10  mg by mouth daily Sunday, Tuesday, Thursday, Saturday 1/18/2024    warfarin ANTICOAGULANT (COUMADIN) 5 MG tablet Take 5 mg by mouth Every Monday, Wednesday, and Friday 1/17/2024

## 2024-01-22 NOTE — ED TRIAGE NOTES
Pt says she has had black stools for 4 days, pt is on warfarin (yet stopped it 3 days ago). Pt having constant nausea

## 2024-01-22 NOTE — ED PROVIDER NOTES
History     Chief Complaint:  Rectal Bleeding     The history is provided by the patient.      Grace Jorgensen is a 73 year old female on Warfarin with a history of stroke, CHF, GERD, type 2 diabetes, hypertension, hyperlipidemia, and anemia who presents to the emergency department for black stools. The patient states that for 4 days, she has been experiencing black stool. She adds that she is also nauseous. She notes she is taking Warfarin due to stroke hx. Denies taking peptobismol or iron supplements. Denies ETOH use. Denies hx of gastric ulcer or varices. Denies abdominal pain or chest pain. She notes she is experiencing an intermittent cough. Denies congestion. Denies hematemesis. Denies vomiting. Denies urinary symptoms. No prior history of black stools. She held her warfarin in the past few days due to her black stools and her suspicion for having GI bleeding.    Review of Systems   Constitutional:  Negative for chills and fever.   HENT:  Negative for congestion and rhinorrhea.    Respiratory:  Positive for cough. Negative for shortness of breath.    Cardiovascular:  Negative for chest pain.   Gastrointestinal:  Positive for nausea. Negative for abdominal pain, diarrhea and vomiting.   Genitourinary:  Negative for dysuria and hematuria.   Musculoskeletal:  Negative for back pain and neck pain.   Neurological:  Negative for dizziness and headaches.       Independent Historian:   None - Patient Only    Review of External Notes:   I reviewed the patient's nursing telephone triage note from today.     Medications:    Amlodipine  Atorvastatin  Citalopram  Duloextine  Gabapentin  Lisinopril  Meloxicam  Metformin  Pantoprazole  Prednisone  COUMADIN    Past Medical History:    Cerebral infarction   Chronic heart failure with preserved ejection fraction   Embolic stroke   Gastroesophageal reflux disease  Paroxysmal atrial fibrillation   Type 2 diabetes mellitus   Tobacco use disorder  S/P mastectomy  Vitamin D  deficiency  Restless leg syndrome  Iron deficiency anemia  Hyponatremia  Hypertension  Hyperlipidemia  Humerus head fracture  Gastroparesis  Hallux valgus (acquired), right foot  Dyslipidemia  Depression  Anemia, unspecified  Anxiety state  Temporal arteritis     Past Surgical History:    Mastectomy  Pyelotomy  Hernia repair  Breast augmentation     Physical Exam   Patient Vitals for the past 24 hrs:   BP Temp Pulse Resp SpO2 Weight   01/22/24 1540 (!) 158/66 98.2  F (36.8  C) 107 18 97 % 81.6 kg (180 lb)      Physical Exam  Constitutional:       General: Not in acute distress.        Appearance: Normal appearance.   HENT:      Head: Normocephalic and atraumatic.   Eyes:      Extraocular Movements: Extraocular movements intact.      Conjunctiva/sclera: Conjunctivae normal.   Cardiovascular:      Rate and Rhythm: Normal rate and regular rhythm.   Pulmonary:      Effort: Pulmonary effort is normal. No respiratory distress.      Breath sounds: Normal breath sounds.   Abdominal:      General: Abdomen is flat. There is no distension.      Palpations: Abdomen is soft.      Tenderness: There is no abdominal tenderness.   Musculoskeletal:      Cervical back: Normal range of motion. No rigidity.      Right lower leg: No edema.      Left lower leg: No edema.   Skin:     General: Skin is warm and dry.   Neurological:      General: No focal deficit present.      Mental Status: Alert and oriented to person, place, and time.   Psychiatric:         Mood and Affect: Mood normal.         Behavior: Behavior normal.    Emergency Department Course     Laboratory:  Labs Ordered and Resulted from Time of ED Arrival to Time of ED Departure   COMPREHENSIVE METABOLIC PANEL - Abnormal       Result Value    Sodium 139      Potassium 4.1      Carbon Dioxide (CO2) 22      Anion Gap 15      Urea Nitrogen 15.6      Creatinine 0.83      GFR Estimate 74      Calcium 9.5      Chloride 102      Glucose 122 (*)     Alkaline Phosphatase 59      AST 29       ALT 19      Protein Total 6.9      Albumin 3.9      Bilirubin Total 0.2     OCCULT BLOOD STOOL - Abnormal    Occult Blood Positive (*)    CBC WITH PLATELETS AND DIFFERENTIAL - Abnormal    WBC Count 9.4      RBC Count 3.16 (*)     Hemoglobin 7.4 (*)     Hematocrit 25.2 (*)     MCV 80      MCH 23.4 (*)     MCHC 29.4 (*)     RDW 20.3 (*)     Platelet Count 580 (*)     % Neutrophils 56      % Lymphocytes 34      % Monocytes 9      % Eosinophils 0      % Basophils 1      % Immature Granulocytes 0      NRBCs per 100 WBC 0      Absolute Neutrophils 5.3      Absolute Lymphocytes 3.1      Absolute Monocytes 0.8      Absolute Eosinophils 0.0      Absolute Basophils 0.1      Absolute Immature Granulocytes 0.0      Absolute NRBCs 0.0     INR   PARTIAL THROMBOPLASTIN TIME   TYPE AND SCREEN, ADULT    ABO/RH(D) A POS      SPECIMEN EXPIRATION DATE [id number]     ABO/RH TYPE AND SCREEN        Emergency Department Course & Assessments:  ED Course as of 01/22/24 1733   Mon Jan 22, 2024   1549 Hemoccult positvie   1614 Discussed patient with hospitalist Dr. King who accepts patient for observation bed with telemetry     Interventions:  Medications   pantoprazole (PROTONIX) IV push injection 80 mg (has no administration in time range)      Assessments:  1541 I obtained history and examined the patient as noted above. I discussed findings and hospitalization with the patient. All questions answered.     Independent Interpretation (X-rays, CTs, rhythm strip):  None    Consultations/Discussion of Management or Tests:  ED Course as of 01/22/24 2115   Mon Jan 22, 2024   1549 Melena on chaperoned digital rectal exam.  Hemoccult positive with bedside developer.   1614 Discussed patient with hospitalist Dr. King who accepts patient for observation bed with telemetry.  Hemoglobin and INR pending at time of admission.      Social Determinants of Health affecting care:   None    Disposition:  The patient was admitted to the hospital  under the care of Dr. King.     Impression & Plan      Medical Decision Makin-year-old female as described above presents to the emergency department with melanotic stools.  Patient hemodynamically stable at time of evaluation.  Afebrile.  Mildly tachycardic.  Patient has held her home warfarin dose for the past few days.  Melanotic stools likely secondary to warfarin usage.  Patient also has some associated nausea, but no vomiting.  Concern for bleeding gastric ulcer versus upper GI AVM.  Will give 80 IV Protonix.  Patient will require admission for further hemoglobin monitoring in addition to likely GI consult for consideration for scoping.  Type and screen.  Transfuse as indicated.  Discussed care plan with patient and family at bedside who voiced understanding and agreement with plan.  Answered all questions.  Additional workup and orders as listed in chart.    Please refer to ED course above as part of continuation of MDM for details on the patient's treatment course and any changes or updates in care plan beyond my initial evaluation and MDM creation.    Due to hospital and departmental capacity constraints and prolonged wait times, this patient was evaluated in non-traditional circumstances such as in triage/waiting room, a hallway, etc. I explained the option to wait for a traditional treatment space and apologized for the inconvenience. Given the circumstances, every attempt was made to provide for the patient's comfort and privacy and to perform the most thorough evaluation possible.      Diagnosis:    ICD-10-CM    1. Gastrointestinal hemorrhage with melena  K92.1       2. Anemia, unspecified type  D64.9          Scribe Disclosure:  Jorge TAM, am serving as a scribe at 3:48 PM on 2024 to document services personally performed by Darion Garcia DO based on my observations and the provider's statements to me.     2024   Darion Garcia DO Yeh, Ferris, DO  244

## 2024-01-23 LAB
FERRITIN SERPL-MCNC: 21 NG/ML (ref 11–328)
GLUCOSE BLDC GLUCOMTR-MCNC: 183 MG/DL (ref 70–99)
HGB BLD-MCNC: 7.2 G/DL (ref 11.7–15.7)
HGB BLD-MCNC: 7.8 G/DL (ref 11.7–15.7)
HGB BLD-MCNC: 8.2 G/DL (ref 11.7–15.7)
HGB BLD-MCNC: 8.3 G/DL (ref 11.7–15.7)
INR PPP: 1.37 (ref 0.85–1.15)
TRANSFERRIN SERPL-MCNC: 310 MG/DL (ref 200–360)
UPPER GI ENDOSCOPY: NORMAL

## 2024-01-23 PROCEDURE — 36415 COLL VENOUS BLD VENIPUNCTURE: CPT | Performed by: HOSPITALIST

## 2024-01-23 PROCEDURE — 43235 EGD DIAGNOSTIC BRUSH WASH: CPT | Performed by: INTERNAL MEDICINE

## 2024-01-23 PROCEDURE — 120N000001 HC R&B MED SURG/OB

## 2024-01-23 PROCEDURE — 250N000013 HC RX MED GY IP 250 OP 250 PS 637: Performed by: HOSPITALIST

## 2024-01-23 PROCEDURE — 85018 HEMOGLOBIN: CPT | Performed by: HOSPITALIST

## 2024-01-23 PROCEDURE — 250N000011 HC RX IP 250 OP 636: Performed by: HOSPITALIST

## 2024-01-23 PROCEDURE — 0DJ08ZZ INSPECTION OF UPPER INTESTINAL TRACT, VIA NATURAL OR ARTIFICIAL OPENING ENDOSCOPIC: ICD-10-PCS | Performed by: INTERNAL MEDICINE

## 2024-01-23 PROCEDURE — 250N000013 HC RX MED GY IP 250 OP 250 PS 637: Performed by: INTERNAL MEDICINE

## 2024-01-23 PROCEDURE — 85610 PROTHROMBIN TIME: CPT | Performed by: HOSPITALIST

## 2024-01-23 PROCEDURE — 250N000011 HC RX IP 250 OP 636: Performed by: INTERNAL MEDICINE

## 2024-01-23 PROCEDURE — 0DJD8ZZ INSPECTION OF LOWER INTESTINAL TRACT, VIA NATURAL OR ARTIFICIAL OPENING ENDOSCOPIC: ICD-10-PCS | Performed by: INTERNAL MEDICINE

## 2024-01-23 PROCEDURE — C9113 INJ PANTOPRAZOLE SODIUM, VIA: HCPCS | Performed by: HOSPITALIST

## 2024-01-23 PROCEDURE — 99233 SBSQ HOSP IP/OBS HIGH 50: CPT | Performed by: HOSPITALIST

## 2024-01-23 PROCEDURE — 258N000003 HC RX IP 258 OP 636: Performed by: HOSPITALIST

## 2024-01-23 PROCEDURE — 999N000099 HC STATISTIC MODERATE SEDATION < 10 MIN: Performed by: INTERNAL MEDICINE

## 2024-01-23 RX ORDER — NALOXONE HYDROCHLORIDE 0.4 MG/ML
0.2 INJECTION, SOLUTION INTRAMUSCULAR; INTRAVENOUS; SUBCUTANEOUS
Status: DISCONTINUED | OUTPATIENT
Start: 2024-01-23 | End: 2024-01-25 | Stop reason: HOSPADM

## 2024-01-23 RX ORDER — POLYETHYLENE GLYCOL 3350 17 G/17G
238 POWDER, FOR SOLUTION ORAL ONCE
Status: COMPLETED | OUTPATIENT
Start: 2024-01-23 | End: 2024-01-23

## 2024-01-23 RX ORDER — NALOXONE HYDROCHLORIDE 0.4 MG/ML
0.4 INJECTION, SOLUTION INTRAMUSCULAR; INTRAVENOUS; SUBCUTANEOUS
Status: DISCONTINUED | OUTPATIENT
Start: 2024-01-23 | End: 2024-01-25 | Stop reason: HOSPADM

## 2024-01-23 RX ORDER — MAGNESIUM CARB/ALUMINUM HYDROX 105-160MG
296 TABLET,CHEWABLE ORAL ONCE
Status: COMPLETED | OUTPATIENT
Start: 2024-01-24 | End: 2024-01-24

## 2024-01-23 RX ORDER — BISACODYL 5 MG
10 TABLET, DELAYED RELEASE (ENTERIC COATED) ORAL ONCE
Status: COMPLETED | OUTPATIENT
Start: 2024-01-23 | End: 2024-01-23

## 2024-01-23 RX ORDER — FENTANYL CITRATE 50 UG/ML
INJECTION, SOLUTION INTRAMUSCULAR; INTRAVENOUS PRN
Status: DISCONTINUED | OUTPATIENT
Start: 2024-01-23 | End: 2024-01-23 | Stop reason: HOSPADM

## 2024-01-23 RX ORDER — ATORVASTATIN CALCIUM 20 MG/1
20 TABLET, FILM COATED ORAL AT BEDTIME
Status: DISCONTINUED | OUTPATIENT
Start: 2024-01-23 | End: 2024-01-25 | Stop reason: HOSPADM

## 2024-01-23 RX ORDER — FLUMAZENIL 0.1 MG/ML
0.2 INJECTION, SOLUTION INTRAVENOUS
Status: ACTIVE | OUTPATIENT
Start: 2024-01-23 | End: 2024-01-23

## 2024-01-23 RX ORDER — GABAPENTIN 100 MG/1
200 CAPSULE ORAL 3 TIMES DAILY
Status: DISCONTINUED | OUTPATIENT
Start: 2024-01-23 | End: 2024-01-25 | Stop reason: HOSPADM

## 2024-01-23 RX ORDER — SODIUM CHLORIDE AND POTASSIUM CHLORIDE 150; 900 MG/100ML; MG/100ML
INJECTION, SOLUTION INTRAVENOUS CONTINUOUS
Status: ACTIVE | OUTPATIENT
Start: 2024-01-24 | End: 2024-01-24

## 2024-01-23 RX ORDER — LANOLIN ALCOHOL/MO/W.PET/CERES
3 CREAM (GRAM) TOPICAL
Status: DISCONTINUED | OUTPATIENT
Start: 2024-01-23 | End: 2024-01-25 | Stop reason: HOSPADM

## 2024-01-23 RX ADMIN — SODIUM CHLORIDE, POTASSIUM CHLORIDE, SODIUM LACTATE AND CALCIUM CHLORIDE: 600; 310; 30; 20 INJECTION, SOLUTION INTRAVENOUS at 00:32

## 2024-01-23 RX ADMIN — LISINOPRIL 40 MG: 40 TABLET ORAL at 08:25

## 2024-01-23 RX ADMIN — PROCHLORPERAZINE EDISYLATE 5 MG: 5 INJECTION INTRAMUSCULAR; INTRAVENOUS at 04:05

## 2024-01-23 RX ADMIN — PANTOPRAZOLE SODIUM 40 MG: 40 INJECTION, POWDER, FOR SOLUTION INTRAVENOUS at 19:56

## 2024-01-23 RX ADMIN — ATORVASTATIN CALCIUM 20 MG: 20 TABLET, FILM COATED ORAL at 21:45

## 2024-01-23 RX ADMIN — POLYETHYLENE GLYCOL 3350 238 G: 17 POWDER, FOR SOLUTION ORAL at 17:24

## 2024-01-23 RX ADMIN — AMLODIPINE BESYLATE 5 MG: 5 TABLET ORAL at 08:25

## 2024-01-23 RX ADMIN — BISACODYL 10 MG: 5 TABLET, COATED ORAL at 12:37

## 2024-01-23 RX ADMIN — GABAPENTIN 200 MG: 100 CAPSULE ORAL at 19:55

## 2024-01-23 RX ADMIN — MELATONIN TAB 3 MG 3 MG: 3 TAB at 02:25

## 2024-01-23 RX ADMIN — PANTOPRAZOLE SODIUM 40 MG: 40 INJECTION, POWDER, FOR SOLUTION INTRAVENOUS at 08:26

## 2024-01-23 RX ADMIN — CITALOPRAM HYDROBROMIDE 40 MG: 20 TABLET ORAL at 08:26

## 2024-01-23 ASSESSMENT — ACTIVITIES OF DAILY LIVING (ADL)
DOING_ERRANDS_INDEPENDENTLY_DIFFICULTY: YES
ADLS_ACUITY_SCORE: 34
ADLS_ACUITY_SCORE: 30
DRESSING/BATHING_DIFFICULTY: NO
ADLS_ACUITY_SCORE: 30
TOILETING_ISSUES: NO
CONCENTRATING,_REMEMBERING_OR_MAKING_DECISIONS_DIFFICULTY: NO
ADLS_ACUITY_SCORE: 26
ADLS_ACUITY_SCORE: 26
EQUIPMENT_CURRENTLY_USED_AT_HOME: WALKER, ROLLING
DIFFICULTY_EATING/SWALLOWING: NO
ADLS_ACUITY_SCORE: 30
CHANGE_IN_FUNCTIONAL_STATUS_SINCE_ONSET_OF_CURRENT_ILLNESS/INJURY: NO
ADLS_ACUITY_SCORE: 34
WALKING_OR_CLIMBING_STAIRS_DIFFICULTY: NO
ADLS_ACUITY_SCORE: 30
ADLS_ACUITY_SCORE: 30
ADLS_ACUITY_SCORE: 34
WEAR_GLASSES_OR_BLIND: YES
ADLS_ACUITY_SCORE: 26
DIFFICULTY_COMMUNICATING: NO
ADLS_ACUITY_SCORE: 26

## 2024-01-23 NOTE — PLAN OF CARE
Goal Outcome Evaluation:         Top portion must ALWAYS be completed  PRIMARY Concern: Black tarry stool  SAFETY RISK Concerns (fall risk, behaviors, etc.): Fall Risk      Isolation/Type: n/a  Tests/Procedures for NEXT shift: EGD  Consults? (Pending/following, signed-off?) GI consult  Where is patient from? (Home, TCU, etc.): Home w/ son  Other Important info for NEXT shift: NPO @ midnight  Anticipated DC date & active delays: TBD  _____________________________________________________________________________  SUMMARY NOTE:              (either paste note here OR complete the info below- RN to choose one- delete info below if not used)  Orientation/Cognitive: A&Ox4  Observation Goals (Met/ Not Met): Inpatient  Mobility Level/Assist Equipment: SBA w/ walker  Antibiotics & Plan (IV/po, length of tx left): n/a  Pain Management: Denies  Tele/VS/O2: VSS on RA except hypertensive to 160's  ABNL Lab/BG: Hgb 6.9, blood transfused now 8.3  Diet: NPO @ midnight  Bowel/Bladder: Cont. B&B up to BR voiding, no BM  Skin Concerns: n/a  Drains/Devices: IV LR at 50  Patient Stated Goal for Today: n/a  Other:   Avoid blood pressures on L arm. Blood transfusion complete no reactions. Some nausea, compazine x 1. Melatonin x 1. Denied SOB. Patient irritable at times, mood liable. Lung sounds clear. Bowel sounds active. CMS intact ex nueropathy in feet. Continue to monitor.

## 2024-01-23 NOTE — PROVIDER NOTIFICATION
MD Notification    Notified Person: MD    Notified Person Name: On call MD Dr Dove    Notification Date/Time:1/22/2024  9:36 PM    Notification Interaction: [employer] messaging    Purpose of Notification: Pt's BP very high in the 180s, about to receive blood trx, please advise. Can she have PRNs?    Orders Received: OK to trx    Comments:

## 2024-01-23 NOTE — PLAN OF CARE
Goal Outcome Evaluation:      Plan of Care Reviewed With: patient    Overall Patient Progress: no changeOverall Patient Progress: no change    1/23/24 [id number]    PRIMARY Concern: Black tarry stool  SAFETY RISK Concerns (fall risk, behaviors, etc.): Fall Risk      Isolation/Type: n/a  Tests/Procedures for NEXT shift: bowel prep for planned colonoscopy tomorrow.   Consults? (Pending/following, signed-off?) GI following.   Where is patient from? (Home, TCU, etc.): Home w/ son  Other Important info for NEXT shift: bowel prep to started at 1730. Clear liquids, NPO at midnight. Refused IV fluids today.  Anticipated DC date & active delays: TBD  _____________________________________________________________________________  SUMMARY NOTE:  Orientation/Cognitive: A&Ox4; can be irritable at times, labile moods.   Observation Goals (Met/ Not Met): Inpatient  Mobility Level/Assist Equipment: SBA w/ walker  Antibiotics & Plan (IV/po, length of tx left): n/a  Pain Management: Denies  Tele/VS/O2: VSS on RA except hypertensive to 160's  ABNL Lab/BG: Hgb 7.8, 1 unit PRBC in ED. Recheck hgb at 2200  Diet: clears, NPO @ midnight  Bowel/Bladder: Cont. B&B up to BR voiding, no BM so far today, prep started.   Skin Concerns: n/a  Drains/Devices: n/a, refused fluids.   Patient Stated Goal for Today: was hopeful for d/c  Other:   Avoid blood pressures on L arm, has steel janie in place.  Blood transfusion x1 no reactions. Some nausea. Refused IV fluids. Denied SOB.

## 2024-01-23 NOTE — CONSULTS
[facility]  Gastroenterology Consultation         Grace Jorgensen  [address] [facility] [address]  73 year old female    Admission Date/Time: 1/22/2024  Primary Care Provider: [facility]  Referring / Attending Physician:  Dr. Rahat King    We were asked to see the patient in consultation by Dr. Rahat King for evaluation of Upper GI bleed.      CC: melena    HPI:  Grace Jorgensen is a 73 year old female who has a PMHx of paroxysmal A-fib on warfarin, CVA, HTN, gastroparesis s/p gastric stimulator, hiatal hernia who presents with black stools. She states she started noticing dark stools 3-4 days ago and stopped her warfarin. She has had associated nausea, with no emesis. Denies recent NSAID use. Has had no abdominal pain. Did have some looser stools. Denies prior history of GI bleed. No fever, chills, chest pain, heartburn, dysphagia or other concerns.       ROS: A comprehensive ten point review of systems was negative aside from those in mentioned in the HPI.      PAST MED HX:  I have reviewed this patient's medical history and updated it with pertinent information if needed.   No past medical history on file.    MEDICATIONS:   Prior to Admission Medications   Prescriptions Last Dose Informant Patient Reported? Taking?   albuterol (PROAIR HFA/PROVENTIL HFA/VENTOLIN HFA) 108 (90 Base) MCG/ACT inhaler Not started  Yes No   Sig: Inhale 2 puffs into the lungs every 6 hours as needed for shortness of breath, wheezing or cough   amLODIPine (NORVASC) 5 MG tablet Past Month at Issues with tablet size  Yes Yes   Sig: Take 5 mg by mouth daily   atorvastatin (LIPITOR) 20 MG tablet 1/21/2024 at HS Self Yes Yes   Sig: Take 20 mg by mouth at bedtime   citalopram (CELEXA) 40 MG tablet 1/21/2024 at PM Self Yes Yes   Sig: Take 40 mg by mouth daily   gabapentin (NEURONTIN) 100 MG capsule Past Week at PRN  Yes Yes   Sig: Take 200 mg by mouth 3 times daily Patient  taking 200 mg as needed for nerve pain   ketoconazole (NIZORAL) 2 % external shampoo  Self Yes No   Sig: Every two weeks prn   lisinopril (ZESTRIL) 40 MG tablet 1/21/2024 at PM Self Yes Yes   Sig: Take 40 mg by mouth daily   metFORMIN (GLUCOPHAGE) 500 MG tablet 1/21/2024 at PM Self Yes Yes   Sig: Take 500 mg by mouth daily (with breakfast)   multivitamin w/minerals (THERA-VIT-M) tablet 1/21/2024 at PM  No Yes   Sig: Take 1 tablet by mouth daily   pantoprazole (PROTONIX) 20 MG EC tablet 1/21/2024 at PM Self Yes Yes   Sig: Take 40 mg by mouth every evening Patient taking 4 tablets (80 mg) once daily   warfarin ANTICOAGULANT (COUMADIN) 5 MG tablet 1/17/2024 Self Yes Yes   Sig: Take 5 mg by mouth Every Monday, Wednesday, and Friday   warfarin ANTICOAGULANT (COUMADIN) 5 MG tablet 1/18/2024 Self Yes Yes   Sig: Take 10 mg by mouth daily Sunday, Tuesday, Thursday, Saturday      Facility-Administered Medications: None       ALLERGIES:   Allergies   Allergen Reactions    Bupropion GI Disturbance       SOCIAL HISTORY:       FAMILY HISTORY:  No family history on file.    PHYSICAL EXAM:   General  alert, oriented and c/o generalized bodyaches  Vital Signs with Ranges  Temp: 98.3  F (36.8  C) Temp src: Oral BP: (!) 166/84 Pulse: 92   Resp: 18 SpO2: 92 % O2 Device: None (Room air)    I/O last 3 completed shifts:  In: 264   Out: -     Constitutional: healthy, alert, and no distress   Cardiovascular: negative, PMI normal. No lifts, heaves, or thrills. RRR. No murmurs, clicks gallops or rub  Respiratory: negative, Percussion normal. Good diaphragmatic excursion. Lungs clear  Abdomen: Abdomen soft, non-tender. BS normal. No masses, organomegaly          ADDITIONAL COMMENTS:   I reviewed the patient's new clinical lab test results.   Recent Labs   Lab Test 01/23/24  0635 01/23/24  0004 01/22/24  1801 01/22/24  1556 10/25/23  0609 10/23/23  1458 10/23/23  0557 10/22/23  1155 10/21/23  0644 10/20/23  1642   WBC  --   --   --  9.4  --    --   --   --  5.9 7.9   HGB 7.2* 8.3* 6.9* 7.4*  --   --   --   --  9.6* 9.7*   MCV  --   --   --  80  --   --   --   --  82 84   PLT  --   --   --  580*  --   --  416  --  390 429   INR 1.37*  --  1.66*  --  1.86*   < >  --    < > 2.20* 2.40*    < > = values in this interval not displayed.     Recent Labs   Lab Test 01/22/24  1556 10/25/23  0609 10/24/23  0750 10/22/23  0641 10/21/23  0644 10/20/23  1642   POTASSIUM 4.1 3.9 4.2   < > 4.1 4.8   CHLORIDE 102  --   --   --  102 102   CO2 22  --   --   --  20* 21*   BUN 15.6  --   --   --  14.1 13.9   ANIONGAP 15  --   --   --  12 12    < > = values in this interval not displayed.     Recent Labs   Lab Test 01/22/24  1556 10/20/23  1642   ALBUMIN 3.9 3.9   BILITOTAL 0.2 <0.2   ALT 19 14   AST 29 23       I reviewed the patient's new imaging results.        CONSULTATION ASSESSMENT AND PLAN:    Grace Jorgensen is a 73 year old female admitted on 1/22/2024 with PMHx of paroxysmal A-fib on warfarin, CVA, HTN, gastroparesis s/p gastric stimulator, hiatal hernia who presents with black stools and acute blood loss anemia.    Gastrointestinal hemorrhage with melena  Anemia, unspecified type  Gastroparesis  Hiatal hernia  Admission hgb 7.4 down from 9.6 when last checked 10/21/23 (baseline hgb 9-10 g/dL). Dropped to 6.9 and transfused with improvement and drop again to 7.2  INR 1.86 on admission, this a.m. 1.37  Denies alcohol or NSAID use  No hx PUD per patient, has known hiatal hernia  Differential includes PUD, gastritis, H pylori, angiodysplastic lesion, neoplasm, diverticulum     - EGD today  - NPO  - hold warfarin  - serial hemoglobin and transfuse for 7 or less  - IV pantoprazole 40 mg BID        [facility].  Office: [phone]  Cell : [phone] (Dr. Dotson)  Cell: [phone] (Rupinder Rojo PA-C)    73 year old female admitted on 1/22/2024. Past history of paroxysmal A-fib on warfarin, CVA, HTN, gastroparesis s/p gastric  stimulator, hiatal hernia who presents with black stools.admission hgb 7.4 down from 9.6 when last checked 10/21/23 (baseline hgb 9-10 g/dL).  stopped taking warfarin 3 days prior to arrival, but admission INR 1.86  I/V Protonix  Serial Hb  INR in AM  Plan for EGD in AM  Type and harleen Dotson MD FACP  [facility]

## 2024-01-23 NOTE — PROVIDER NOTIFICATION
PRIMARY Concern: Black tarry stool  SAFETY RISK Concerns (fall risk, behaviors, etc.): Fall Risk      Isolation/Type: n/a  Tests/Procedures for NEXT shift: n/a  Consults? (Pending/following, signed-off?) GI consult  Where is patient from? (Home, TCU, etc.): Home w/ son  Other Important info for NEXT shift: NPO @ midnight  Anticipated DC date & active delays: TBD  _____________________________________________________________________________  SUMMARY NOTE:    Orientation/Cognitive: A&Ox4  Observation Goals (Met/ Not Met): Inpatient  Mobility Level/Assist Equipment: SBA w/ walker  Antibiotics & Plan (IV/po, length of tx left): n/a  Pain Management: Denies  Tele/VS/O2: VSS on RA except hypertensive to 160's  ABNL Lab/BG: Hgb 6.9,   Diet: Clear liquid- NPO @ midnight  Bowel/Bladder: Cont. B&B  Skin Concerns: n/a  Drains/Devices: Current receiving blood- Pt has no complaints  Patient Stated Goal for Today: n/a

## 2024-01-23 NOTE — PROGRESS NOTES
PRIMARY Concern: Black tarry stool  SAFETY RISK Concerns (fall risk, behaviors, etc.): Fall Risk      Isolation/Type: n/a  Tests/Procedures for NEXT shift: n/a  Consults? (Pending/following, signed-off?) GI consult  Where is patient from? (Home, TCU, etc.): Home w/ son  Other Important info for NEXT shift: NPO @ midnight, on Q6H Hgb draw  Anticipated DC date & active delays: TBD  _____________________________________________________________________________  SUMMARY NOTE:    Orientation/Cognitive: A&Ox4  Observation Goals (Met/ Not Met): Inpatient  Mobility Level/Assist Equipment: SBA w/ walker  Antibiotics & Plan (IV/po, length of tx left): n/a  Pain Management: Denies  Tele/VS/O2: VSS on RA except hypertensive to 180's- MD notified  ABNL Lab/BG: Hgb 6.9,   Diet: Clear liquid- NPO @ midnight  Bowel/Bladder: Cont. B&B  Skin Concerns: n/a  Drains/Devices: Current receiving blood- Pt has no complaints  Patient Stated Goal for Today: n/a

## 2024-01-23 NOTE — PROVIDER NOTIFICATION
MD Notification    Notified Person: MD    Notified Person Name: Xander    Notification Date/Time: 0142 1-23-24, 0142    Notification Interaction: amcom    Purpose of Notification: Pt is requesting melatonin for sleep at least 3mg. Also requesting metformin says she takes it at home at 2AM and would like to stay on that schedule.    Orders Received: 3 mg melatinin, hold metformin    Comments:

## 2024-01-23 NOTE — PROGRESS NOTES
[facility]  Hospitalist Progress Note   01/23/2024          Assessment and Plan:       Grace Jorgensen is a 73 year old female admitted on 1/22/2024. Past history of paroxysmal A-fib on warfarin, CVA, HTN, gastroparesis s/p gastric stimulator, hiatal hernia who presents with black stools.     Suspect GI bleed  Suspected acute on chronic anemia secondary to blood loss.  Status post 1 unit blood transfusion  *presents with 2 black stools in past 5 days, admission hgb 7.4 down from 9.6 when last checked 10/21/23 (baseline hgb 9-10 g/dL)  *stopped taking warfarin 3 days prior to arrival, but admission INR 1.86  *denies alcohol or NSAID use  *no hx PUD per patient, has known hiatal hernia  *slightly tachycardic in ED but hypertensive on admission hemoglobin dropped to 6.9.  --Received 1 unit blood transfusion with patient this morning hemoglobin at 7.2.  Undergoing EGD this morning.  [facility] gastroenterology following, plan for colonoscopy tomorrow if EGD nonrevealing.  Continue to hold PTA Coumadin.  Discussed risk of holding anticoagulation with patient's family.  Continue IV PPI twice daily.  Monitor hemoglobin levels every 8 hours.  Conditional order to transfuse for hemoglobin less than 7.    Addendum.  EGD reviewed.  Plan for colonoscopy tomorrow.  Full liquid diet tonight.  N.p.o. from midnight.  IV fluids from tomorrow AM.  Patient slightly reluctant but agreeing for evaluation.     Paroxysmal A-fib  Hx CVA with chronic dizziness  Hypertension.   Patient denies any chest pain or palpitations or shortness of breath at this time.  Will start on telemetry monitoring.  Continue to hold PTA Coumadin.  Discussed risks of holding Coumadin with patient and family.  Will continue PTA lisinopril, amlodipine with hold parameters.  Resume PTA statin therapy.      DM II  *A1C 6.5% in November 2023  - hold PTA metformin; no need for POC glucose checks  Monitor blood sugars in AM     Hx temporal arteritis vs  trigeminal neuralgia  *hospitalized 10/2023, refused temporal artery biopsy  - continue PTA gabapentin     Physical deconditioning from medical illness, senile frailty.  Lives at home with her son.  PT evaluation prior to discharge.       Orders Placed This Encounter      Clear Liquid Diet      DVT Prophylaxis: SCDs, ambulate  Code Status: Full Code  Disposition: Expected discharge in 2 days pending clinical improvement.  Discussed with UR, switch to inpatient.    Discussed with patient, her son by the bedside, bedside RN, gastroenterologist  >51 minutes spent by me on the date of service doing chart review, history, exam, documentation & further activities per the note.      Marry Cruz MD        Interval History:        Patient lying in bed.  Denies any chest pain or palpitations.  Denies any headache or dizziness.  No nausea or vomiting.  No active bleeding since this morning.  Has been n.p.o. for EGD.  Discussed with Dr. Dotson, EGD nonrevealing.  Plan for colonoscopy tomorrow.  Received unit of blood transfusion overnight.  Minimal ambulation out of bed.             Physical Exam:        Physical Exam   Temp:  [98  F (36.7  C)-99.1  F (37.3  C)] 98.3  F (36.8  C)  Pulse:  [] 91  Resp:  [9-46] 18  BP: (118-186)/(55-92) 143/75  FiO2 (%):  [2 %] 2 %  SpO2:  [91 %-99 %] 96 %    Intake/Output Summary (Last 24 hours) at 1/23/2024 1607  Last data filed at 1/23/2024 0002  Gross per 24 hour   Intake 264 ml   Output --   Net 264 ml       Admission Weight: 81.6 kg (180 lb)  Current Weight: 81.6 kg (180 lb)    PHYSICAL EXAM  GENERAL: Patient is in no distress. Alert and oriented.  HEART: Regular rate and rhythm. S1S2.  LUNGS: Clear to auscultation bilaterally. No expiratory wheeze.  Respirations unlabored  ABDOMEN: Soft, no abdominal tenderness, bowel sounds heard   NEURO moving all extremities.  EXTREMITIES: No pedal edema.   SKIN: Warm, dry. No rash  PSYCHIATRY Cooperative       Medications:          amLODIPine  5 mg Oral Daily    citalopram  40 mg Oral Daily    lisinopril  40 mg Oral Daily    polyethylene glycol  238 g Oral Once    Followed by    [START ON 1/24/2024] magnesium citrate  296 mL Oral Once    pantoprazole  40 mg Intravenous Q12H    sodium chloride (PF)  3 mL Intracatheter Q8H     acetaminophen **OR** acetaminophen, bisacodyl, flumazenil, lidocaine 4%, lidocaine (buffered or not buffered), melatonin, naloxone, naloxone, naloxone, naloxone, ondansetron **OR** ondansetron, polyethylene glycol, prochlorperazine **OR** prochlorperazine **OR** prochlorperazine, senna-docusate **OR** senna-docusate, sodium chloride (PF)         Data:      All new lab and imaging data was reviewed.

## 2024-01-24 LAB
ANION GAP SERPL CALCULATED.3IONS-SCNC: 12 MMOL/L (ref 7–15)
BUN SERPL-MCNC: 7.1 MG/DL (ref 8–23)
CALCIUM SERPL-MCNC: 9.1 MG/DL (ref 8.8–10.2)
CHLORIDE SERPL-SCNC: 107 MMOL/L (ref 98–107)
COLONOSCOPY: NORMAL
CREAT SERPL-MCNC: 0.78 MG/DL (ref 0.51–0.95)
DEPRECATED HCO3 PLAS-SCNC: 18 MMOL/L (ref 22–29)
EGFRCR SERPLBLD CKD-EPI 2021: 79 ML/MIN/1.73M2
ERYTHROCYTE [DISTWIDTH] IN BLOOD BY AUTOMATED COUNT: 20 % (ref 10–15)
GLUCOSE SERPL-MCNC: 127 MG/DL (ref 70–99)
HCT VFR BLD AUTO: 25.1 % (ref 35–47)
HGB BLD-MCNC: 7.7 G/DL (ref 11.7–15.7)
MAGNESIUM SERPL-MCNC: 1.8 MG/DL (ref 1.7–2.3)
MAGNESIUM SERPL-MCNC: 2 MG/DL (ref 1.7–2.3)
MCH RBC QN AUTO: 23.8 PG (ref 26.5–33)
MCHC RBC AUTO-ENTMCNC: 30.7 G/DL (ref 31.5–36.5)
MCV RBC AUTO: 78 FL (ref 78–100)
PLATELET # BLD AUTO: 494 10E3/UL (ref 150–450)
POTASSIUM SERPL-SCNC: 3.1 MMOL/L (ref 3.4–5.3)
POTASSIUM SERPL-SCNC: 3.2 MMOL/L (ref 3.4–5.3)
POTASSIUM SERPL-SCNC: 3.2 MMOL/L (ref 3.4–5.3)
RBC # BLD AUTO: 3.23 10E6/UL (ref 3.8–5.2)
SODIUM SERPL-SCNC: 137 MMOL/L (ref 135–145)
WBC # BLD AUTO: 7.7 10E3/UL (ref 4–11)

## 2024-01-24 PROCEDURE — 45378 DIAGNOSTIC COLONOSCOPY: CPT | Performed by: INTERNAL MEDICINE

## 2024-01-24 PROCEDURE — 85027 COMPLETE CBC AUTOMATED: CPT | Performed by: HOSPITALIST

## 2024-01-24 PROCEDURE — 250N000013 HC RX MED GY IP 250 OP 250 PS 637: Performed by: HOSPITALIST

## 2024-01-24 PROCEDURE — 250N000011 HC RX IP 250 OP 636: Performed by: HOSPITALIST

## 2024-01-24 PROCEDURE — 84132 ASSAY OF SERUM POTASSIUM: CPT | Performed by: HOSPITALIST

## 2024-01-24 PROCEDURE — 80048 BASIC METABOLIC PNL TOTAL CA: CPT | Performed by: HOSPITALIST

## 2024-01-24 PROCEDURE — 250N000013 HC RX MED GY IP 250 OP 250 PS 637: Performed by: INTERNAL MEDICINE

## 2024-01-24 PROCEDURE — 120N000001 HC R&B MED SURG/OB

## 2024-01-24 PROCEDURE — 250N000013 HC RX MED GY IP 250 OP 250 PS 637

## 2024-01-24 PROCEDURE — G0500 MOD SEDAT ENDO SERVICE >5YRS: HCPCS | Performed by: INTERNAL MEDICINE

## 2024-01-24 PROCEDURE — 99233 SBSQ HOSP IP/OBS HIGH 50: CPT | Performed by: HOSPITALIST

## 2024-01-24 PROCEDURE — 258N000003 HC RX IP 258 OP 636: Performed by: HOSPITALIST

## 2024-01-24 PROCEDURE — 250N000011 HC RX IP 250 OP 636: Performed by: INTERNAL MEDICINE

## 2024-01-24 PROCEDURE — C9113 INJ PANTOPRAZOLE SODIUM, VIA: HCPCS | Performed by: HOSPITALIST

## 2024-01-24 PROCEDURE — 36415 COLL VENOUS BLD VENIPUNCTURE: CPT | Performed by: HOSPITALIST

## 2024-01-24 PROCEDURE — 83735 ASSAY OF MAGNESIUM: CPT | Performed by: HOSPITALIST

## 2024-01-24 RX ORDER — FLUMAZENIL 0.1 MG/ML
0.2 INJECTION, SOLUTION INTRAVENOUS
Status: DISCONTINUED | OUTPATIENT
Start: 2024-01-24 | End: 2024-01-25

## 2024-01-24 RX ORDER — LIDOCAINE 40 MG/G
CREAM TOPICAL
Status: DISCONTINUED | OUTPATIENT
Start: 2024-01-24 | End: 2024-01-24 | Stop reason: HOSPADM

## 2024-01-24 RX ORDER — FENTANYL CITRATE 50 UG/ML
INJECTION, SOLUTION INTRAMUSCULAR; INTRAVENOUS PRN
Status: DISCONTINUED | OUTPATIENT
Start: 2024-01-24 | End: 2024-01-24 | Stop reason: HOSPADM

## 2024-01-24 RX ORDER — MAGNESIUM OXIDE 400 MG/1
400 TABLET ORAL EVERY 4 HOURS
Status: COMPLETED | OUTPATIENT
Start: 2024-01-24 | End: 2024-01-24

## 2024-01-24 RX ORDER — POTASSIUM CHLORIDE 7.45 MG/ML
10 INJECTION INTRAVENOUS
Status: DISPENSED | OUTPATIENT
Start: 2024-01-24 | End: 2024-01-24

## 2024-01-24 RX ORDER — POTASSIUM CHLORIDE 1500 MG/1
40 TABLET, EXTENDED RELEASE ORAL ONCE
Status: COMPLETED | OUTPATIENT
Start: 2024-01-24 | End: 2024-01-25

## 2024-01-24 RX ORDER — WARFARIN SODIUM 10 MG/1
10 TABLET ORAL
Status: COMPLETED | OUTPATIENT
Start: 2024-01-24 | End: 2024-01-24

## 2024-01-24 RX ORDER — POTASSIUM CHLORIDE 1500 MG/1
40 TABLET, EXTENDED RELEASE ORAL ONCE
Status: COMPLETED | OUTPATIENT
Start: 2024-01-24 | End: 2024-01-24

## 2024-01-24 RX ORDER — ONDANSETRON 2 MG/ML
4 INJECTION INTRAMUSCULAR; INTRAVENOUS
Status: DISCONTINUED | OUTPATIENT
Start: 2024-01-24 | End: 2024-01-24 | Stop reason: HOSPADM

## 2024-01-24 RX ORDER — POTASSIUM CHLORIDE 1.5 G/1.58G
40 POWDER, FOR SOLUTION ORAL ONCE
Status: COMPLETED | OUTPATIENT
Start: 2024-01-24 | End: 2024-01-24

## 2024-01-24 RX ADMIN — ACETAMINOPHEN 650 MG: 325 TABLET, FILM COATED ORAL at 11:40

## 2024-01-24 RX ADMIN — IRON SUCROSE 300 MG: 20 INJECTION, SOLUTION INTRAVENOUS at 17:44

## 2024-01-24 RX ADMIN — POTASSIUM CHLORIDE 40 MEQ: 1.5 POWDER, FOR SOLUTION ORAL at 16:30

## 2024-01-24 RX ADMIN — GABAPENTIN 200 MG: 100 CAPSULE ORAL at 20:58

## 2024-01-24 RX ADMIN — ATORVASTATIN CALCIUM 20 MG: 20 TABLET, FILM COATED ORAL at 20:58

## 2024-01-24 RX ADMIN — LISINOPRIL 40 MG: 40 TABLET ORAL at 09:05

## 2024-01-24 RX ADMIN — WARFARIN SODIUM 10 MG: 10 TABLET ORAL at 17:41

## 2024-01-24 RX ADMIN — GABAPENTIN 200 MG: 100 CAPSULE ORAL at 16:28

## 2024-01-24 RX ADMIN — GABAPENTIN 200 MG: 100 CAPSULE ORAL at 09:05

## 2024-01-24 RX ADMIN — Medication 400 MG: at 11:13

## 2024-01-24 RX ADMIN — ACETAMINOPHEN 650 MG: 325 TABLET, FILM COATED ORAL at 21:07

## 2024-01-24 RX ADMIN — AMLODIPINE BESYLATE 5 MG: 5 TABLET ORAL at 09:05

## 2024-01-24 RX ADMIN — CITALOPRAM HYDROBROMIDE 40 MG: 20 TABLET ORAL at 09:05

## 2024-01-24 RX ADMIN — POTASSIUM CHLORIDE AND SODIUM CHLORIDE: 900; 150 INJECTION, SOLUTION INTRAVENOUS at 06:11

## 2024-01-24 RX ADMIN — Medication 400 MG: at 16:29

## 2024-01-24 RX ADMIN — MAGNESIUM CITRATE 296 ML: 1.75 LIQUID ORAL at 03:55

## 2024-01-24 RX ADMIN — PANTOPRAZOLE SODIUM 40 MG: 40 INJECTION, POWDER, FOR SOLUTION INTRAVENOUS at 09:05

## 2024-01-24 RX ADMIN — MELATONIN TAB 3 MG 3 MG: 3 TAB at 01:49

## 2024-01-24 ASSESSMENT — ACTIVITIES OF DAILY LIVING (ADL)
ADLS_ACUITY_SCORE: 26

## 2024-01-24 NOTE — PLAN OF CARE
PRIMARY Concern: Black tarry stool  SAFETY RISK Concerns (fall risk, behaviors, etc.): Fall Risk      Isolation/Type: n/a  Tests/Procedures for NEXT shift: Colonoscopy, Hgb check  Consults? (Pending/following, signed-off?) GI following.   Where is patient from? (Home, TCU, etc.): Home w/ son  Other Important info for NEXT shift: NPO since midnight  Anticipated DC date & active delays: TBD  ___________________________________________  SUMMARY NOTE:  Orientation/Cognitive: A&Ox4  Observation Goals (Met/ Not Met): Inpatient  Mobility Level/Assist Equipment: Ind w/ walker in room. To bsc   Antibiotics & Plan (IV/po, length of tx left): n/a  Pain Management: Denies  Tele/VS/O2: VSS on RA, no BP on L arm. Tele: Sinus tachy  ABNL Lab/BG: Hgb 8.2  Diet: NPO  Bowel/Bladder: Cont. B&B up to BR voiding, multiple loose stools   Skin Concerns: n/a  Drains/Devices: PIV SL  Patient Stated Goal for Today: Be able to go home tomorrow

## 2024-01-24 NOTE — PLAN OF CARE
Goal Outcome Evaluation:    PRIMARY Concern: Black tarry stool  SAFETY RISK Concerns (fall risk, behaviors, etc.): Fall Risk      Isolation/Type: n/a  Tests/Procedures for NEXT shift: Colonoscopy, Hgb recheck, restart coumadin, replace electrolytes  Consults? (Pending/following, signed-off?) GI following.   Where is patient from? (Home, TCU, etc.): Home w/ son  Other Important info for NEXT shift: NPO since midnight  Anticipated DC date & active delays: TBD  ___________________________________________  SUMMARY NOTE:  Orientation/Cognitive: A&Ox4  Observation Goals (Met/ Not Met): Inpatient  Mobility Level/Assist Equipment: Ind w/ walker in room to BSC  Antibiotics & Plan (IV/po, length of tx left): n/a  Pain Management: Denies  Tele/VS/O2: VSS on RA, no BP on L arm d/t metal janie. Tele:NSR  ABNL Lab/BG: Hgb 7.7  Diet: NPO  Bowel/Bladder: Cont. B&B up to BR/BSC voiding adequately, multiple loose stools   Skin Concerns: n/a  Drains/Devices: PIV SL  Patient Stated Goal for Today: To discharge home

## 2024-01-24 NOTE — PROGRESS NOTES
[facility]  Hospitalist Progress Note   01/24/2024          Assessment and Plan:       Grace Jorgensen is a 73 year old female admitted on 1/22/2024. Past history of paroxysmal A-fib on warfarin, CVA, HTN, gastroparesis s/p gastric stimulator, hiatal hernia admitted on 1/22/2024 with dark-colored stools.     Suspect GI bleed  Acute on chronic anemia secondary to blood loss.  Status post 1 unit blood transfusion  Iron deficiency.  *Presented with 2 black stools in past 5 days PTA, admission hgb 7.4 down from 9.6 when last checked 10/21/23 (baseline hgb 9-10 g/dL)  *Stopped taking warfarin 3 days prior to arrival, but admission INR 1.86  *denied alcohol or NSAID use  *no hx PUD per patient, has known hiatal hernia.  -- On admission slightly tachycardic, hemoglobin dropped to 6.9.    Iron saturation index 13 occult blood positive.  Received 1 unit blood transfusion on 1/23 with which hemoglobin has been in the 7 range.  --EGD 1/23 with normal esophagus, normal stomach, normal duodenum.  No specimens collected.  --[facility] gastroenterology following, plan for colonoscopy today.  Continue to hold PTA Coumadin.  Discussed risk of holding anticoagulation with patient's family.  Continue IV PPI twice daily.  Monitor hemoglobin levels every 12 hours.  Conditional order to transfuse for hemoglobin less than 7.  Will administer 1 dose of IV Venofer.    Hypokalemia from bowel prep.  Serum potassium 3.2.  Replacement protocol ordered.  Switch fluids with potassium this morning.  Monitor closely.    Thrombocytosis likely reactive.  Noted platelets of 580 on admission, trending down to 494.  Monitor in AM.     Paroxysmal A-fib on anticoagulation  Patient denies any chest pain or palpitations or shortness of breath at this time.  Continue telemetry monitoring.  Continue to hold PTA Coumadin.  Risks of holding Coumadin discussed with patient's and family.  Resume PTA Coumadin once okayed by GI.  Monitor INR in  AM.    Hx CVA with chronic dizziness  Continue to hold PTA Coumadin, resume once okayed by GI.  Continue PTA statin therapy.    Hypertension.  Will continue PTA lisinopril, amlodipine with hold parameters.  Resume PTA statin therapy.      DM II  *A1C 6.5% in November 2023  Hold PTA metformin; no need for POC glucose checks  Monitor blood sugars in AM     Hx temporal arteritis vs trigeminal neuralgia  *hospitalized 10/2023, refused temporal artery biopsy  - continue PTA gabapentin     Physical deconditioning from medical illness, senile frailty.  Lives at home with her son.  PT evaluation prior to discharge, patient declined this morning.     Orders Placed This Encounter      NPO per Anesthesia Guidelines for Procedure/Surgery Except for: Meds      Diet      DVT Prophylaxis: SCDs, ambulate  Code Status: Full Code  Disposition: Expected discharge likely 1/25 pending stable hemoglobin, anticoagulation plan in place.    Discussed with patient, bedside RN  Updated patient's son over the telephone 1/24.  >51 minutes spent by me on the date of service doing chart review, history, exam, documentation & further activities per the note.      Marry Cruz MD        Interval History:        Patient lying in bed.  Denies any chest pain or palpitations.  Denies any headache or dizziness.  No nausea or vomiting.  No active bleeding since this morning.  Has been n.p.o. for colonoscopy.  Procedure scheduled for this evening 3 PM.  Per report ambulating with walker.  No active bleeding.  Telemetry sinus tachycardia.           Physical Exam:        Physical Exam   Temp:  [97.8  F (36.6  C)-98.3  F (36.8  C)] 98.2  F (36.8  C)  Pulse:  [81-91] 85  Resp:  [11-22] 11  BP: (126-178)/(52-89) 126/52  SpO2:  [92 %-99 %] 97 %    Intake/Output Summary (Last 24 hours) at 1/23/2024 1607  Last data filed at 1/23/2024 0002  Gross per 24 hour   Intake 264 ml   Output --   Net 264 ml       Admission Weight: 81.6 kg (180 lb)  Current Weight:  81.6 kg (180 lb)    PHYSICAL EXAM  GENERAL: Patient is in no distress. Alert and oriented.  HEART: Regular rate and rhythm. S1S2.  LUNGS: Clear to auscultation bilaterally. No expiratory wheeze.  Respirations unlabored  ABDOMEN: Soft, no abdominal tenderness, bowel sounds heard   NEURO moving all extremities.  EXTREMITIES: No pedal edema.   SKIN: Warm, dry. No rash  PSYCHIATRY Cooperative       Medications:         amLODIPine  5 mg Oral Daily    atorvastatin  20 mg Oral At Bedtime    citalopram  40 mg Oral Daily    gabapentin  200 mg Oral TID    lisinopril  40 mg Oral Daily    magnesium oxide  400 mg Oral Q4H    potassium chloride  10 mEq Intravenous Q1H    sodium chloride (PF)  3 mL Intracatheter Q8H    sodium chloride (PF)  3 mL Intracatheter Q8H    sodium chloride (PF)  3 mL Intracatheter Q8H     acetaminophen **OR** acetaminophen, bisacodyl, fentaNYL, lidocaine 4%, lidocaine 4%, lidocaine 4%, lidocaine (buffered or not buffered), lidocaine (buffered or not buffered), lidocaine (buffered or not buffered), melatonin, midazolam, naloxone, naloxone, naloxone, naloxone, ondansetron **OR** ondansetron, ondansetron, polyethylene glycol, prochlorperazine **OR** prochlorperazine **OR** prochlorperazine, senna-docusate **OR** senna-docusate, sodium chloride (PF), sodium chloride (PF), sodium chloride (PF)         Data:      All new lab and imaging data was reviewed.

## 2024-01-24 NOTE — PHARMACY-ANTICOAGULATION SERVICE
Clinical Pharmacy - Warfarin Dosing Consult     Pharmacy has been consulted to manage this patient s warfarin therapy.  Indication: Atrial Fibrillation  Therapy Goal: INR 2-3  Warfarin Prior to Admission: Yes  Warfarin PTA Regimen: 5 mg on Monday, Wednesday and Friday; 10 mg on all other days  Dose Comments: s/p Colonoscopy for blood loss anemia  INR subtherapeutic    INR   Date Value Ref Range Status   01/23/2024 1.37 (H) 0.85 - 1.15 Final   01/22/2024 1.66 (H) 0.85 - 1.15 Final       Recommend warfarin 10 mg today.  Pharmacy will monitor Grace Jorgensen daily and order warfarin doses to achieve specified goal.      Please contact pharmacy as soon as possible if the warfarin needs to be held for a procedure or if the warfarin goals change.        Gianni RamírezD

## 2024-01-24 NOTE — PROGRESS NOTES
[facility]  Gastroenterology Progress Note     Grace Jorgensen MRN# [id number]   YOB: 1950 Age: 73 year old          Assessment and Plan:     Graec Jorgensen is a 73 year old female admitted on 1/22/2024 with PMHx of paroxysmal A-fib on warfarin, CVA, HTN, gastroparesis s/p gastric stimulator, hiatal hernia who presents with black stools and acute blood loss anemia.     Gastrointestinal hemorrhage with melena  Anemia, unspecified type  Gastroparesis  Hiatal hernia  Admission hgb 7.4 and relatively stable at 7.7 this a.m.(baseline hgb 9-10g/dL).  INR 1.86 on admission, this a.m. 1.37  Denies alcohol or NSAID use  No hx PUD per patient, has known hiatal hernia  1/23/24 EGD negative for source of blood loss.Hiatal hernia, otherwise normal.     - colonoscopy today  - NPO  - hold warfarin  - serial hemoglobin and transfuse for 7 or less            Gastrointestinal hemorrhage with melena  Anemia, unspecified type      Interval History:     no new complaints and doing well; no cp, sob, n/v/d, or abd pain.              Review of Systems:     C: NEGATIVE for fever, chills, change in weight  E/M: NEGATIVE for ear, mouth and throat problems  R: NEGATIVE for significant cough or SOB  CV: NEGATIVE for chest pain, palpitations or peripheral edema             Medications:   I have reviewed this patient's current medications   amLODIPine  5 mg Oral Daily    atorvastatin  20 mg Oral At Bedtime    citalopram  40 mg Oral Daily    gabapentin  200 mg Oral TID    lisinopril  40 mg Oral Daily    pantoprazole  40 mg Intravenous Q12H    potassium chloride  40 mEq Oral Once    sodium chloride (PF)  3 mL Intracatheter Q8H    sodium chloride (PF)  3 mL Intracatheter Q8H                  Physical Exam:   Vitals were reviewed  Vital Signs with Ranges  Temp:  [97.8  F (36.6  C)-98.3  F (36.8  C)] 98  F (36.7  C)  Pulse:  [61-92] 81  Resp:  [9-46] 18  BP: (118-179)/(55-87) 138/73  FiO2 (%):  [2 %] 2  %  SpO2:  [91 %-99 %] 92 %  No intake/output data recorded.  Constitutional: healthy, alert, and no distress   Cardiovascular: negative, PMI normal. No lifts, heaves, or thrills. RRR. No murmurs, clicks gallops or rub  Respiratory: negative, Percussion normal. Good diaphragmatic excursion. Lungs clear  Abdomen: Abdomen soft, non-tender. BS normal. No masses, organomegaly             Data:   I reviewed the patient's new clinical lab test results.   Recent Labs   Lab Test 01/24/24  0637 01/23/24  2252 01/23/24  1208 01/23/24  0635 01/23/24  0004 01/22/24  1801 01/22/24  1556 10/25/23  0609 10/23/23  1458 10/23/23  0557 10/22/23  1155 10/21/23  0644   WBC 7.7  --   --   --   --   --  9.4  --   --   --   --  5.9   HGB 7.7* 8.2* 7.8* 7.2*   < > 6.9* 7.4*  --   --   --   --  9.6*   MCV 78  --   --   --   --   --  80  --   --   --   --  82   *  --   --   --   --   --  580*  --   --  416  --  390   INR  --   --   --  1.37*  --  1.66*  --  1.86*   < >  --    < > 2.20*    < > = values in this interval not displayed.     Recent Labs   Lab Test 01/24/24  0637 01/22/24  1556 10/25/23  0609 10/22/23  0641 10/21/23  0644   POTASSIUM 3.2* 4.1 3.9   < > 4.1   CHLORIDE 107 102  --   --  102   CO2 18* 22  --   --  20*   BUN 7.1* 15.6  --   --  14.1   ANIONGAP 12 15  --   --  12    < > = values in this interval not displayed.     Recent Labs   Lab Test 01/22/24  1556 10/20/23  1642   ALBUMIN 3.9 3.9   BILITOTAL 0.2 <0.2   ALT 19 14   AST 29 23       I reviewed the patient's new imaging results.    All laboratory data reviewed  All imaging studies reviewed by me.    Rupinder Rojo PA-C,  1/24/2024  [facility]  Office : [phone]  Cell: [phone] (Dr. Dotson)  Cell: [phone] (Rupinder Rojo PA-C)

## 2024-01-25 ENCOUNTER — APPOINTMENT (OUTPATIENT)
Dept: PHYSICAL THERAPY | Facility: CLINIC | Age: 74
DRG: 378 | End: 2024-01-25
Attending: HOSPITALIST
Payer: COMMERCIAL

## 2024-01-25 VITALS
OXYGEN SATURATION: 93 % | WEIGHT: 180 LBS | RESPIRATION RATE: 16 BRPM | SYSTOLIC BLOOD PRESSURE: 133 MMHG | DIASTOLIC BLOOD PRESSURE: 71 MMHG | TEMPERATURE: 98.2 F | HEART RATE: 81 BPM | BODY MASS INDEX: 30.9 KG/M2

## 2024-01-25 LAB
ANION GAP SERPL CALCULATED.3IONS-SCNC: 10 MMOL/L (ref 7–15)
BUN SERPL-MCNC: 7.9 MG/DL (ref 8–23)
CALCIUM SERPL-MCNC: 9 MG/DL (ref 8.8–10.2)
CHLORIDE SERPL-SCNC: 108 MMOL/L (ref 98–107)
CREAT SERPL-MCNC: 1.14 MG/DL (ref 0.51–0.95)
DEPRECATED HCO3 PLAS-SCNC: 22 MMOL/L (ref 22–29)
EGFRCR SERPLBLD CKD-EPI 2021: 50 ML/MIN/1.73M2
ERYTHROCYTE [DISTWIDTH] IN BLOOD BY AUTOMATED COUNT: 20 % (ref 10–15)
GLUCOSE SERPL-MCNC: 109 MG/DL (ref 70–99)
HCT VFR BLD AUTO: 26.3 % (ref 35–47)
HGB BLD-MCNC: 7.8 G/DL (ref 11.7–15.7)
INR PPP: 1.17 (ref 0.85–1.15)
MAGNESIUM SERPL-MCNC: 2.3 MG/DL (ref 1.7–2.3)
MCH RBC QN AUTO: 24.1 PG (ref 26.5–33)
MCHC RBC AUTO-ENTMCNC: 29.7 G/DL (ref 31.5–36.5)
MCV RBC AUTO: 81 FL (ref 78–100)
PLATELET # BLD AUTO: 478 10E3/UL (ref 150–450)
POTASSIUM SERPL-SCNC: 3.6 MMOL/L (ref 3.4–5.3)
POTASSIUM SERPL-SCNC: 3.6 MMOL/L (ref 3.4–5.3)
RBC # BLD AUTO: 3.24 10E6/UL (ref 3.8–5.2)
SODIUM SERPL-SCNC: 140 MMOL/L (ref 135–145)
WBC # BLD AUTO: 7.7 10E3/UL (ref 4–11)

## 2024-01-25 PROCEDURE — 97161 PT EVAL LOW COMPLEX 20 MIN: CPT | Mod: GP

## 2024-01-25 PROCEDURE — 99239 HOSP IP/OBS DSCHRG MGMT >30: CPT | Performed by: HOSPITALIST

## 2024-01-25 PROCEDURE — 250N000013 HC RX MED GY IP 250 OP 250 PS 637: Performed by: HOSPITALIST

## 2024-01-25 PROCEDURE — 97530 THERAPEUTIC ACTIVITIES: CPT | Mod: GP

## 2024-01-25 PROCEDURE — 85610 PROTHROMBIN TIME: CPT | Performed by: HOSPITALIST

## 2024-01-25 PROCEDURE — 83735 ASSAY OF MAGNESIUM: CPT | Performed by: HOSPITALIST

## 2024-01-25 PROCEDURE — 36415 COLL VENOUS BLD VENIPUNCTURE: CPT | Performed by: HOSPITALIST

## 2024-01-25 PROCEDURE — 80048 BASIC METABOLIC PNL TOTAL CA: CPT | Performed by: HOSPITALIST

## 2024-01-25 PROCEDURE — 85014 HEMATOCRIT: CPT | Performed by: HOSPITALIST

## 2024-01-25 RX ORDER — FERROUS GLUCONATE 324(38)MG
324 TABLET ORAL
Qty: 30 TABLET | Refills: 0 | Status: SHIPPED | OUTPATIENT
Start: 2024-01-26

## 2024-01-25 RX ORDER — AMOXICILLIN 250 MG
1 CAPSULE ORAL DAILY
Qty: 15 TABLET | Refills: 0 | Status: SHIPPED | OUTPATIENT
Start: 2024-01-25 | End: 2024-02-09

## 2024-01-25 RX ORDER — AMLODIPINE BESYLATE 5 MG/1
5 TABLET ORAL ONCE
Status: COMPLETED | OUTPATIENT
Start: 2024-01-25 | End: 2024-01-25

## 2024-01-25 RX ORDER — WARFARIN SODIUM 7.5 MG/1
7.5 TABLET ORAL
Status: DISCONTINUED | OUTPATIENT
Start: 2024-01-25 | End: 2024-01-25 | Stop reason: HOSPADM

## 2024-01-25 RX ORDER — METOPROLOL TARTRATE 25 MG/1
12.5 TABLET, FILM COATED ORAL 2 TIMES DAILY
Qty: 10 TABLET | Refills: 0 | Status: SHIPPED | OUTPATIENT
Start: 2024-01-25 | End: 2024-01-25

## 2024-01-25 RX ORDER — POTASSIUM CHLORIDE 1500 MG/1
20 TABLET, EXTENDED RELEASE ORAL ONCE
Status: COMPLETED | OUTPATIENT
Start: 2024-01-25 | End: 2024-01-25

## 2024-01-25 RX ORDER — LISINOPRIL 40 MG/1
TABLET ORAL
COMMUNITY
Start: 2024-01-25

## 2024-01-25 RX ORDER — WARFARIN SODIUM 5 MG/1
TABLET ORAL
COMMUNITY
Start: 2024-01-25

## 2024-01-25 RX ORDER — POLYETHYLENE GLYCOL 3350 17 G/17G
17 POWDER, FOR SOLUTION ORAL 2 TIMES DAILY PRN
Qty: 30 PACKET | Refills: 0 | Status: SHIPPED | OUTPATIENT
Start: 2024-01-25

## 2024-01-25 RX ORDER — FERROUS GLUCONATE 324(38)MG
324 TABLET ORAL
Status: DISCONTINUED | OUTPATIENT
Start: 2024-01-25 | End: 2024-01-25 | Stop reason: HOSPADM

## 2024-01-25 RX ORDER — AMLODIPINE BESYLATE 5 MG/1
10 TABLET ORAL DAILY
COMMUNITY
Start: 2024-01-25

## 2024-01-25 RX ORDER — WARFARIN SODIUM 5 MG/1
10 TABLET ORAL DAILY
COMMUNITY
Start: 2024-01-25

## 2024-01-25 RX ADMIN — CITALOPRAM HYDROBROMIDE 40 MG: 20 TABLET ORAL at 09:28

## 2024-01-25 RX ADMIN — GABAPENTIN 200 MG: 100 CAPSULE ORAL at 13:57

## 2024-01-25 RX ADMIN — AMLODIPINE BESYLATE 5 MG: 5 TABLET ORAL at 13:57

## 2024-01-25 RX ADMIN — POTASSIUM CHLORIDE 40 MEQ: 1500 TABLET, EXTENDED RELEASE ORAL at 00:19

## 2024-01-25 RX ADMIN — POTASSIUM CHLORIDE 20 MEQ: 1500 TABLET, EXTENDED RELEASE ORAL at 09:30

## 2024-01-25 RX ADMIN — AMLODIPINE BESYLATE 5 MG: 5 TABLET ORAL at 09:28

## 2024-01-25 RX ADMIN — GABAPENTIN 200 MG: 100 CAPSULE ORAL at 09:28

## 2024-01-25 RX ADMIN — FERROUS GLUCONATE 324 MG: 324 TABLET ORAL at 13:57

## 2024-01-25 ASSESSMENT — ACTIVITIES OF DAILY LIVING (ADL)
ADLS_ACUITY_SCORE: 27
DEPENDENT_IADLS:: TRANSPORTATION;LAUNDRY
ADLS_ACUITY_SCORE: 27
ADLS_ACUITY_SCORE: 26
ADLS_ACUITY_SCORE: 27
ADLS_ACUITY_SCORE: 27

## 2024-01-25 NOTE — PLAN OF CARE
PRIMARY Concern: Black tarry stool  SAFETY RISK Concerns (fall risk, behaviors, etc.): Fall Risk      Isolation/Type: n/a  Tests/Procedures for NEXT shift: Hgb and potassium check  Consults? (Pending/following, signed-off?) GI following.   Where is patient from? (Home, TCU, etc.): Home w/ son  Other Important info for NEXT shift: Calm and cooperative this shift.  Anticipated DC date & active delays: TBD    SUMMARY NOTE:  Orientation/Cognitive: A&Ox4  Observation Goals (Met/ Not Met): Inpatient  Mobility Level/Assist Equipment: SBA w/ walker   Antibiotics & Plan (IV/po, length of tx left): n/a  Pain Management: C/o b/l leg pain, PRN Tylenol x1  Tele/VS/O2: VSS on RA, no BP on L arm. Tele: NSR- ST at times  ABNL Lab/BG: Hgb 7.7, K+ 3.1, to be replaced.  Diet: Mech soft  Bowel/Bladder: Cont. B&B up to BR   Skin Concerns: scattered bruises  Drains/Devices: PIV SL  Patient Stated Goal for Today: Go Home

## 2024-01-25 NOTE — DISCHARGE SUMMARY
Discharge Summary  Hospitalist    Date of Admission:  1/22/2024  Date of Discharge:  1/25/2024  Discharging Provider: Marry Cruz MD    Primary Care Physician   [facility]  Primary Care Provider Phone Number: [phone]  Primary Care Provider Fax Number: [phone]    PRINCIPAL DIAGNOSIS  Suspect GI bleed resolved.  Acute on chronic anemia secondary to blood loss.  Status post 1 unit blood transfusion  Iron deficiency.  Multiple diverticula, internal hemorrhoids.  Constipation.  Hypokalemia from bowel prep.  Thrombocytosis likely reactive.  Physical deconditioning from medical illness, senile frailty.  Obesity with a BMI of 30.90.    Past Medical History:   Diagnosis Date    Cerebral infarction (H)     Depressive disorder     Diabetes (H)     History of blood transfusion     Hypertension        History of Present Illness   Grace Jorgensen is an 73 year old female who presented with black stools.     Hospital Course     Grace Jorgensen is a 73 year old female admitted on 1/22/2024. Past history of paroxysmal A-fib on warfarin, CVA, HTN, gastroparesis s/p gastric stimulator, hiatal hernia admitted on 1/22/2024 with dark-colored stools.     Suspect GI bleed resolved.  Acute on chronic anemia secondary to blood loss.  Status post 1 unit blood transfusion  Iron deficiency.  Multiple diverticula, internal hemorrhoids.  Known hiatal hernia.  Constipation.  *Presented with 2 black stools in 5 days prior to admission.   Hgb 7.4 down from 9.6 when last checked 10/21/23 (baseline hgb 9-10 g/dL)  *Stopped taking warfarin 3 days prior to arrival, admission INR 1.86  *denied alcohol or NSAID use.  Patient reports she moves her bowels approximately 2-3 times every week.    *no hx PUD per patient, has known hiatal hernia.  -- On admission slightly tachycardic, hemoglobin dropped to 6.9.    Iron saturation index 13, occult blood positive.  --EGD 1/23 with normal esophagus, normal stomach,  normal duodenum.  No specimens collected.  --1/24/24 Colonoscopy multiple diverticula, internal hemorrhoids. NO source of bleeding.   --Received 1 unit blood transfusion on 1/23, IV Venofer on 1/24.    --[facility] gastroenterology followed, Okay with resumption of Coumadin on 1/24.  --Coumadin resumed 1/24, no active bleeding.  --Hemoglobin has been stable in the 7 range.  Plan for discharge with close follow-up.  Continue PTA PPI.  Oral iron supplements on discharge.  Scheduled docusate senna once a day, hold for loose stools.  As needed MiraLAX for constipation.  Follow up with primary care provider, [facility], within 7 days for hospital follow- up.  The following labs/tests are recommended: Follow hemoglobin, Platelets, BMP, INR in 3 days or earlier if symptomatic.  Follow-up with [facility] in clinic per schedule.    Hypokalemia from bowel prep.  Serum potassium 3.2.> 3.6 with replacement.  Monitor BMP in 3 to 5 days.    Thrombocytosis likely reactive.  Noted platelets of 580 on admission, trending down to 494.  Monitor in 3-5 days     Paroxysmal A-fib on anticoagulation  Patient denies any chest pain or palpitations or shortness of breath at this time.  Telemetry monitoring during hospital stay no acute events.    --Coumadin initially held during hospital stay, resumed on 1/24 after colonoscopy.  --Take 10 mg coumadin by mouth daily on 1/25, 1/26 and check INR on 1/27 and accordingly optimize dose.   --Subtherapeutic INR at 1.17 on day of discharge.  Goal INR between 2-3.  Monitor INR in 3 days and optimize dose of Coumadin.   --Discussed risks of subtherapeutic INR with patient and family.     Hypertension.  Has had drop in blood pressure on 1/25 prior to colonoscopy intervention to systolic of 80s.  Subsequently blood pressure improved.  Hold prior to admission lisinopril until recheck kidney function in 3 to 5 days given acute kidney injury.  Increased dose of prior to  admission amlodipine from 5 mg to 10 mg oral daily.  Hold if systolic blood pressure less than 110.  Monitor home blood pressure readings, review on provider visit and optimize therapy.  Continue PTA statin therapy.       DM II with recent hemoglobin A1c of 6.2.  History of neuropathy.  Resume PTA metformin on discharge.  Continue PTA gabapentin  Monitor blood sugars periodically.  Consider carbohydrate controlled diet as able to.    Hx CVA with chronic dizziness  Coumadin held initially, resumed on 1/24 after colonoscopy.  Dosing as above.  Continue PTA statin therapy.     Hx temporal arteritis vs trigeminal neuralgia  *hospitalized 10/2023, refused temporal artery biopsy  - continue PTA gabapentin     History of mild cognitive impairment.  On chart review in October 2023 slums of 23/30.  Recommend neurocognitive screening as outpatient.   Defer management to primary care provider.  Fall precautions, delirium precautions    History of anxiety, depression.  Continue PTA Celexa.    Physical deconditioning from medical illness, senile frailty.  Lives at home with her son.  PT recommending home with home care.  Patient adamantly declines home care services.  Care team involved during hospital stay.  Consider on PCP visit if concerns.  Fall precautions.    Obesity with a BMI of 30.90.  Consider lifestyle modification with diet and exercise as able to.    Marry Cruz MD.    Pending Results   Unresulted Labs Ordered in the Past 30 Days of this Admission       No orders found from 12/23/2023 to 1/23/2024.               Physical Exam   Vitals:    01/22/24 1540   Weight: 81.6 kg (180 lb)     Vital Signs with Ranges  Temp:  [97.7  F (36.5  C)-98.8  F (37.1  C)] 98.7  F (37.1  C)  Pulse:  [69-98] 83  Resp:  [11-22] 16  BP: ()/(34-89) 149/72  SpO2:  [90 %-99 %] 91 %  No intake/output data recorded.  PHYSICAL EXAM  GENERAL: Patient is in no distress. Alert and oriented.  LUNGS: Clear to auscultation bilaterally. No  expiratory wheeze.  Respirations unlabored  ABDOMEN: Soft, no abdominal tenderness, bowel sounds heard   NEURO moving all extremities.  EXTREMITIES: No pedal edema.   SKIN: Warm, dry. No rash  PSYCHIATRY Cooperative  )Consultations This Hospital Stay   GASTROENTEROLOGY IP CONSULT  VASCULAR ACCESS ADULT IP CONSULT  PHYSICAL THERAPY ADULT IP CONSULT  CARE MANAGEMENT / SOCIAL WORK IP CONSULT  PHARMACY TO DOSE WARFARIN    Time Spent on this Encounter   Marry TAM MD, personally saw the patient today and spent greater than 30 minutes discharging this patient. Discussed with patient, bedside RN.  Updated patient's son on discharge planning 1/24    Discharge Disposition   Discharged to home  Condition at discharge: Good    Discharge Orders      Reason for your hospital stay    You were admitted to the hospital with dark-colored stool.  Followed by gastroenterology underwent EGD colonoscopy.     Activity    Your activity upon discharge: activity as tolerated     Discharge Instructions    Consider lifestyle modification with diet and exercise as able to.    Fall precautions.  Bowel meds to prevent constipation.     Monitor and record    Hold prior to admission lisinopril until recheck kidney function in 3 to 5 days given acute kidney injury.  Increased dose of prior to admission amlodipine from 5 mg to 10 mg oral daily.  Hold if systolic blood pressure less than 110.  Monitor home blood pressure readings, review on provider visit and optimize therapy.      Monitor blood sugars periodically.     Follow-up and recommended labs and tests     Follow up with primary care provider, [facility], within 7 days for hospital follow- up.  The following labs/tests are recommended: Follow hemoglobin, Platelets, BMP, INR in 3 days or earlier if symptomatic. Subtherapeutic INR at 1.17 on day of discharge.  Goal INR between 2-3.  Monitor INR in 3 days and optimize dose of Coumadin.     Follow-up with [facility] in clinic per schedule.    Consider neurocognitive screening as outpatient.  Age-appropriate health maintenance on PCP visit     Diet    Low-salt, low-fat, carbohydrate controlled diet encouraged       Discharge Medications   Current Discharge Medication List        START taking these medications    Details   ferrous gluconate (FERGON) 324 (38 Fe) MG tablet Take 1 tablet (324 mg) by mouth daily (with breakfast)  Qty: 30 tablet, Refills: 0    Associated Diagnoses: Gastrointestinal hemorrhage with melena; Anemia, unspecified type; Physical deconditioning      polyethylene glycol (MIRALAX) 17 g packet Take 17 g by mouth 2 times daily as needed for constipation  Qty: 30 packet, Refills: 0    Associated Diagnoses: Gastrointestinal hemorrhage with melena      senna-docusate (SENOKOT-S/PERICOLACE) 8.6-50 MG tablet Take 1 tablet by mouth daily for 15 days Hold for loose stools  Qty: 15 tablet, Refills: 0    Associated Diagnoses: Gastrointestinal hemorrhage with melena           CONTINUE these medications which have CHANGED    Details   amLODIPine (NORVASC) 5 MG tablet Take 2 tablets (10 mg) by mouth daily Dose increased to 10 mg      lisinopril (ZESTRIL) 40 MG tablet Hold until recheck kidney function in 3-5 days      !! warfarin ANTICOAGULANT (COUMADIN) 5 MG tablet Hold until INR therapeutic.      !! warfarin ANTICOAGULANT (COUMADIN) 5 MG tablet Take 2 tablets (10 mg) by mouth daily Take 10 mg by mouth daily on 1/25, 1/26 and check INR on 1/27 and accordingly optimize dose.       !! - Potential duplicate medications found. Please discuss with provider.        CONTINUE these medications which have NOT CHANGED    Details   atorvastatin (LIPITOR) 20 MG tablet Take 20 mg by mouth at bedtime      citalopram (CELEXA) 40 MG tablet Take 40 mg by mouth daily      gabapentin (NEURONTIN) 100 MG capsule Take 200 mg by mouth 3 times daily Patient taking 200 mg as needed for nerve pain      metFORMIN (GLUCOPHAGE) 500 MG  tablet Take 500 mg by mouth daily (with breakfast)      multivitamin w/minerals (THERA-VIT-M) tablet Take 1 tablet by mouth daily    Associated Diagnoses: Iron deficiency anemia, unspecified iron deficiency anemia type      pantoprazole (PROTONIX) 20 MG EC tablet Take 40 mg by mouth every evening Patient taking 4 tablets (80 mg) once daily      albuterol (PROAIR HFA/PROVENTIL HFA/VENTOLIN HFA) 108 (90 Base) MCG/ACT inhaler Inhale 2 puffs into the lungs every 6 hours as needed for shortness of breath, wheezing or cough      ketoconazole (NIZORAL) 2 % external shampoo Every two weeks prn           Allergies   Allergies   Allergen Reactions    Bupropion GI Disturbance       DATA  Most Recent 3 CBC's:  Recent Labs   Lab Test 01/25/24  0611 01/24/24  0637 01/23/24  2252 01/22/24  1801 01/22/24  1556   WBC 7.7 7.7  --   --  9.4   HGB 7.8* 7.7* 8.2*   < > 7.4*   MCV 81 78  --   --  80   * 494*  --   --  580*    < > = values in this interval not displayed.      Most Recent 3 BMP's:  Recent Labs   Lab Test 01/25/24  0619 01/24/24  2223 01/24/24  1711 01/24/24  0637 01/23/24  1236 01/22/24  1556     --   --  137  --  139   POTASSIUM 3.6  3.6 3.1* 3.2* 3.2*  --  4.1   CHLORIDE 108*  --   --  107  --  102   CO2 22  --   --  18*  --  22   BUN 7.9*  --   --  7.1*  --  15.6   CR 1.14*  --   --  0.78  --  0.83   ANIONGAP 10  --   --  12  --  15   SERA 9.0  --   --  9.1  --  9.5   *  --   --  127* 183* 122*     Most Recent 2 LFT's:  Recent Labs   Lab Test 01/22/24  1556 10/20/23  1642   AST 29 23   ALT 19 14   ALKPHOS 59 86   BILITOTAL 0.2 <0.2

## 2024-01-25 NOTE — PLAN OF CARE
Goal Outcome Evaluation:       PRIMARY Concern: Black tarry stool  SAFETY RISK Concerns (fall risk, behaviors, etc.): Fall Risk      Isolation/Type: n/a  Tests/Procedures for NEXT shift: Hgb check  Consults? (Pending/following, signed-off?) GI following.   Where is patient from? (Home, TCU, etc.): Home w/ son  Other Important info for NEXT shift: Shift lab draws delayed by lab.  Anticipated DC date & active delays: TBD    SUMMARY NOTE: 01/24-01/25   [id number]  Orientation/Cognitive: A&Ox4  Observation Goals (Met/ Not Met): Inpatient  Mobility Level/Assist Equipment: SBA w/ walker   Antibiotics & Plan (IV/po, length of tx left): n/a  Pain Management: C/o b/l leg pain, PRN Tylenol available.  Tele/VS/O2: VSS on RA, no BP on L arm. Tele: Sinus tachy  ABNL Lab/BG: Hgb 7.8, K+ 3.6.  Diet: Mech soft  Bowel/Bladder: Cont. B&B up to BR   Skin Concerns: scattered bruises  Drains/Devices: PIV SL  Patient Stated Goal for Today: Go Home

## 2024-01-25 NOTE — CONSULTS
Care Management Initial Consult    General Information  Assessment completed with: Patient,    Type of CM/SW Visit: Offer D/C Planning    Primary Care Provider verified and updated as needed: Yes   Readmission within the last 30 days:        Reason for Consult: discharge planning  Advance Care Planning:            Communication Assessment  Patient's communication style: spoken language (English or Bilingual)    Hearing Difficulty or Deaf: no   Wear Glasses or Blind: yes    Cognitive  Cognitive/Neuro/Behavioral: WDL                      Living Environment:   People in home: child(george), adult     Current living Arrangements: house      Able to return to prior arrangements:         Family/Social Support:  Care provided by:    Provides care for:    Marital Status: Single             Description of Support System:           Current Resources:   Patient receiving home care services: No     Community Resources:    Equipment currently used at home: walker, rolling  Supplies currently used at home:      Employment/Financial:  Employment Status: retired        Financial Concerns:             Does the patient's insurance plan have a 3 day qualifying hospital stay waiver?  No    Lifestyle & Psychosocial Needs:  Social Determinants of Health     Food Insecurity: Not on file   Depression: Not on file   Housing Stability: Not on file   Tobacco Use: Medium Risk (1/24/2024)    Patient History     Smoking Tobacco Use: Former     Smokeless Tobacco Use: Never     Passive Exposure: Not on file   Financial Resource Strain: Not on file   Alcohol Use: Not on file   Transportation Needs: Not on file   Physical Activity: Not on file   Interpersonal Safety: Not on file   Stress: Not on file   Social Connections: Not on file       Functional Status:  Prior to admission patient needed assistance:   Dependent ADLs:: Independent  Dependent IADLs:: Transportation, Laundry       Mental Health Status:          Chemical Dependency Status:                 Values/Beliefs:  Spiritual, Cultural Beliefs, Confucianism Practices, Values that affect care:                 Additional Information:  Met with patient to discuss role in discharge planning. Pt lives indep in home with son.  She notes son works during the day () but he assists with household tasks and can help her as needed.  They share in cooking, son does laundry (in basement and Pt doesn't go down steps).  Pt has a lift chair. Pt's PCP is Dr. Duffy at [facility].  Sister assists with transport to appointments.     Reviewed recommendation for HHC.  Pt states she doesn't like people coming into her house and feels her son can help her with anything she needs. Did attempt to explain what HHC can provide but Pt still declined.   Eager to go home today.     MD paged to update to cancel HHC referral as Pt doesn't want.  Bedside RN in room and updated.     Baylee Raymond, RN

## 2024-01-25 NOTE — PROGRESS NOTES
Care Management Discharge Note    Discharge Date: 01/25/2024       Discharge Disposition: Home    Discharge Services:      Discharge DME:      Discharge Transportation: family or friend will provide    Private pay costs discussed: Not applicable    Does the patient's insurance plan have a 3 day qualifying hospital stay waiver?  No    PAS Confirmation Code:    Patient/family educated on Medicare website which has current facility and service quality ratings:      Education Provided on the Discharge Plan:    Persons Notified of Discharge Plans: Pt/bedside RN/MD  Patient/Family in Agreement with the Plan: yes    Handoff Referral Completed: No    Additional Information:  Pt discharge to home this afternoon.  Declines C, referral cancelled    Pt wishes to make own Follow-up with PCP.  Family will transport.     Bedside RN to review AVS.     Baylee Raymond RN

## 2024-01-25 NOTE — PROGRESS NOTES
01/25/24 0948   Appointment Info   Signing Clinician's Name / Credentials (PT) Blake Pitts DPT   Living Environment   People in Home child(george), adult   Current Living Arrangements house   Home Accessibility stairs to enter home   Number of Stairs, Main Entrance 3   Stair Railings, Main Entrance none   Transportation Anticipated family or friend will provide   Living Environment Comments Reports living in a house w/ her son. Reports 3 STEPHANIE w/o rails but hold onto the house. Reports someone always with her when going up and down the stairs. Reports son does not allow her to go down stairs and go out and walk by herself.   Self-Care   Usual Activity Tolerance moderate   Current Activity Tolerance fair   Equipment Currently Used at Home walker, rolling   Fall history within last six months no   Activity/Exercise/Self-Care Comment Reports baseline uses a 4WW outside the home and no AD inside the home. Gets assistance w/ cooking and cleaning. Takes sponge baths at kitchen sink. Most part able to dress self otherwise son helps.   General Information   Onset of Illness/Injury or Date of Surgery 01/22/24   Referring Physician Marry Cruz MD   Patient/Family Therapy Goals Statement (PT) Return to home   Pertinent History of Current Problem (include personal factors and/or comorbidities that impact the POC) Grace Jorgensen is a 73 year old female admitted on 1/22/2024. Past history of paroxysmal A-fib on warfarin, CVA, HTN, gastroparesis s/p gastric stimulator, hiatal hernia who presents with black stools.   Existing Precautions/Restrictions fall   Cognition   Affect/Mental Status (Cognition) WFL   Orientation Status (Cognition) oriented x 4   Follows Commands (Cognition) WFL   Pain Assessment   Patient Currently in Pain No   Range of Motion (ROM)   ROM Comment WFLs for mobility and transfers no formal testing complete   Strength (Manual Muscle Testing)   Strength Comments WFLs for mobility and transfers no formal  testing complete   Bed Mobility   Comment, (Bed Mobility) Sit to supine w/ SBA   Transfers   Comment, (Transfers) Sit to stand from toilet w/ independence   Gait/Stairs (Locomotion)   Comment, (Gait/Stairs) 10 ft w/ 4WW and SBA   Balance   Balance Comments No overt LOB noted   Clinical Impression   Criteria for Skilled Therapeutic Intervention Yes, treatment indicated   PT Diagnosis (PT) Impaired ambulation   Influenced by the following impairments Impaired strength, balance and activity tolerance   Functional limitations due to impairments Impaired ADLs, IADLs and functional mobility   Clinical Presentation (PT Evaluation Complexity) stable   Clinical Presentation Rationale Clinical judgment   Clinical Decision Making (Complexity) low complexity   Planned Therapy Interventions (PT) balance training;bed mobility training;gait training;home exercise program;patient/family education;stair training;strengthening;transfer training;progressive activity/exercise;neuromuscular re-education   Risk & Benefits of therapy have been explained evaluation/treatment results reviewed;care plan/treatment goals reviewed;risks/benefits reviewed;current/potential barriers reviewed;participants voiced agreement with care plan;participants included;patient   PT Total Evaluation Time   PT Eval, Low Complexity Minutes (23467) 10   Physical Therapy Goals   PT Frequency 5x/week   PT Predicted Duration/Target Date for Goal Attainment 02/04/24   PT Goals Bed Mobility;Transfers;Gait;Stairs   PT: Bed Mobility Independent;Supine to/from sit   PT: Transfers Modified independent;Sit to/from stand;Assistive device   PT: Gait Modified independent;150 feet;Rolling walker   PT: Stairs Minimal assist;3 stairs   Interventions   Interventions Quick Adds Therapeutic Activity   Therapeutic Activity   Therapeutic Activities: dynamic activities to improve functional performance Minutes (30925) 10   Symptoms Noted During/After Treatment Fatigue   Treatment  Detail/Skilled Intervention Pt in bathroom at start of session. RN in room. Pt completing bathrooming independently w/ door closed. Walker outsid of bathroom. Pt ambulating to 4WW w/o assistance and then ambulating from 4WW to bed w/ SBA. Sit to stand x1 w/ 4WW and SBA and x1 w/ no AD and SBA. Pt ambulating ~125 ft w/ 4WW and SBA. Fair andra throughout. Running into a few objects on R w/ 4WW. No overt LOB noted. Reports fatigued w/ activity. Pt then ambulating ~10 ft w/ no AD and SBA. No overt LOB noted. Not reaching for items in room. Declining OOB activity. Supine in bed at end of session w/ call light in place and bed alarm on.   PT Discharge Planning   PT Plan Trial stairs, amb distance, activity tolerance, HEP   PT Discharge Recommendation (DC Rec) home with assist;home with home care physical therapy   PT Rationale for DC Rec Pt at or near baseline mobility. Lives in house w/ son. Few stairs to enter w/o rail. Son very supportive. Uses a 4WW for mobility outside the home and no AD inside the home. Pt able to ambulate w/ and w/o AD and SBA. Would recommend DC to home when medically ready and HHPT. Pt unsure if she wants HHPT as she notes she does not like strangers in her home.   PT Brief overview of current status SBA w/ 4WW   Total Session Time   Timed Code Treatment Minutes 10   Total Session Time (sum of timed and untimed services) 20

## 2024-01-25 NOTE — PLAN OF CARE
[id number]:    PRIMARY Concern: Black tarry stool  SAFETY RISK Concerns (fall risk, behaviors, etc.): Fall Risk      Isolation/Type: n/a  Tests/Procedures for NEXT shift: trending Hgb, continue to drink potassium replacement, electrolyte rechecks  Consults? (Pending/following, signed-off?) GI following.   Where is patient from? (Home, TCU, etc.): Home w/ son  Other Important info for NEXT shift: returned from colonoscopy around 1600. needs frequent encouragement to drink potassium replacement  Anticipated DC date & active delays: Tomorrow 1/25/23 pending lab stability  ___________________________________________  SUMMARY NOTE:  Orientation/Cognitive: A&Ox4  Observation Goals (Met/ Not Met): Inpatient  Mobility Level/Assist Equipment: SBA w/ walker  Antibiotics & Plan (IV/po, length of tx left): n/a  Pain Management: Denies  Tele/VS/O2: VSS on RA, no BP on L arm d/t metal janie. Tele:NSR  ABNL Lab/BG: Hgb 7.7, K+ 3.2 (recheck 2300), Mag 2.0 (finished replacing this janene, recheck in AM)  Diet: NPO  Bowel/Bladder: Cont. No BM since colonoscopy  Skin Concerns: n/a  Drains/Devices: PIV SL

## 2024-01-25 NOTE — PROGRESS NOTES
[facility]  Gastroenterology Progress Note     Grace Jorgensen MRN# [id number]   YOB: 1950 Age: 73 year old          Assessment and Plan:     Grace Jorgensen is a 73 year old female admitted on 1/22/2024 with PMHx of paroxysmal A-fib on warfarin, CVA, HTN, gastroparesis s/p gastric stimulator, hiatal hernia who presents with black stools and acute blood loss anemia.     Gastrointestinal hemorrhage with melena  Anemia, unspecified type  Gastroparesis  Hiatal hernia  Admission hgb 7.4 and relatively stable at 7.8 this a.m.(baseline hgb 9-10g/dL).  INR 1.86 on admission, this a.m. 1.37  Denies alcohol or NSAID use  No hx PUD per patient, has known hiatal hernia  1/23/24 EGD negative for source of blood loss.Hiatal hernia, otherwise normal.  1/24/24 Colonoscopy multiple diverticula, internal hemorrhoids. NO source of bleeding.    - ok to restart warfarin  - serial hemoglobin and transfuse for 7 or less  - regular diet  - ok with GI to discharge patient and will need capsule endoscopy as an outpatient                  Interval History:     no new complaints and doing well; no cp, sob, n/v/d, or abd pain.              Review of Systems:     C: NEGATIVE for fever, chills, change in weight  E/M: NEGATIVE for ear, mouth and throat problems  R: NEGATIVE for significant cough or SOB  CV: NEGATIVE for chest pain, palpitations or peripheral edema             Medications:   I have reviewed this patient's current medications   amLODIPine  5 mg Oral Daily    atorvastatin  20 mg Oral At Bedtime    citalopram  40 mg Oral Daily    gabapentin  200 mg Oral TID    lisinopril  40 mg Oral Daily    sodium chloride (PF)  3 mL Intracatheter Q8H    Warfarin Therapy Reminder  1 each Oral See Admin Instructions                  Physical Exam:   Vitals were reviewed  Vital Signs with Ranges  Temp:  [97.7  F (36.5  C)-98.8  F (37.1  C)] 98.8  F (37.1  C)  Pulse:  [69-98] 98  Resp:  [11-22] 16  BP:  ()/(34-89) 153/83  SpO2:  [90 %-99 %] 95 %  No intake/output data recorded.  Constitutional: healthy, alert, and no distress   Cardiovascular: negative, PMI normal. No lifts, heaves, or thrills. RRR. No murmurs, clicks gallops or rub  Respiratory: negative, Percussion normal. Good diaphragmatic excursion. Lungs clear  Abdomen: Abdomen soft, non-tender. BS normal. No masses, organomegaly             Data:   I reviewed the patient's new clinical lab test results.   Recent Labs   Lab Test 01/25/24  0611 01/24/24  0637 01/23/24  2252 01/23/24  1208 01/23/24  0635 01/23/24  0004 01/22/24  1801 01/22/24  1556 10/25/23  0609   WBC 7.7 7.7  --   --   --   --   --  9.4  --    HGB 7.8* 7.7* 8.2*   < > 7.2*   < > 6.9* 7.4*  --    MCV 81 78  --   --   --   --   --  80  --    * 494*  --   --   --   --   --  580*  --    INR  --   --   --   --  1.37*  --  1.66*  --  1.86*    < > = values in this interval not displayed.     Recent Labs   Lab Test 01/25/24  0619 01/24/24  2223 01/24/24  1711 01/24/24  0637 01/22/24  1556   POTASSIUM 3.6  3.6 3.1* 3.2* 3.2* 4.1   CHLORIDE 108*  --   --  107 102   CO2 22  --   --  18* 22   BUN 7.9*  --   --  7.1* 15.6   ANIONGAP 10  --   --  12 15     Recent Labs   Lab Test 01/22/24  1556 10/20/23  1642   ALBUMIN 3.9 3.9   BILITOTAL 0.2 <0.2   ALT 19 14   AST 29 23       I reviewed the patient's new imaging results.    All laboratory data reviewed  All imaging studies reviewed by me.    Rupinder Rojo PA-C,  1/24/2024  [facility]  Office : [phone]  Cell: [phone] (Dr. Dotsno)  Cell: [phone] (Rupinder Rojo PA-C)

## 2024-01-25 NOTE — DISCHARGE INSTRUCTIONS
[facility]  Office : [phone]  They will call for follow-up. If you do not hear from them within a week, please call their office number.

## 2024-01-26 NOTE — PLAN OF CARE
Physical Therapy Discharge Summary    Reason for therapy discharge:    Discharged to home.    Progress towards therapy goal(s). See goals on Care Plan in [facility] electronic health record for goal details.  Goals partially met.  Barriers to achieving goals:   discharge on same date as initial evaluation.    Therapy recommendation(s):    Continued therapy is recommended.  Rationale/Recommendations:  Pt at or near baseline mobility. Lives in house w/ son. Few stairs to enter w/o rail. Son very supportive. Uses a 4WW for mobility outside the home and no AD inside the home. Pt able to ambulate w/ and w/o AD and SBA. Would recommend DC to home when medically ready and HHPT. Pt unsure if she wants HHPT as she notes she does not like strangers in her home.  PT Brief overview of current status: SBA w/ 4WW    Recommendation above provided by last treating therapist.

## 2024-12-01 ENCOUNTER — APPOINTMENT (OUTPATIENT)
Dept: CT IMAGING | Facility: CLINIC | Age: 74
DRG: 640 | End: 2024-12-01
Attending: EMERGENCY MEDICINE
Payer: COMMERCIAL

## 2024-12-01 ENCOUNTER — HOSPITAL ENCOUNTER (INPATIENT)
Facility: CLINIC | Age: 74
LOS: 3 days | Discharge: HOME | DRG: 640 | End: 2024-12-04
Attending: EMERGENCY MEDICINE | Admitting: INTERNAL MEDICINE
Payer: COMMERCIAL

## 2024-12-01 DIAGNOSIS — E86.0 DEHYDRATION: ICD-10-CM

## 2024-12-01 DIAGNOSIS — R53.1 GENERALIZED WEAKNESS: ICD-10-CM

## 2024-12-01 DIAGNOSIS — I50.32 CHRONIC HEART FAILURE WITH PRESERVED EJECTION FRACTION (H): Primary | ICD-10-CM

## 2024-12-01 DIAGNOSIS — R79.89 ELEVATED LACTIC ACID LEVEL: ICD-10-CM

## 2024-12-01 LAB
ALBUMIN SERPL BCG-MCNC: 4.6 G/DL (ref 3.5–5.2)
ALBUMIN UR-MCNC: 600 MG/DL
ALP SERPL-CCNC: 95 U/L (ref 40–150)
ALT SERPL W P-5'-P-CCNC: 22 U/L (ref 0–50)
ANION GAP SERPL CALCULATED.3IONS-SCNC: 21 MMOL/L (ref 7–15)
APPEARANCE UR: CLEAR
AST SERPL W P-5'-P-CCNC: 34 U/L (ref 0–45)
ATRIAL RATE - MUSE: 106 BPM
BASE EXCESS BLDV CALC-SCNC: -2 MMOL/L (ref -3–3)
BASE EXCESS BLDV CALC-SCNC: -7 MMOL/L (ref -3–3)
BASOPHILS # BLD AUTO: 0 10E3/UL (ref 0–0.2)
BASOPHILS NFR BLD AUTO: 0 %
BILIRUB SERPL-MCNC: 0.4 MG/DL
BILIRUB UR QL STRIP: NEGATIVE
BUN SERPL-MCNC: 16.2 MG/DL (ref 8–23)
CALCIUM SERPL-MCNC: 9.5 MG/DL (ref 8.8–10.4)
CHLORIDE SERPL-SCNC: 89 MMOL/L (ref 98–107)
COLOR UR AUTO: ABNORMAL
CREAT SERPL-MCNC: 0.9 MG/DL (ref 0.51–0.95)
DIASTOLIC BLOOD PRESSURE - MUSE: NORMAL MMHG
EGFRCR SERPLBLD CKD-EPI 2021: 66 ML/MIN/1.73M2
EOSINOPHIL # BLD AUTO: 0.1 10E3/UL (ref 0–0.7)
EOSINOPHIL NFR BLD AUTO: 0 %
ERYTHROCYTE [DISTWIDTH] IN BLOOD BY AUTOMATED COUNT: 15.9 % (ref 10–15)
FLUAV RNA SPEC QL NAA+PROBE: NEGATIVE
FLUBV RNA RESP QL NAA+PROBE: NEGATIVE
GLUCOSE BLDC GLUCOMTR-MCNC: 174 MG/DL (ref 70–99)
GLUCOSE SERPL-MCNC: 171 MG/DL (ref 70–99)
GLUCOSE UR STRIP-MCNC: 150 MG/DL
HCO3 BLDV-SCNC: 17 MMOL/L (ref 21–28)
HCO3 BLDV-SCNC: 21 MMOL/L (ref 21–28)
HCO3 SERPL-SCNC: 18 MMOL/L (ref 22–29)
HCT VFR BLD AUTO: 36.6 % (ref 35–47)
HGB BLD-MCNC: 12.6 G/DL (ref 11.7–15.7)
HGB UR QL STRIP: ABNORMAL
HOLD SPECIMEN: NORMAL
IMM GRANULOCYTES # BLD: 0 10E3/UL
IMM GRANULOCYTES NFR BLD: 0 %
INR PPP: 2.14 (ref 0.85–1.15)
INTERPRETATION ECG - MUSE: NORMAL
KETONES UR STRIP-MCNC: NEGATIVE MG/DL
LACTATE BLD-SCNC: 2.6 MMOL/L
LACTATE BLD-SCNC: 5.3 MMOL/L
LEUKOCYTE ESTERASE UR QL STRIP: NEGATIVE
LIPASE SERPL-CCNC: 20 U/L (ref 13–60)
LYMPHOCYTES # BLD AUTO: 1.9 10E3/UL (ref 0.8–5.3)
LYMPHOCYTES NFR BLD AUTO: 15 %
MCH RBC QN AUTO: 28.1 PG (ref 26.5–33)
MCHC RBC AUTO-ENTMCNC: 34.4 G/DL (ref 31.5–36.5)
MCV RBC AUTO: 82 FL (ref 78–100)
MONOCYTES # BLD AUTO: 0.5 10E3/UL (ref 0–1.3)
MONOCYTES NFR BLD AUTO: 4 %
MUCOUS THREADS #/AREA URNS LPF: PRESENT /LPF
NEUTROPHILS # BLD AUTO: 9.8 10E3/UL (ref 1.6–8.3)
NEUTROPHILS NFR BLD AUTO: 80 %
NITRATE UR QL: NEGATIVE
NRBC # BLD AUTO: 0 10E3/UL
NRBC BLD AUTO-RTO: 0 /100
P AXIS - MUSE: 72 DEGREES
PCO2 BLDV: 28 MM HG (ref 40–50)
PCO2 BLDV: 33 MM HG (ref 40–50)
PH BLDV: 7.4 [PH] (ref 7.32–7.43)
PH BLDV: 7.42 [PH] (ref 7.32–7.43)
PH UR STRIP: 7.5 [PH] (ref 5–7)
PLATELET # BLD AUTO: 478 10E3/UL (ref 150–450)
PO2 BLDV: 24 MM HG (ref 25–47)
PO2 BLDV: 44 MM HG (ref 25–47)
POTASSIUM SERPL-SCNC: 4.3 MMOL/L (ref 3.4–5.3)
PR INTERVAL - MUSE: 162 MS
PROT SERPL-MCNC: 8.4 G/DL (ref 6.4–8.3)
QRS DURATION - MUSE: 90 MS
QT - MUSE: 376 MS
QTC - MUSE: 499 MS
R AXIS - MUSE: 34 DEGREES
RBC # BLD AUTO: 4.48 10E6/UL (ref 3.8–5.2)
RBC URINE: 6 /HPF
RSV RNA SPEC NAA+PROBE: NEGATIVE
SAO2 % BLDV: 43 % (ref 70–75)
SAO2 % BLDV: 80 % (ref 70–75)
SARS-COV-2 RNA RESP QL NAA+PROBE: NEGATIVE
SODIUM SERPL-SCNC: 128 MMOL/L (ref 135–145)
SP GR UR STRIP: 1.01 (ref 1–1.03)
SQUAMOUS EPITHELIAL: <1 /HPF
SYSTOLIC BLOOD PRESSURE - MUSE: NORMAL MMHG
T AXIS - MUSE: 70 DEGREES
TROPONIN T SERPL HS-MCNC: 239 NG/L
UROBILINOGEN UR STRIP-MCNC: NORMAL MG/DL
VENTRICULAR RATE- MUSE: 106 BPM
WBC # BLD AUTO: 12.3 10E3/UL (ref 4–11)
WBC URINE: 5 /HPF

## 2024-12-01 PROCEDURE — 250N000009 HC RX 250: Performed by: EMERGENCY MEDICINE

## 2024-12-01 PROCEDURE — 71260 CT THORAX DX C+: CPT

## 2024-12-01 PROCEDURE — 99285 EMERGENCY DEPT VISIT HI MDM: CPT | Mod: 25

## 2024-12-01 PROCEDURE — 250N000011 HC RX IP 250 OP 636: Performed by: EMERGENCY MEDICINE

## 2024-12-01 PROCEDURE — 36415 COLL VENOUS BLD VENIPUNCTURE: CPT | Performed by: EMERGENCY MEDICINE

## 2024-12-01 PROCEDURE — 74177 CT ABD & PELVIS W/CONTRAST: CPT

## 2024-12-01 PROCEDURE — 85018 HEMOGLOBIN: CPT | Performed by: EMERGENCY MEDICINE

## 2024-12-01 PROCEDURE — 83605 ASSAY OF LACTIC ACID: CPT

## 2024-12-01 PROCEDURE — 85610 PROTHROMBIN TIME: CPT | Performed by: EMERGENCY MEDICINE

## 2024-12-01 PROCEDURE — 84484 ASSAY OF TROPONIN QUANT: CPT | Performed by: EMERGENCY MEDICINE

## 2024-12-01 PROCEDURE — 96361 HYDRATE IV INFUSION ADD-ON: CPT

## 2024-12-01 PROCEDURE — 93005 ELECTROCARDIOGRAM TRACING: CPT

## 2024-12-01 PROCEDURE — 87040 BLOOD CULTURE FOR BACTERIA: CPT | Performed by: EMERGENCY MEDICINE

## 2024-12-01 PROCEDURE — 82803 BLOOD GASES ANY COMBINATION: CPT

## 2024-12-01 PROCEDURE — 83690 ASSAY OF LIPASE: CPT | Performed by: EMERGENCY MEDICINE

## 2024-12-01 PROCEDURE — 87637 SARSCOV2&INF A&B&RSV AMP PRB: CPT | Performed by: EMERGENCY MEDICINE

## 2024-12-01 PROCEDURE — 81001 URINALYSIS AUTO W/SCOPE: CPT | Performed by: EMERGENCY MEDICINE

## 2024-12-01 PROCEDURE — 82962 GLUCOSE BLOOD TEST: CPT

## 2024-12-01 PROCEDURE — 85004 AUTOMATED DIFF WBC COUNT: CPT | Performed by: EMERGENCY MEDICINE

## 2024-12-01 PROCEDURE — 258N000003 HC RX IP 258 OP 636: Performed by: EMERGENCY MEDICINE

## 2024-12-01 PROCEDURE — 120N000001 HC R&B MED SURG/OB

## 2024-12-01 PROCEDURE — 80053 COMPREHEN METABOLIC PANEL: CPT | Performed by: EMERGENCY MEDICINE

## 2024-12-01 PROCEDURE — 96374 THER/PROPH/DIAG INJ IV PUSH: CPT | Mod: 59

## 2024-12-01 RX ORDER — SUCRALFATE 1 G/1
1 TABLET ORAL 4 TIMES DAILY
COMMUNITY

## 2024-12-01 RX ORDER — RIZATRIPTAN BENZOATE 10 MG/1
10 TABLET, ORALLY DISINTEGRATING ORAL
COMMUNITY

## 2024-12-01 RX ORDER — ONDANSETRON 2 MG/ML
4 INJECTION INTRAMUSCULAR; INTRAVENOUS ONCE
Status: COMPLETED | OUTPATIENT
Start: 2024-12-01 | End: 2024-12-01

## 2024-12-01 RX ORDER — FUROSEMIDE 20 MG/1
20 TABLET ORAL DAILY PRN
COMMUNITY

## 2024-12-01 RX ORDER — SIMETHICONE 80 MG
80 TABLET,CHEWABLE ORAL EVERY 6 HOURS PRN
COMMUNITY

## 2024-12-01 RX ORDER — FLUTICASONE PROPIONATE 50 MCG
1 SPRAY, SUSPENSION (ML) NASAL DAILY PRN
COMMUNITY

## 2024-12-01 RX ORDER — IOPAMIDOL 755 MG/ML
91 INJECTION, SOLUTION INTRAVASCULAR ONCE
Status: COMPLETED | OUTPATIENT
Start: 2024-12-01 | End: 2024-12-01

## 2024-12-01 RX ADMIN — IOPAMIDOL 91 ML: 755 INJECTION, SOLUTION INTRAVENOUS at 22:32

## 2024-12-01 RX ADMIN — SODIUM CHLORIDE 2000 ML: 9 INJECTION, SOLUTION INTRAVENOUS at 20:43

## 2024-12-01 RX ADMIN — SODIUM CHLORIDE 68 ML: 9 INJECTION, SOLUTION INTRAVENOUS at 22:33

## 2024-12-01 RX ADMIN — ONDANSETRON 4 MG: 2 INJECTION INTRAMUSCULAR; INTRAVENOUS at 22:42

## 2024-12-01 ASSESSMENT — ACTIVITIES OF DAILY LIVING (ADL)
ADLS_ACUITY_SCORE: 57

## 2024-12-02 LAB
ALBUMIN SERPL BCG-MCNC: 4.1 G/DL (ref 3.5–5.2)
ALP SERPL-CCNC: 79 U/L (ref 40–150)
ALT SERPL W P-5'-P-CCNC: 20 U/L (ref 0–50)
ANION GAP SERPL CALCULATED.3IONS-SCNC: 13 MMOL/L (ref 7–15)
AST SERPL W P-5'-P-CCNC: 38 U/L (ref 0–45)
BASOPHILS # BLD AUTO: 0 10E3/UL (ref 0–0.2)
BASOPHILS NFR BLD AUTO: 0 %
BILIRUB SERPL-MCNC: 0.4 MG/DL
BUN SERPL-MCNC: 17.1 MG/DL (ref 8–23)
CALCIUM SERPL-MCNC: 8.7 MG/DL (ref 8.8–10.4)
CHLORIDE SERPL-SCNC: 98 MMOL/L (ref 98–107)
CREAT SERPL-MCNC: 0.79 MG/DL (ref 0.51–0.95)
EGFRCR SERPLBLD CKD-EPI 2021: 78 ML/MIN/1.73M2
EOSINOPHIL # BLD AUTO: 0 10E3/UL (ref 0–0.7)
EOSINOPHIL NFR BLD AUTO: 0 %
ERYTHROCYTE [DISTWIDTH] IN BLOOD BY AUTOMATED COUNT: 16 % (ref 10–15)
GLUCOSE SERPL-MCNC: 150 MG/DL (ref 70–99)
HCO3 SERPL-SCNC: 19 MMOL/L (ref 22–29)
HCT VFR BLD AUTO: 33.4 % (ref 35–47)
HGB BLD-MCNC: 11.2 G/DL (ref 11.7–15.7)
IMM GRANULOCYTES # BLD: 0.1 10E3/UL
IMM GRANULOCYTES NFR BLD: 1 %
INR PPP: 1.74 (ref 0.85–1.15)
LACTATE SERPL-SCNC: 1.5 MMOL/L (ref 0.7–2)
LACTATE SERPL-SCNC: 1.6 MMOL/L (ref 0.7–2)
LYMPHOCYTES # BLD AUTO: 1.8 10E3/UL (ref 0.8–5.3)
LYMPHOCYTES NFR BLD AUTO: 15 %
MCH RBC QN AUTO: 27.6 PG (ref 26.5–33)
MCHC RBC AUTO-ENTMCNC: 33.5 G/DL (ref 31.5–36.5)
MCV RBC AUTO: 82 FL (ref 78–100)
MONOCYTES # BLD AUTO: 0.9 10E3/UL (ref 0–1.3)
MONOCYTES NFR BLD AUTO: 7 %
NEUTROPHILS # BLD AUTO: 9.6 10E3/UL (ref 1.6–8.3)
NEUTROPHILS NFR BLD AUTO: 77 %
NRBC # BLD AUTO: 0 10E3/UL
NRBC BLD AUTO-RTO: 0 /100
PLATELET # BLD AUTO: 409 10E3/UL (ref 150–450)
POTASSIUM SERPL-SCNC: 3.7 MMOL/L (ref 3.4–5.3)
PROT SERPL-MCNC: 7.2 G/DL (ref 6.4–8.3)
RBC # BLD AUTO: 4.06 10E6/UL (ref 3.8–5.2)
SODIUM SERPL-SCNC: 130 MMOL/L (ref 135–145)
TROPONIN T SERPL HS-MCNC: 162 NG/L
TROPONIN T SERPL HS-MCNC: 173 NG/L
WBC # BLD AUTO: 12.4 10E3/UL (ref 4–11)

## 2024-12-02 PROCEDURE — G0378 HOSPITAL OBSERVATION PER HR: HCPCS

## 2024-12-02 PROCEDURE — 85004 AUTOMATED DIFF WBC COUNT: CPT | Performed by: INTERNAL MEDICINE

## 2024-12-02 PROCEDURE — 258N000001 HC RX 258: Performed by: INTERNAL MEDICINE

## 2024-12-02 PROCEDURE — 120N000001 HC R&B MED SURG/OB

## 2024-12-02 PROCEDURE — 36415 COLL VENOUS BLD VENIPUNCTURE: CPT | Performed by: INTERNAL MEDICINE

## 2024-12-02 PROCEDURE — 250N000013 HC RX MED GY IP 250 OP 250 PS 637: Performed by: INTERNAL MEDICINE

## 2024-12-02 PROCEDURE — 99222 1ST HOSP IP/OBS MODERATE 55: CPT | Performed by: INTERNAL MEDICINE

## 2024-12-02 PROCEDURE — 250N000011 HC RX IP 250 OP 636: Performed by: INTERNAL MEDICINE

## 2024-12-02 PROCEDURE — 258N000003 HC RX IP 258 OP 636: Performed by: INTERNAL MEDICINE

## 2024-12-02 PROCEDURE — 93005 ELECTROCARDIOGRAM TRACING: CPT

## 2024-12-02 PROCEDURE — 85610 PROTHROMBIN TIME: CPT | Performed by: INTERNAL MEDICINE

## 2024-12-02 PROCEDURE — 99207 PR NO BILLABLE SERVICE THIS VISIT: CPT | Performed by: INTERNAL MEDICINE

## 2024-12-02 PROCEDURE — 999N000040 HC STATISTIC CONSULT NO CHARGE VASC ACCESS

## 2024-12-02 PROCEDURE — 80053 COMPREHEN METABOLIC PANEL: CPT | Performed by: INTERNAL MEDICINE

## 2024-12-02 PROCEDURE — 36415 COLL VENOUS BLD VENIPUNCTURE: CPT

## 2024-12-02 PROCEDURE — 84484 ASSAY OF TROPONIN QUANT: CPT | Performed by: EMERGENCY MEDICINE

## 2024-12-02 PROCEDURE — 36415 COLL VENOUS BLD VENIPUNCTURE: CPT | Performed by: EMERGENCY MEDICINE

## 2024-12-02 PROCEDURE — 83605 ASSAY OF LACTIC ACID: CPT

## 2024-12-02 PROCEDURE — 999N000128 HC STATISTIC PERIPHERAL IV START W/O US GUIDANCE

## 2024-12-02 RX ORDER — PROCHLORPERAZINE 25 MG
12.5 SUPPOSITORY, RECTAL RECTAL EVERY 12 HOURS PRN
Status: DISCONTINUED | OUTPATIENT
Start: 2024-12-02 | End: 2024-12-02

## 2024-12-02 RX ORDER — SODIUM CHLORIDE, SODIUM LACTATE, POTASSIUM CHLORIDE, CALCIUM CHLORIDE 600; 310; 30; 20 MG/100ML; MG/100ML; MG/100ML; MG/100ML
INJECTION, SOLUTION INTRAVENOUS CONTINUOUS
Status: DISCONTINUED | OUTPATIENT
Start: 2024-12-02 | End: 2024-12-04

## 2024-12-02 RX ORDER — RIZATRIPTAN BENZOATE 10 MG/1
10 TABLET, ORALLY DISINTEGRATING ORAL
Status: DISCONTINUED | OUTPATIENT
Start: 2024-12-02 | End: 2024-12-04 | Stop reason: HOSPADM

## 2024-12-02 RX ORDER — SIMETHICONE 80 MG
80 TABLET,CHEWABLE ORAL EVERY 6 HOURS PRN
Status: DISCONTINUED | OUTPATIENT
Start: 2024-12-02 | End: 2024-12-04 | Stop reason: HOSPADM

## 2024-12-02 RX ORDER — LISINOPRIL 40 MG/1
40 TABLET ORAL DAILY
Status: DISCONTINUED | OUTPATIENT
Start: 2024-12-02 | End: 2024-12-04 | Stop reason: HOSPADM

## 2024-12-02 RX ORDER — CITALOPRAM HYDROBROMIDE 20 MG/1
40 TABLET ORAL DAILY
Status: DISCONTINUED | OUTPATIENT
Start: 2024-12-02 | End: 2024-12-04 | Stop reason: HOSPADM

## 2024-12-02 RX ORDER — METOCLOPRAMIDE HYDROCHLORIDE 5 MG/ML
5 INJECTION INTRAMUSCULAR; INTRAVENOUS 3 TIMES DAILY
Status: DISCONTINUED | OUTPATIENT
Start: 2024-12-02 | End: 2024-12-04 | Stop reason: HOSPADM

## 2024-12-02 RX ORDER — METOPROLOL TARTRATE 25 MG/1
25 TABLET, FILM COATED ORAL 2 TIMES DAILY
Status: DISCONTINUED | OUTPATIENT
Start: 2024-12-02 | End: 2024-12-04 | Stop reason: HOSPADM

## 2024-12-02 RX ORDER — POLYETHYLENE GLYCOL 3350 17 G/17G
17 POWDER, FOR SOLUTION ORAL 2 TIMES DAILY PRN
Status: DISCONTINUED | OUTPATIENT
Start: 2024-12-02 | End: 2024-12-04 | Stop reason: HOSPADM

## 2024-12-02 RX ORDER — PROCHLORPERAZINE MALEATE 5 MG/1
5 TABLET ORAL EVERY 6 HOURS PRN
Status: DISCONTINUED | OUTPATIENT
Start: 2024-12-02 | End: 2024-12-02

## 2024-12-02 RX ORDER — ALBUTEROL SULFATE 90 UG/1
1-2 INHALANT RESPIRATORY (INHALATION) EVERY 4 HOURS PRN
Status: DISCONTINUED | OUTPATIENT
Start: 2024-12-02 | End: 2024-12-04 | Stop reason: HOSPADM

## 2024-12-02 RX ORDER — WARFARIN SODIUM 5 MG/1
10 TABLET ORAL
Status: COMPLETED | OUTPATIENT
Start: 2024-12-02 | End: 2024-12-02

## 2024-12-02 RX ORDER — GABAPENTIN 100 MG/1
200 CAPSULE ORAL 3 TIMES DAILY
Status: DISCONTINUED | OUTPATIENT
Start: 2024-12-02 | End: 2024-12-04 | Stop reason: HOSPADM

## 2024-12-02 RX ORDER — AMOXICILLIN 250 MG
2 CAPSULE ORAL 2 TIMES DAILY PRN
Status: DISCONTINUED | OUTPATIENT
Start: 2024-12-02 | End: 2024-12-04 | Stop reason: HOSPADM

## 2024-12-02 RX ORDER — ONDANSETRON 2 MG/ML
4 INJECTION INTRAMUSCULAR; INTRAVENOUS EVERY 6 HOURS PRN
Status: DISCONTINUED | OUTPATIENT
Start: 2024-12-02 | End: 2024-12-02

## 2024-12-02 RX ORDER — DEXTROSE MONOHYDRATE, SODIUM CHLORIDE, SODIUM LACTATE, POTASSIUM CHLORIDE, CALCIUM CHLORIDE 5; 600; 310; 179; 20 G/100ML; MG/100ML; MG/100ML; MG/100ML; MG/100ML
INJECTION, SOLUTION INTRAVENOUS CONTINUOUS
Status: DISCONTINUED | OUTPATIENT
Start: 2024-12-02 | End: 2024-12-02

## 2024-12-02 RX ORDER — METOCLOPRAMIDE HYDROCHLORIDE 5 MG/ML
5 INJECTION INTRAMUSCULAR; INTRAVENOUS EVERY 6 HOURS PRN
Status: DISCONTINUED | OUTPATIENT
Start: 2024-12-02 | End: 2024-12-02

## 2024-12-02 RX ORDER — AMLODIPINE BESYLATE 5 MG/1
5 TABLET ORAL DAILY
Status: DISCONTINUED | OUTPATIENT
Start: 2024-12-02 | End: 2024-12-04 | Stop reason: HOSPADM

## 2024-12-02 RX ORDER — CALCIUM CARBONATE 500 MG/1
1000 TABLET, CHEWABLE ORAL 4 TIMES DAILY PRN
Status: DISCONTINUED | OUTPATIENT
Start: 2024-12-02 | End: 2024-12-04 | Stop reason: HOSPADM

## 2024-12-02 RX ORDER — ACETAMINOPHEN 325 MG/1
650 TABLET ORAL EVERY 4 HOURS PRN
Status: DISCONTINUED | OUTPATIENT
Start: 2024-12-02 | End: 2024-12-04 | Stop reason: HOSPADM

## 2024-12-02 RX ORDER — ONDANSETRON 4 MG/1
4 TABLET, ORALLY DISINTEGRATING ORAL EVERY 6 HOURS PRN
Status: DISCONTINUED | OUTPATIENT
Start: 2024-12-02 | End: 2024-12-02

## 2024-12-02 RX ORDER — AMOXICILLIN 250 MG
1 CAPSULE ORAL 2 TIMES DAILY PRN
Status: DISCONTINUED | OUTPATIENT
Start: 2024-12-02 | End: 2024-12-04 | Stop reason: HOSPADM

## 2024-12-02 RX ORDER — SUCRALFATE 1 G/1
1 TABLET ORAL 4 TIMES DAILY
Status: DISCONTINUED | OUTPATIENT
Start: 2024-12-02 | End: 2024-12-04 | Stop reason: HOSPADM

## 2024-12-02 RX ORDER — ACETAMINOPHEN 650 MG/1
650 SUPPOSITORY RECTAL EVERY 4 HOURS PRN
Status: DISCONTINUED | OUTPATIENT
Start: 2024-12-02 | End: 2024-12-04 | Stop reason: HOSPADM

## 2024-12-02 RX ORDER — LIDOCAINE 40 MG/G
CREAM TOPICAL
Status: DISCONTINUED | OUTPATIENT
Start: 2024-12-02 | End: 2024-12-04 | Stop reason: HOSPADM

## 2024-12-02 RX ADMIN — SODIUM CHLORIDE, POTASSIUM CHLORIDE, SODIUM LACTATE AND CALCIUM CHLORIDE: 600; 310; 30; 20 INJECTION, SOLUTION INTRAVENOUS at 14:39

## 2024-12-02 RX ADMIN — GABAPENTIN 200 MG: 100 CAPSULE ORAL at 21:08

## 2024-12-02 RX ADMIN — AMLODIPINE BESYLATE 5 MG: 5 TABLET ORAL at 14:41

## 2024-12-02 RX ADMIN — CITALOPRAM HYDROBROMIDE 40 MG: 20 TABLET ORAL at 14:41

## 2024-12-02 RX ADMIN — PROCHLORPERAZINE 12.5 MG: 25 SUPPOSITORY RECTAL at 04:48

## 2024-12-02 RX ADMIN — METOCLOPRAMIDE 5 MG: 5 INJECTION, SOLUTION INTRAMUSCULAR; INTRAVENOUS at 15:50

## 2024-12-02 RX ADMIN — METOPROLOL TARTRATE 25 MG: 25 TABLET, FILM COATED ORAL at 11:55

## 2024-12-02 RX ADMIN — DEXTROSE MONOHYDRATE, SODIUM CHLORIDE, SODIUM LACTATE, POTASSIUM CHLORIDE, CALCIUM CHLORIDE: 5; 600; 310; 179; 20 INJECTION, SOLUTION INTRAVENOUS at 08:25

## 2024-12-02 RX ADMIN — METOCLOPRAMIDE 5 MG: 5 INJECTION, SOLUTION INTRAMUSCULAR; INTRAVENOUS at 21:09

## 2024-12-02 RX ADMIN — GABAPENTIN 200 MG: 100 CAPSULE ORAL at 15:50

## 2024-12-02 RX ADMIN — WARFARIN SODIUM 10 MG: 5 TABLET ORAL at 17:30

## 2024-12-02 RX ADMIN — METOPROLOL TARTRATE 25 MG: 25 TABLET, FILM COATED ORAL at 21:09

## 2024-12-02 RX ADMIN — ONDANSETRON 4 MG: 2 INJECTION, SOLUTION INTRAMUSCULAR; INTRAVENOUS at 08:31

## 2024-12-02 RX ADMIN — SUCRALFATE 1 G: 1 TABLET ORAL at 21:09

## 2024-12-02 RX ADMIN — LISINOPRIL 40 MG: 40 TABLET ORAL at 14:41

## 2024-12-02 RX ADMIN — SIMETHICONE 80 MG: 80 TABLET, CHEWABLE ORAL at 14:46

## 2024-12-02 RX ADMIN — SUCRALFATE 1 G: 1 TABLET ORAL at 17:30

## 2024-12-02 RX ADMIN — DEXTROSE MONOHYDRATE, SODIUM CHLORIDE, SODIUM LACTATE, POTASSIUM CHLORIDE, CALCIUM CHLORIDE: 5; 600; 310; 179; 20 INJECTION, SOLUTION INTRAVENOUS at 01:51

## 2024-12-02 RX ADMIN — ONDANSETRON 4 MG: 2 INJECTION, SOLUTION INTRAMUSCULAR; INTRAVENOUS at 02:07

## 2024-12-02 ASSESSMENT — ACTIVITIES OF DAILY LIVING (ADL)
ADLS_ACUITY_SCORE: 57
ADLS_ACUITY_SCORE: 65
ADLS_ACUITY_SCORE: 65
ADLS_ACUITY_SCORE: 57
ADLS_ACUITY_SCORE: 59
ADLS_ACUITY_SCORE: 65
ADLS_ACUITY_SCORE: 65
ADLS_ACUITY_SCORE: 59
ADLS_ACUITY_SCORE: 61
ADLS_ACUITY_SCORE: 61
ADLS_ACUITY_SCORE: 65
ADLS_ACUITY_SCORE: 61
ADLS_ACUITY_SCORE: 59
ADLS_ACUITY_SCORE: 59
ADLS_ACUITY_SCORE: 65
ADLS_ACUITY_SCORE: 59
ADLS_ACUITY_SCORE: 66
ADLS_ACUITY_SCORE: 65
ADLS_ACUITY_SCORE: 61
ADLS_ACUITY_SCORE: 59

## 2024-12-02 NOTE — DISCHARGE INSTRUCTIONS
Some additional resources:      Web: https://helpSolar[employer]Dor.org/   Phone: 247.714.9289   [employer] is a nonprofit serving seniors and individuals with disabilities across [place] [facility] [place] area. Our grocery assistance, home support and transportation services provide help with in-home tasks and chores.    _________________________________________________________________________________________    Meals on Wheels    [facility] call Phone: [phone]             Who is eligible to receive Meals on Wheels?  To anyone - both on a long-term basis and temporarily if you are recovering from surgery or illness.  How much do meals cost? Is financial assistance available? We ask for a modest contribution toward your meals, but the price is based on need. Meals may be authorized as part of [place]'s home- and community-based services Medicaid waiver program, and other subsidy programs can also help cover the cost of meal delivery service. If you have questions about funding options, call us.    Are diabetic and other diet-specific meal options available?  Meals on Wheels happily offers low-sugar, low-sodium meal options, as well as vegetarian options. Simply specify your dietary needs when you sign up for meals.  Are culturally specific meals available?  Kosher, Peruvian/Halal meals and other culturally specific meal options are available in many areas. You can specify cultural meal preferences when you sign up for meals online or when you call us.    _________________________________________________________________________________________  [employer]   Web: https://seniorcommunity.org/services/  Phone: (652) 405-5359          Other resources:   call to reach someone who can connect you with more resources!   _________________________________________________________________________________________               "  _________________________________________________________________________________________    _________________________________________________________________________________________    If your family would like extra support, here is one great resource:   \"[employer]\" call 616-082-[facility](3331)  Customer service hours: Monday - Saturday, 9 a.m. - 7 p.m.                "

## 2024-12-02 NOTE — PLAN OF CARE
Goal Outcome Evaluation:      Plan of Care Reviewed With: patient, child          Outcome Evaluation: Likely home w-C

## 2024-12-02 NOTE — CONSULTS
[facility]    Cardiology Consultation     Date of Admission:  12/1/2024    Assessment & Plan   Grace Jorgensen is a 74 year old female who was admitted on 12/1/2024.    Elevated troponin, without any chest pain or shortness of breath, EKG does shows nonspecific ST-T changes -likely type II MI due to delta change in troponin and EKG findings.  This may be related to dehydration intravascular volume depletion -will get echocardiogram to assess for any wall motion abnormalities or LV dysfunction  Asymptomatic severe coronary calcification on CT chest, continue medical therapy and risk factor modification  Approximator fibrillation with previous CVA, on warfarin  Gastroparesis with previous neurostimulator insertion  Appears to have cognitive dysfunction, may benefit from neurocognitive test    Discussion  At this time, patient does not have any active cardiac symptoms.  The troponin elevation on admission could be secondary to intravascular volume depletion and dehydration which led to tachycardia and may have had some hypotension before being admitted.  I would recommend echocardiac to make sure there is no LV dysfunction or wall motion abnormalities.  In absence of that, continue medical therapy and risk factor modification.  Will add metoprolol for better blood pressure control as well as underlying CAD.  Already on atorvastatin.  Not on aspirin as patient is on warfarin.    At her age and with apparent cognitive dysfunction and absence of any symptoms, does not appear to need aggressive cardiac evaluation at this time.  She can follow-up with her primary care physician after discharge  Consider neuro cognitive evaluation while in the hospital.            Sandeep Russell MD  Primary Care Physician   Arnulfo Duffy    Reason for Consult   Reason for consult: I was asked by hospitalist to evaluate this patient for elevated trop.    History of Present Illness   Grace Jorgensen is a 74  year old female who has a history of type 2 diabetes mellitus, paroxysmal A-fib on warfarin, HFpEF, cerebellar ataxia, gastroparesis s/p gastric neurostimulator placement.  She has cognitive dysfunction and was brought by her son because of nausea and vomiting for couple days prior to admission.  She had poor oral intake.  She does not remember why she is here.  She was not able to tell me which hospital she is, what year this is or who the president is.      She appeared comfortable and denied any symptoms at this time.      During her evaluation, she was noted to have mild increase in troponin up to 39 that has been decreasing since she was brought to the ER by her son due to nausea, vomitings open 1 day onset as well as poor p.o. intake.  Per son, her symptoms started evening of Saturday.  Patient's son is also noted that his mother has also been more confused for the past day. She recently had the battery of her stimulator changed.  No documented fever.  Son did report folks around them have had similar symptoms in the past week.  It decreased from 2 39-1 75 and then 162.  EKG revealed sinus rhythm with nonspecific ST-T changes.  Patient never had any chest pain or shortness of breath.    On admission, sodium was 128, chloride 89, bicarb 18.  Creatinine normal.  Lactic acid was 2.6.  CT chest on admission revealed emphysema.  Hiatal hernia.  Severe coronary artery calcification.    Her previous echocardiogram in 2017 revealed normal ejection fraction.  This was in [facility].    Past Medical History   Past Medical History:   Diagnosis Date    Cerebral infarction (H)     Depressive disorder     Diabetes (H)     History of blood transfusion     Hypertension          Past Surgical History   Past Surgical History:   Procedure Laterality Date    ABDOMEN SURGERY      ABDOMEN SURGERY      pump in stomach for nausea    BREAST SURGERY      BREAST SURGERY      removed for pain    COLONOSCOPY      COLONOSCOPY N/A  1/24/2024    Procedure: Colonoscopy;  Surgeon: Tyler Dotson MD;  Location:  GI    ESOPHAGOSCOPY, GASTROSCOPY, DUODENOSCOPY (EGD), COMBINED N/A 1/23/2024    Procedure: Esophagoscopy, gastroscopy, duodenoscopy (EGD), combined;  Surgeon: Tyler Dotson MD;  Location:  GI    ORTHOPEDIC SURGERY      ORTHOPEDIC SURGERY Left     broken  shoulder with janie in humerus         Prior to Admission Medications   Prior to Admission Medications   Prescriptions Last Dose Informant Patient Reported? Taking?   Aspirin Effervescent (AVA-SELTZER ORIGINAL PO) Unknown Son Yes Yes   Sig: Take 1-2 tablets by mouth every 4 hours as needed.   albuterol (PROAIR HFA/PROVENTIL HFA/VENTOLIN HFA) 108 (90 Base) MCG/ACT inhaler Unknown Son Yes Yes   Sig: Inhale 1-2 puffs into the lungs every 4 hours as needed for shortness of breath, wheezing or cough.   amLODIPine (NORVASC) 5 MG tablet 11/30/2024 Son Yes Yes   Sig: Take 5 mg by mouth daily.   atorvastatin (LIPITOR) 20 MG tablet 11/30/2024 Son Yes Yes   Sig: Take 20 mg by mouth at bedtime   citalopram (CELEXA) 40 MG tablet 11/30/2024 Son Yes Yes   Sig: Take 40 mg by mouth daily   fluticasone (FLONASE) 50 MCG/ACT nasal spray Unknown Son Yes Yes   Sig: Spray 1 spray into both nostrils daily as needed for rhinitis or allergies.   furosemide (LASIX) 20 MG tablet Unknown Son Yes Yes   Sig: Take 20 mg by mouth daily.   gabapentin (NEURONTIN) 100 MG capsule 11/30/2024 Son Yes Yes   Sig: Take 200 mg by mouth 3 times daily.   ketoconazole (NIZORAL) 2 % external shampoo Unknown Son Yes Yes   Sig: Every two weeks prn   lisinopril (ZESTRIL) 40 MG tablet 11/30/2024 Son Yes Yes   Sig: Take 40 mg by mouth daily.   melatonin 1 MG TABS tablet 11/30/2024 Bedtime Son Yes Yes   Sig: Take 1 mg by mouth at bedtime.   metFORMIN (GLUCOPHAGE) 500 MG tablet 11/30/2024 Morning Son Yes Yes   Sig: Take 500 mg by mouth daily (with breakfast)   multivitamin w/minerals (THERA-VIT-M) tablet Past Week Son No  Yes   Sig: Take 1 tablet by mouth daily   pantoprazole (PROTONIX) 20 MG EC tablet 11/30/2024 Son Yes Yes   Sig: Take 20 mg by mouth 2 times daily.   polyethylene glycol (MIRALAX) 17 g packet Unknown Son No Yes   Sig: Take 17 g by mouth 2 times daily as needed for constipation   rizatriptan (MAXALT-MLT) 10 MG ODT Unknown Son Yes Yes   Sig: Take 10 mg by mouth at onset of headache for migraine.   simethicone (MYLICON) 80 MG chewable tablet Unknown Son Yes Yes   Sig: Take 80 mg by mouth every 6 hours as needed for flatulence or cramping.   sucralfate (CARAFATE) 1 GM tablet Unknown Son Yes Yes   Sig: Take 1 g by mouth 4 times daily.   warfarin ANTICOAGULANT (COUMADIN) 5 MG tablet 11/30/2024 Evening Son Yes Yes   Sig: Take by mouth daily. Take 15 mg Wed, Sat  Take 10 mg all other days      Facility-Administered Medications: None     Current Facility-Administered Medications   Medication Dose Route Frequency Provider Last Rate Last Admin    acetaminophen (TYLENOL) tablet 650 mg  650 mg Oral Q4H PRN Donnie Bae MD        Or    acetaminophen (TYLENOL) Suppository 650 mg  650 mg Rectal Q4H PRN Donnie Bae MD        calcium carbonate (TUMS) chewable tablet 1,000 mg  1,000 mg Oral 4x Daily PRN Donnie Bae MD        dextrose 5% in lactated ringers + KCl 20 mEq/L infusion   Intravenous Continuous Donnie Bae  mL/hr at 12/02/24 0825 New Bag at 12/02/24 0825    lidocaine (LMX4) cream   Topical Q1H PRN Donnie Bae MD        lidocaine 1 % 0.1-1 mL  0.1-1 mL Other Q1H PRN Donnie Bae MD        ondansetron (ZOFRAN ODT) ODT tab 4 mg  4 mg Oral Q6H PRN Donnie Bae MD        Or    ondansetron (ZOFRAN) injection 4 mg  4 mg Intravenous Q6H PRN Donnie Bae MD   4 mg at 12/02/24 0831    Patient is already receiving anticoagulation with heparin, enoxaparin (LOVENOX), warfarin (COUMADIN)  or other anticoagulant medication   Does not apply Continuous PRN Donnie Bae MD        prochlorperazine  (COMPAZINE) injection 5 mg  5 mg Intravenous Q6H PRN Hosea Ly MD        Or    prochlorperazine (COMPAZINE) tablet 5 mg  5 mg Oral Q6H PRN Hosea Ly MD        Or    prochlorperazine (COMPAZINE) suppository 12.5 mg  12.5 mg Rectal Q12H PRN Hosea Ly MD   12.5 mg at 12/02/24 0448    senna-docusate (SENOKOT-S/PERICOLACE) 8.6-50 MG per tablet 1 tablet  1 tablet Oral BID PRN Donnie Bae MD        Or    senna-docusate (SENOKOT-S/PERICOLACE) 8.6-50 MG per tablet 2 tablet  2 tablet Oral BID PRN Donnie Bae MD        sodium chloride (PF) 0.9% PF flush 3 mL  3 mL Intracatheter q1 min prn Aniceto Maier DO        sodium chloride (PF) 0.9% PF flush 3 mL  3 mL Intracatheter Q8H Aniceto Maier DO        Warfarin Dose Required Daily - Pharmacist Managed  1 each Oral See Admin Instructions Donnie Bae MD         Current Facility-Administered Medications   Medication Dose Route Frequency Provider Last Rate Last Admin    acetaminophen (TYLENOL) tablet 650 mg  650 mg Oral Q4H PRN Donnie Bae MD        Or    acetaminophen (TYLENOL) Suppository 650 mg  650 mg Rectal Q4H PRN Donnie Bae MD        calcium carbonate (TUMS) chewable tablet 1,000 mg  1,000 mg Oral 4x Daily PRN Donnie Bae MD        dextrose 5% in lactated ringers + KCl 20 mEq/L infusion   Intravenous Continuous Donnie Bae  mL/hr at 12/02/24 0825 New Bag at 12/02/24 0825    lidocaine (LMX4) cream   Topical Q1H PRN Donnie Bae MD        lidocaine 1 % 0.1-1 mL  0.1-1 mL Other Q1H PRN Donnie Bae MD        ondansetron (ZOFRAN ODT) ODT tab 4 mg  4 mg Oral Q6H PRN Donnie Bae MD        Or    ondansetron (ZOFRAN) injection 4 mg  4 mg Intravenous Q6H PRN Donnie Bae MD   4 mg at 12/02/24 0831    Patient is already receiving anticoagulation with heparin, enoxaparin (LOVENOX), warfarin (COUMADIN)  or other anticoagulant medication   Does not apply Continuous PRN  Donnie Bae MD        prochlorperazine (COMPAZINE) injection 5 mg  5 mg Intravenous Q6H PRN Hosea Ly MD        Or    prochlorperazine (COMPAZINE) tablet 5 mg  5 mg Oral Q6H PRN Hosea Ly MD        Or    prochlorperazine (COMPAZINE) suppository 12.5 mg  12.5 mg Rectal Q12H PRN Hosea Ly MD   12.5 mg at 12/02/24 0448    senna-docusate (SENOKOT-S/PERICOLACE) 8.6-50 MG per tablet 1 tablet  1 tablet Oral BID PRN Donnie Bae MD        Or    senna-docusate (SENOKOT-S/PERICOLACE) 8.6-50 MG per tablet 2 tablet  2 tablet Oral BID PRN Donnie Bae MD        sodium chloride (PF) 0.9% PF flush 3 mL  3 mL Intracatheter q1 min prn Aniceto Maier DO        sodium chloride (PF) 0.9% PF flush 3 mL  3 mL Intracatheter Q8H Aniceto Maier DO        Warfarin Dose Required Daily - Pharmacist Managed  1 each Oral See Admin Instructions Donnie Bae MD         Allergies   Allergies   Allergen Reactions    Bupropion GI Disturbance       Social History    reports that she has quit smoking. Her smoking use included cigarettes. She has never used smokeless tobacco. She reports that she does not currently use alcohol. She reports that she does not use drugs.      Family History            Review of Systems   A comprehensive review of system was performed and is negative other than that noted in the HPI or here.     Physical Exam   Vital Signs with Ranges  Temp:  [98.6  F (37  C)-99.1  F (37.3  C)] 98.6  F (37  C)  Pulse:  [103-113] 108  Resp:  [18-20] 18  BP: (111-179)/() 172/92  SpO2:  [91 %-94 %] 94 %  Wt Readings from Last 4 Encounters:   01/22/24 81.6 kg (180 lb)   10/21/23 78.3 kg (172 lb 9.6 oz)   11/09/22 79.4 kg (175 lb)   11/06/21 86.2 kg (190 lb)     I/O last 3 completed shifts:  In: -   Out: 600 [Urine:600]      Vitals: BP (!) 172/92 (BP Location: Left arm)   Pulse 108   Temp 98.6  F (37  C) (Oral)   Resp 18   SpO2 94%     Physical Exam:  "  General - Alert   Eyes - No scleral icterus  HEENT - Neck supple, moist mucous membranes  Cardiovascular -regular S1-S2 without murmur  Extremities - There is no edema  Respiratory -clear to auscultation  Skin - No pallor or cyanosis  Gastrointestinal - Non tender and non distended without rebound or guarding  Psych - Appropriate affect, not oriented to time place or person  Neurological -can move all 4 limbs    No lab results found in last 7 days.    Invalid input(s): \"TROPONINIES\"    Recent Labs   Lab 12/02/24  0534 12/01/24 1956 12/01/24 1941   WBC 12.4* 12.3*  --    HGB 11.2* 12.6  --    MCV 82 82  --     478*  --    INR  --  2.14*  --    * 128*  --    POTASSIUM 3.7 4.3  --    CHLORIDE 98 89*  --    CO2 19* 18*  --    BUN 17.1 16.2  --    CR 0.79 0.90  --    GFRESTIMATED 78 66  --    ANIONGAP 13 21*  --    SERA 8.7* 9.5  --    * 171* 174*   ALBUMIN 4.1 4.6  --    PROTTOTAL 7.2 8.4*  --    BILITOTAL 0.4 0.4  --    ALKPHOS 79 95  --    ALT 20 22  --    AST 38 34  --    LIPASE  --  20  --      No results for input(s): \"CHOL\", \"HDL\", \"LDL\", \"TRIG\", \"CHOLHDLRATIO\" in the last 93807 hours.  Recent Labs   Lab 12/02/24  0534 12/01/24 1956   WBC 12.4* 12.3*   HGB 11.2* 12.6   HCT 33.4* 36.6   MCV 82 82    478*     Recent Labs   Lab 12/01/24 2148 12/01/24 1959   PHV 7.40 7.42   PO2V 44 24*   PCO2V 28* 33*   HCO3V 17* 21     No results for input(s): \"NTBNPI\", \"NTBNP\" in the last 168 hours.  No results for input(s): \"DD\" in the last 168 hours.  No results for input(s): \"SED\", \"CRP\" in the last 168 hours.  Recent Labs   Lab 12/02/24  0534 12/01/24  1956    478*     No results for input(s): \"TSH\" in the last 168 hours.  Recent Labs   Lab 12/01/24 2151   COLOR Light Yellow   APPEARANCE Clear   URINEGLC 150*   URINEBILI Negative   URINEKETONE Negative   SG 1.015   UBLD Large*   URINEPH 7.5*   PROTEIN 600*   NITRITE Negative   LEUKEST Negative   RBCU 6*   WBCU 5       Imaging:  Recent " Results (from the past 48 hours)   CT Chest/Abdomen/Pelvis w Contrast    Narrative    EXAM: CT CHEST/ABDOMEN/PELVIS W CONTRAST  LOCATION: [facility]  DATE: 12/1/2024    INDICATION: Chest pain, abdominal pain, nausea, elevated lactate.  COMPARISON: Chest CT from 9/22/2023.  TECHNIQUE: CT scan of the chest, abdomen, and pelvis was performed following injection of IV contrast. Multiplanar reformats were obtained. Dose reduction techniques were used.   CONTRAST: 91 mL Isovue 370.    FINDINGS:   LUNGS AND PLEURA: Emphysematous changes in the lungs. No acute infiltrates or consolidation. Mild dependent atelectasis. Benign calcified granuloma right middle lobe. No pleural effusions.    MEDIASTINUM/AXILLAE: No adenopathy. Normal heart size. No pericardial effusion. Normal caliber thoracic aorta. Esophagus is partially fluid-filled. Small to moderate hiatal hernia. Bilateral breast implants.    CORONARY ARTERY CALCIFICATION: Severe.    HEPATOBILIARY: Mild diffuse fatty infiltration of the liver. No calcified gallstones or biliary dilatation.    PANCREAS: Normal.    SPLEEN: Normal.    ADRENAL GLANDS: Normal.    KIDNEYS/BLADDER: Small benign right renal cyst with no follow-up needed. Kidneys otherwise negative. No hydronephrosis. Bladder is unremarkable.    BOWEL: Bowel is normal in caliber with no evidence for obstruction. Negative for appendicitis. Sigmoid diverticulosis without evidence for diverticulitis. Electronic stimulator device noted in the subcutaneous tissues left lower quadrant with lead tips   extending to the distal stomach.    LYMPH NODES: Normal.    VASCULATURE: Diffuse aortoiliac atheromatous disease. Negative for aneurysm.    PELVIC ORGANS: Normal.    MUSCULOSKELETAL: Left shoulder arthroplasty. Mild/moderate degenerative changes in the spine greatest in the lumbar spine.      Impression    IMPRESSION:  1.  Emphysematous changes in the lungs. No evidence for acute pneumonitis.    2.   Small to moderate hiatal hernia with partially fluid-filled esophagus of uncertain significance. Nothing definite for bowel obstruction and no acute bowel findings elsewhere. Upper GI exam could be performed in further evaluation as clinically   warranted.    3.  Unremarkable electronic stimulator device subcutaneous tissues left lower quadrant with lead tips extending to the distal portion of the stomach.    4.  Mild diffuse fatty infiltration of the liver.    5.  Sigmoid diverticulosis without evidence for diverticulitis.    6.  Diffuse atheromatous disease including severe coronary artery calcification.       Echo:  No results found for this or any previous visit (from the past 4320 hours).    Clinically Significant Risk Factors Present on Admission         # Hyponatremia: Lowest Na = 128 mmol/L in last 2 days, will monitor as appropriate  # Hypochloremia: Lowest Cl = 89 mmol/L in last 2 days, will monitor as appropriate     # Anion Gap Metabolic Acidosis: Highest Anion Gap = 21 mmol/L in last 2 days, will monitor and treat as appropriate   # Drug Induced Coagulation Defect: home medication list includes an anticoagulant medication    # Hypertension: Noted on problem list

## 2024-12-02 NOTE — PHARMACY-ANTICOAGULATION SERVICE
Clinical Pharmacy - Warfarin Dosing Consult     Pharmacy has been consulted to manage this patient s warfarin therapy.  Indication: Atrial Fibrillation  Therapy Goal: INR 2-3  Warfarin Prior to Admission: Yes  Warfarin PTA Regimen: 15 mg on Wed and Sat, 10 mg ROW  Recent documented change in oral intake/nutrition: Unknown    INR   Date Value Ref Range Status   12/02/2024 1.74 (H) 0.85 - 1.15 Final   12/01/2024 2.14 (H) 0.85 - 1.15 Final       Recommend warfarin 10 mg today.  Pharmacy will monitor Grace Jorgensen daily and order warfarin doses to achieve specified goal.      Please contact pharmacy as soon as possible if the warfarin needs to be held for a procedure or if the warfarin goals change.

## 2024-12-02 NOTE — CONSULTS
Care Management Initial Consult    General Information  Assessment completed with: Patient, Children, Anand Edwards  Type of CM/SW Visit: Initial Assessment    Primary Care Provider verified and updated as needed:     Readmission within the last 30 days: no previous admission in last 30 days      Reason for Consult: discharge planning  Advance Care Planning: Advance Care Planning Reviewed: no concerns identified          Communication Assessment  Patient's communication style: spoken language (English or Bilingual)             Cognitive  Cognitive/Neuro/Behavioral: .WDL except, orientation  Level of Consciousness: intermittent confusion  Arousal Level: arouses to voice  Orientation: disoriented to, time, place (Orieneted 2-3)  Mood/Behavior: anxious, cooperative  Best Language: 0 - No aphasia  Speech: clear, spontaneous    Living Environment:   People in home: child(george), adult  Anand  Current living Arrangements: house      Able to return to prior arrangements: yes       Family/Social Support:  Care provided by: child(george), self  Provides care for: no one, unable/limited ability to care for self  Marital Status: Single  Support system: Children          Description of Support System: Involved, Supportive         Current Resources:   Patient receiving home care services: No        Community Resources:    Equipment currently used at home: walker, standard  Supplies currently used at home:      Employment/Financial:  Employment Status: retired        Financial Concerns:     Referral to Financial Worker: No       Does the patient's insurance plan have a 3 day qualifying hospital stay waiver?  No    Lifestyle & Psychosocial Needs:  Social Drivers of Health     Food Insecurity: Not on file   Depression: At risk (11/12/2024)    Received from [facility]    PHQ-2     PHQ-2 Score: 3   Housing Stability: Not on file   Tobacco Use: Medium Risk (11/12/2024)    Received from [facility]    Patient History     Smoking  "Tobacco Use: Former     Smokeless Tobacco Use: Never     Passive Exposure: Never   Financial Resource Strain: Not on file   Alcohol Use: Not on file   Transportation Needs: Not on file   Physical Activity: Not on file   Interpersonal Safety: Not on file   Stress: Not on file   Social Connections: Not on file   Health Literacy: Not on file       Functional Status:  Prior to admission patient needed assistance:   Dependent ADLs:: Independent, Ambulation-walker  Dependent IADLs:: Transportation, Medication Management, Laundry, Shopping  Assesssment of Functional Status: Not at baseline with ADL Functioning    Mental Health Status:  Mental Health Status: No Current Concerns       Chemical Dependency Status:  Chemical Dependency Status: No Current Concerns             Values/Beliefs:  Spiritual, Cultural Beliefs, Jewish Practices, Values that affect care: no               Discussed  Partnership in Safe Discharge Planning  document with patient/family: Yes: Grace and Anand (son)    Additional Information:  Writer met with patient in her room to introduce self and role in discharge planning. Grace was able to confirm her address, her son's name, insurance, but struggled to remember her PCP or how long she had been feeling ill. There remainder of her assessment was completed with her son Anand([phone]). Grace lives in a house in [place] with her son, Anand, who works for the [employer]. She is largely independent in all ADLs, sometimes she will use a walker. She and Anand share the housework, but her son does all the laundry, in the basement. She is alone for long periods of the day/evening, Anand would like resources for more support for her while he works, they have never had home care in the house, but Anand would like to talk to Grace about it, she adamantly refuses \"nursing home\" services, though he feels like she may be needing more help than he can provide alone at home. Writer told " patient she would likelt be seen by therapies, and we would follow along for final disposition.    Next Steps: PT/OT assess, final disposition TBD    Robyn Cheatham, RN Care Coordinator  [facility]  [phone]

## 2024-12-02 NOTE — PROGRESS NOTES
GI consultation received. Patient last seen by Mauri RUSSO in 1/2024. Please consult them to see this patient.     Saskia Chairez CNP  [facility]

## 2024-12-02 NOTE — PROGRESS NOTES
Admitted early morning by my colleague, she is having generalized abdominal discomfort, unable to express her concerns, she had 24 hours of ongoing nausea and vomiting prior to admission, lactate levels improved, continuing on IV fluids here, oral intake is poor, CT abdomen and pelvis did not indicate any acute findings.  Of note patient had her gastric stimulator battery changed and changed the settings 3 weeks ago, son is suspecting whether this would be making her symptoms worse, over last 2 weeks she is having symptoms similar to this but in lesser intensity.  Requested input from cardiology due to elevated troponin, reviewed notes, will wait for the echocardiogram results, discussed with son if symptoms improved in next 24 to 48 hours possibly secondary to viral gastritis, will continue with IV PPI for now, if no improvement she might need to adjust the stimulator setting to help with her ongoing gastroparesis.  Will start with low-dose Reglan today.  Continue on telemetry.  Spent 25 minutes

## 2024-12-02 NOTE — H&P
[facility]    History and Physical - Hospitalist Service       Date of Admission:  12/1/2024    Assessment & Plan      Grace Jorgensen is a 74 year old female hx of history of type 2 diabetes mellitus, paroxysmal A-fib on warfarin, HFpEF, cerebellar ataxia, gastroparesis s/p gastric neurostimulator placement.  Patient presents to the ER with son due to complaints of nausea and vomiting.   On arrival to the ER, vitals notable for tachycardia.  Initial labs notable for hyponatremia, lactic acid of 5.3, mild leukocytosis.  She received 2 L of normal saline bolus with improvement of lactic acid to 2.6.  She also did have troponin elevation of 239.  Urinalysis obtained was noninfectious.  SARS-CoV-2, influenza and RSV negative.  CT chest abdomen pelvis obtained without any acute finding.      # Nausea, Vomiting   # Dehydration   # Elevated lactic acid - improving   # Hyponatremia   # Troponinemia - likely due to demand ischemia in the setting of dehydration  # Acute toxic metabolic encephalopathy given baseline dementia-likely due to above  -Patient is presenting from home with 1 day onset of nausea, vomiting, poor p.o. intake per discussion with patient's son.  -She has also become more confused during this time  -Per son, they have another close friend's with similar complaints in the past week or so  -On exam, she appears volume down.  -Presented with elevated lactic acid, hyponatremia, elevated troponin all likely due to dehydration.  -Received 3 L of IV fluids with adequate effect in the ER  -Troponin has since trended down, lactic acid has normalized.  -Patient presentation likely due to gastroenteritis  -Obtain enteric panel if able  -Continue maintenance IV fluid for additional day  -Obtain TTE  -Advance diet as tolerated  -Monitor morning CMP and replete electrolytes as able  -Hold off on any antibiotics at this time      Chronic Medical Issues-majority of her medications could not be  verified except for warfarin which has been continued.  # Hypertension -  # ?HFpEF-obtain echocardiogram  # Paroxysmal [id number] to dose warfarin.  INR this admission 2.14  -Could not verify most medications other than patient taking warfarin.    # GERD; Hiatal Hernia-unclear if patient is on Protonix; will order Protonix every morning.  # Diabetes mellitus, type II-A1c today to go 6.7%.  Hold PTA metformin.         Diet: Advance Diet as Tolerated: Clear Liquid Diet  DVT Prophylaxis: Warfarin  Waterman Catheter: Not present  Lines: None     Cardiac Monitoring: None  Code Status: Full Code     Clinically Significant Risk Factors Present on Admission         # Hyponatremia: Lowest Na = 128 mmol/L in last 2 days, will monitor as appropriate  # Hypochloremia: Lowest Cl = 89 mmol/L in last 2 days, will monitor as appropriate     # Anion Gap Metabolic Acidosis: Highest Anion Gap = 21 mmol/L in last 2 days, will monitor and treat as appropriate     # Drug Induced Coagulation Defect: home medication list includes an anticoagulant medication    # Hypertension: Noted on problem list                      Disposition Plan     Medically Ready for Discharge: Anticipated in 2-4 Days           MARIO MARTINEZ MD  Hospitalist Service  [facility]  Securely message with [employer] (more info)  Text page via [employer] Paging/Directory     ______________________________________________________________________    Chief Complaint   Generalized weakness    History is obtained from the patient's son    History of Present Illness   Grace Jorgensen is a 74 year old female who has a history of type 2 diabetes mellitus, paroxysmal A-fib on warfarin, HFpEF, cerebellar ataxia, gastroparesis s/p gastric neurostimulator placement.  She was brought to the ER by her son due to nausea, vomitings open 1 day onset as well as poor p.o. intake.  Per son, her symptoms started evening of Saturday.  Patient's son is also noted that his  mother has also been more confused for the past day. She recently had the battery of her stimulator changed.  No documented fever.  Son did report folks around them have had similar symptoms in the past week.      Past Medical History    Past Medical History:   Diagnosis Date    Cerebral infarction (H)     Depressive disorder     Diabetes (H)     History of blood transfusion     Hypertension        Past Surgical History   Past Surgical History:   Procedure Laterality Date    ABDOMEN SURGERY      ABDOMEN SURGERY      pump in stomach for nausea    BREAST SURGERY      BREAST SURGERY      removed for pain    COLONOSCOPY      COLONOSCOPY N/A 1/24/2024    Procedure: Colonoscopy;  Surgeon: Tyler Dotson MD;  Location:  GI    ESOPHAGOSCOPY, GASTROSCOPY, DUODENOSCOPY (EGD), COMBINED N/A 1/23/2024    Procedure: Esophagoscopy, gastroscopy, duodenoscopy (EGD), combined;  Surgeon: Tyler Dotson MD;  Location:  GI    ORTHOPEDIC SURGERY      ORTHOPEDIC SURGERY Left     broken  shoulder with janie in humerus       Prior to Admission Medications   Prior to Admission Medications   Prescriptions Last Dose Informant Patient Reported? Taking?   Aspirin Effervescent (AVA-SELTZER ORIGINAL PO) Unknown Son Yes Yes   Sig: Take 1-2 tablets by mouth every 4 hours as needed.   albuterol (PROAIR HFA/PROVENTIL HFA/VENTOLIN HFA) 108 (90 Base) MCG/ACT inhaler Unknown Son Yes Yes   Sig: Inhale 1-2 puffs into the lungs every 4 hours as needed for shortness of breath, wheezing or cough.   amLODIPine (NORVASC) 5 MG tablet 11/30/2024 Son Yes Yes   Sig: Take 5 mg by mouth daily.   atorvastatin (LIPITOR) 20 MG tablet 11/30/2024 Son Yes Yes   Sig: Take 20 mg by mouth at bedtime   citalopram (CELEXA) 40 MG tablet 11/30/2024 Son Yes Yes   Sig: Take 40 mg by mouth daily   fluticasone (FLONASE) 50 MCG/ACT nasal spray Unknown Son Yes Yes   Sig: Spray 1 spray into both nostrils daily as needed for rhinitis or allergies.   furosemide (LASIX)  20 MG tablet Unknown Son Yes Yes   Sig: Take 20 mg by mouth daily.   gabapentin (NEURONTIN) 100 MG capsule 11/30/2024 Son Yes Yes   Sig: Take 200 mg by mouth 3 times daily.   ketoconazole (NIZORAL) 2 % external shampoo Unknown Son Yes Yes   Sig: Every two weeks prn   lisinopril (ZESTRIL) 40 MG tablet 11/30/2024 Son Yes Yes   Sig: Take 40 mg by mouth daily.   melatonin 1 MG TABS tablet 11/30/2024 Bedtime Son Yes Yes   Sig: Take 1 mg by mouth at bedtime.   metFORMIN (GLUCOPHAGE) 500 MG tablet 11/30/2024 Morning Son Yes Yes   Sig: Take 500 mg by mouth daily (with breakfast)   multivitamin w/minerals (THERA-VIT-M) tablet Past Week Son No Yes   Sig: Take 1 tablet by mouth daily   pantoprazole (PROTONIX) 20 MG EC tablet 11/30/2024 Son Yes Yes   Sig: Take 20 mg by mouth 2 times daily.   polyethylene glycol (MIRALAX) 17 g packet Unknown Son No Yes   Sig: Take 17 g by mouth 2 times daily as needed for constipation   rizatriptan (MAXALT-MLT) 10 MG ODT Unknown Son Yes Yes   Sig: Take 10 mg by mouth at onset of headache for migraine.   simethicone (MYLICON) 80 MG chewable tablet Unknown Son Yes Yes   Sig: Take 80 mg by mouth every 6 hours as needed for flatulence or cramping.   sucralfate (CARAFATE) 1 GM tablet Unknown Son Yes Yes   Sig: Take 1 g by mouth 4 times daily.   warfarin ANTICOAGULANT (COUMADIN) 5 MG tablet 11/30/2024 Evening Son Yes Yes   Sig: Take by mouth daily. Take 15 mg Wed, Sat  Take 10 mg all other days      Facility-Administered Medications: None           Physical Exam   Vital Signs: Temp: 99.1  F (37.3  C) Temp src: Temporal BP: (!) 127/93 Pulse: 113   Resp: 20 SpO2: 92 % O2 Device: None (Room air)    Weight: 0 lbs 0 oz    General Appearance: Lying in bed currently on room air, tired appearing.  HEENT: PERRL: EOMI; moist mucous membrane w/o lesions  Neck: No JVD  Pulmonary: Clear to auscultation bilaterally, no wheezes or crackles  CVS: Regular rhythm, tachycardic, no murmurs, rubs or gallops  GI: BS (+),  soft nontender, no rebound or guarding   Extremities: No LE edema.   Skin: No rashes or lesions  Neurologic: Alert and oriented to person but not place or situation.  Able to follow simple commands.       Medical Decision Making       65 MINUTES SPENT BY ME on the date of service doing chart review, history, exam, documentation & further activities per the note.      Data   ------------------------- PAST 24 HR DATA REVIEWED -----------------------------------------------    I have personally reviewed the following data over the past 24 hrs:    12.3 (H)  \   12.6   / 478 (H)     128 (L) 89 (L) 16.2 /  171 (H)   4.3 18 (L) 0.90 \     ALT: 22 AST: 34 AP: 95 TBILI: 0.4   ALB: 4.6 TOT PROTEIN: 8.4 (H) LIPASE: 20     Trop: 173 (HH) BNP: N/A     Procal: N/A CRP: N/A Lactic Acid: 1.6       INR:  2.14 (H) PTT:  N/A   D-dimer:  N/A Fibrinogen:  N/A       Imaging results reviewed over the past 24 hrs:   Recent Results (from the past 24 hours)   CT Chest/Abdomen/Pelvis w Contrast    Narrative    EXAM: CT CHEST/ABDOMEN/PELVIS W CONTRAST  LOCATION: [facility]  DATE: 12/1/2024    INDICATION: Chest pain, abdominal pain, nausea, elevated lactate.  COMPARISON: Chest CT from 9/22/2023.  TECHNIQUE: CT scan of the chest, abdomen, and pelvis was performed following injection of IV contrast. Multiplanar reformats were obtained. Dose reduction techniques were used.   CONTRAST: 91 mL Isovue 370.    FINDINGS:   LUNGS AND PLEURA: Emphysematous changes in the lungs. No acute infiltrates or consolidation. Mild dependent atelectasis. Benign calcified granuloma right middle lobe. No pleural effusions.    MEDIASTINUM/AXILLAE: No adenopathy. Normal heart size. No pericardial effusion. Normal caliber thoracic aorta. Esophagus is partially fluid-filled. Small to moderate hiatal hernia. Bilateral breast implants.    CORONARY ARTERY CALCIFICATION: Severe.    HEPATOBILIARY: Mild diffuse fatty infiltration of the liver. No  calcified gallstones or biliary dilatation.    PANCREAS: Normal.    SPLEEN: Normal.    ADRENAL GLANDS: Normal.    KIDNEYS/BLADDER: Small benign right renal cyst with no follow-up needed. Kidneys otherwise negative. No hydronephrosis. Bladder is unremarkable.    BOWEL: Bowel is normal in caliber with no evidence for obstruction. Negative for appendicitis. Sigmoid diverticulosis without evidence for diverticulitis. Electronic stimulator device noted in the subcutaneous tissues left lower quadrant with lead tips   extending to the distal stomach.    LYMPH NODES: Normal.    VASCULATURE: Diffuse aortoiliac atheromatous disease. Negative for aneurysm.    PELVIC ORGANS: Normal.    MUSCULOSKELETAL: Left shoulder arthroplasty. Mild/moderate degenerative changes in the spine greatest in the lumbar spine.      Impression    IMPRESSION:  1.  Emphysematous changes in the lungs. No evidence for acute pneumonitis.    2.  Small to moderate hiatal hernia with partially fluid-filled esophagus of uncertain significance. Nothing definite for bowel obstruction and no acute bowel findings elsewhere. Upper GI exam could be performed in further evaluation as clinically   warranted.    3.  Unremarkable electronic stimulator device subcutaneous tissues left lower quadrant with lead tips extending to the distal portion of the stomach.    4.  Mild diffuse fatty infiltration of the liver.    5.  Sigmoid diverticulosis without evidence for diverticulitis.    6.  Diffuse atheromatous disease including severe coronary artery calcification.

## 2024-12-02 NOTE — ED TRIAGE NOTES
"Patient presents to the ER with son for complaints of nausea and vomiting. Patient reports that she has been vomiting intermittently all day long and son states, \"she has a bunch of medical problems so I am worried\". Patient has hx of dementia and recent surgery for abdominal stimulator per son.      Triage Assessment (Adult)       Row Name 12/01/24 1940          Triage Assessment    Airway WDL WDL        Respiratory WDL    Respiratory WDL WDL        Skin Circulation/Temperature WDL    Skin Circulation/Temperature WDL WDL        Cardiac WDL    Cardiac WDL WDL        Peripheral/Neurovascular WDL    Peripheral Neurovascular WDL WDL        Cognitive/Neuro/Behavioral WDL    Cognitive/Neuro/Behavioral WDL X                     "

## 2024-12-02 NOTE — PROGRESS NOTES
.RECEIVING UNIT ED HANDOFF REVIEW    ED Nurse Handoff Report was reviewed by: Ele Brice RN on December 2, 2024 at 12:23 AM

## 2024-12-02 NOTE — PROGRESS NOTES
Pt A/Ox2-3. Vss ex tachy NC @ 1L. IV Zofran given for nausea no relief. Compazine suppository given and effective. LR IVF stopped  around 3 am due to loss of IV access. Float resource and flyers attempted new IV placement. IV not able to be established. Vascular consult placed. Provider aware. Stool sample needs to be collected no BM this shift. Clear liquid diet.

## 2024-12-02 NOTE — CONSULTS
[facility]  Gastroenterology Consultation         Grace Jorgensen  [address] [address]  74 year old female    Admission Date/Time: 12/1/2024  Primary Care Provider: Arnulfo Duffy  Referring / Attending Physician:  Dr. Jacy Moses    We were asked to see the patient in consultation by Dr. Jacy Moses for evaluation of gastroparesis.      CC: nausea and vomiting    HPI:  Grace Jorgensen is a 74 year old female who has a PMHx of type 2 diabetes mellitus, paroxysmal A-fib on warfarin, HFpEF, cerebellar ataxia, gastroparesis s/p gastric neurostimulator placement.  Patient presented to the ER with son due to complaints of nausea and vomiting.     On arrival to the ER, vitals notable for tachycardia. Initial labs notable for hyponatremia, lactic acid of 5.3, mild leukocytosis. She also did have troponin elevation of 239. Urinalysis obtained was noninfectious. SARS-CoV-2, influenza and RSV negative. CT chest abdomen pelvis obtained without any acute finding.     She reports that she had the battery replaced in gastric neuro stimulator 3 weeks ago and [facility]. Had the settings reduced to standard settings to preserve the battery. She states was in normal health until 3 days ago when starting vomiting and unable to tolerate any input. She became confused and son brought to ED on 12/2,  She takes omeprazole and prn TUMS for nausea control. Denies use of reglan at home or other antiemetics. Has had no sick contacts.    ROS: A comprehensive ten point review of systems was negative aside from those in mentioned in the HPI.      PAST MED HX:  I have reviewed this patient's medical history and updated it with pertinent information if needed.   Past Medical History:   Diagnosis Date    Cerebral infarction (H)     Depressive disorder     Diabetes (H)     History of blood transfusion     Hypertension        MEDICATIONS:   Prior to Admission Medications    Prescriptions Last Dose Informant Patient Reported? Taking?   Aspirin Effervescent (AVA-SELTZER ORIGINAL PO) Unknown Son Yes Yes   Sig: Take 1-2 tablets by mouth every 4 hours as needed.   albuterol (PROAIR HFA/PROVENTIL HFA/VENTOLIN HFA) 108 (90 Base) MCG/ACT inhaler Unknown Son Yes Yes   Sig: Inhale 1-2 puffs into the lungs every 4 hours as needed for shortness of breath, wheezing or cough.   amLODIPine (NORVASC) 5 MG tablet 11/30/2024 Son Yes Yes   Sig: Take 5 mg by mouth daily.   atorvastatin (LIPITOR) 20 MG tablet 11/30/2024 Son Yes Yes   Sig: Take 20 mg by mouth at bedtime   citalopram (CELEXA) 40 MG tablet 11/30/2024 Son Yes Yes   Sig: Take 40 mg by mouth daily   fluticasone (FLONASE) 50 MCG/ACT nasal spray Unknown Son Yes Yes   Sig: Spray 1 spray into both nostrils daily as needed for rhinitis or allergies.   furosemide (LASIX) 20 MG tablet Unknown Son Yes Yes   Sig: Take 20 mg by mouth daily.   gabapentin (NEURONTIN) 100 MG capsule 11/30/2024 Son Yes Yes   Sig: Take 200 mg by mouth 3 times daily.   ketoconazole (NIZORAL) 2 % external shampoo Unknown Son Yes Yes   Sig: Every two weeks prn   lisinopril (ZESTRIL) 40 MG tablet 11/30/2024 Son Yes Yes   Sig: Take 40 mg by mouth daily.   melatonin 1 MG TABS tablet 11/30/2024 Bedtime Son Yes Yes   Sig: Take 1 mg by mouth at bedtime.   metFORMIN (GLUCOPHAGE) 500 MG tablet 11/30/2024 Morning Son Yes Yes   Sig: Take 500 mg by mouth daily (with breakfast)   multivitamin w/minerals (THERA-VIT-M) tablet Past Week Son No Yes   Sig: Take 1 tablet by mouth daily   pantoprazole (PROTONIX) 20 MG EC tablet 11/30/2024 Son Yes Yes   Sig: Take 20 mg by mouth 2 times daily.   polyethylene glycol (MIRALAX) 17 g packet Unknown Son No Yes   Sig: Take 17 g by mouth 2 times daily as needed for constipation   rizatriptan (MAXALT-MLT) 10 MG ODT Unknown Son Yes Yes   Sig: Take 10 mg by mouth at onset of headache for migraine.   simethicone (MYLICON) 80 MG chewable tablet Unknown Son  Yes Yes   Sig: Take 80 mg by mouth every 6 hours as needed for flatulence or cramping.   sucralfate (CARAFATE) 1 GM tablet Unknown Son Yes Yes   Sig: Take 1 g by mouth 4 times daily.   warfarin ANTICOAGULANT (COUMADIN) 5 MG tablet 11/30/2024 Evening Son Yes Yes   Sig: Take by mouth daily. Take 15 mg Wed, Sat  Take 10 mg all other days      Facility-Administered Medications: None       ALLERGIES:   Allergies   Allergen Reactions    Bupropion GI Disturbance       SOCIAL HISTORY:  Social History     Tobacco Use    Smoking status: Former     Types: Cigarettes    Smokeless tobacco: Never   Vaping Use    Vaping status: Never Used   Substance Use Topics    Alcohol use: Not Currently    Drug use: Never       FAMILY HISTORY:  No family history on file.    PHYSICAL EXAM:   General  alert, oriented and comfortable  Vital Signs with Ranges  Temp: 99.1  F (37.3  C) Temp src: Oral BP: (!) 175/107 Pulse: 95   Resp: 18 SpO2: 93 % O2 Device: None (Room air) Oxygen Delivery: 2 LPM  I/O last 3 completed shifts:  In: 0   Out: 1000 [Urine:1000]    Constitutional: Alert, oriented to person, place, date, situation.  Cooperative, lying in bed in NAD.   Respiratory:  Lungs CTAB.  No crackles, wheezes, or rhonchi, no labored breathing.  Cardiovascular:  Heart RRR, no MRG, no edema.  GI:  Abdomen soft, NT/ND and with normoactive BS  Skin/Integumen:  Warm, dry, non-diaphoretic.  MSK: CMS x4 intact.        ADDITIONAL COMMENTS:   I reviewed the patient's new clinical lab test results.   Recent Labs   Lab Test 12/02/24  1015 12/02/24  0534 12/01/24 1956 01/25/24  1048 01/25/24  0611   WBC  --  12.4* 12.3*  --  7.7   HGB  --  11.2* 12.6  --  7.8*   MCV  --  82 82  --  81   PLT  --  409 478*  --  478*   INR 1.74*  --  2.14* 1.17*  --      Recent Labs   Lab Test 12/02/24  0534 12/01/24 1956 01/25/24  0619   POTASSIUM 3.7 4.3 3.6  3.6   CHLORIDE 98 89* 108*   CO2 19* 18* 22   BUN 17.1 16.2 7.9*   ANIONGAP 13 21* 10     Recent Labs   Lab Test  12/02/24  0534 12/01/24  2151 12/01/24  1956 01/22/24  1556   ALBUMIN 4.1  --  4.6 3.9   BILITOTAL 0.4  --  0.4 0.2   ALT 20  --  22 19   AST 38  --  34 29   PROTEIN  --  600*  --   --    LIPASE  --   --  20  --        I reviewed the patient's new imaging results.        CONSULTATION ASSESSMENT AND PLAN:    zaynab Jorgensen is a 74 year old female who has a PMHx of type 2 diabetes mellitus, paroxysmal A-fib on warfarin, HFpEF, cerebellar ataxia, gastroparesis s/p gastric neurostimulator placement.  Patient presented to the ER with son due to complaints of nausea and vomiting.   Dehydration  Gastroparesis  Nausea and vomiting  S/p gastric neurostimulator- with battery replacement 3 weeks ago and settings decreased  No known sick contacts  DDx includes gastroenteritis vs exacerbation of gastroparesis    - continue reglan 5 mg TID and can increase to 10 mg if nausea and vomiting continues  - Daily PPI  - clear liquid diet and advance as tolerated  - will need to get external remote from clinic and adjust gastric pacemaker- hope to do later today  -               Rupinder Rojo PA-C,  12/3/2024  [facility]  Office : [phone]  Cell: [phone] (Dr. Dotson)  Cell: [phone] (Rupinder Rojo PA-C)

## 2024-12-02 NOTE — PHARMACY-ADMISSION MEDICATION HISTORY
"Pharmacist Admission Medication History    Admission medication history is complete. The information provided in this note is only as accurate as the sources available at the time of the update.    Information Source(s): Family member and CareEverywhere/[employer]Scripts via in-person    Pertinent Information: Son is caregiver for patient and manages medications - somewhat familiar with patient's overall regimen, but unable to fully clarify medication dose details when asked. Son had [facility] medication list from recent AVS pulled up on phone at bedside, which contained a few medications without recent fill history that son believes patient still taking.     -amlodipine: son unsure if dose 10 mg or 5 mg. Has been consistently filled at 5 mg dose, was increased to 10 mg 1/2024 per chart review. Adjusted back to 5 mg dose.   -gabapentin: dose listed as scheduled and PRN. Son thinks dose changed but cannot specify most current. Thinks taking on scheduled basis, not as needed. 100 mg capsule filled 12/1/24 #30ds #180 - adjusted dose to scheduled directions only.   -pantoprazole: dose listed as 40 mg daily and 80 mg daily - [facility] list showed dose 20 mg BID, son unsure if patient was taking higher dose recently. Medication history note from 1/2024 indicated higher dosing at that time was due to abdominal pain. Filled 12/1/24 #30ds #60, adjusted current entry on PTA medication list to reflect fill history.     Warfarin: most recent clinic visit 11/20/24   Goal INR 2-3  Indication: atrial fibrillation   Dose 15 mg Wed, Sat, 10 mg all other days  Son is not 100% sure on compliance but believes patient took dose PM 11/30/24.     Changes made to PTA medication list:  Added: flonase, rizatriptan, lasix, melatonin, simethicone, sucralfate, kecia seltzer   Deleted:   Ferrous sulfate 324 mg daily (last filled 1/25/24 #30ds, family confirms patient not taking)   Warfarin duplicate entry \"hold until INR therapeutic\" " "  Changed:   Albuterol PRN directions  Amlodipine 10 mg -> 5 mg (see above)   Lisinopril \"hold until recheck kidney function in 3-5 days\" -> 40 mg daily (filled 11/26/24 #90ds)   Pantoprazole 40 mg daily/80 mg daily -> 20 mg BID (see above)   Warfarin dosing 10 mg once dose, then recheck -> 15 mg Wed/Sat, 10 mg all other days     Allergies reviewed with patient and updates made in EHR: no    Medication History Completed By: Juani Ramirez [facility] 12/1/2024 10:21 PM    PTA Med List   Medication Sig Note Last Dose/Taking    albuterol (PROAIR HFA/PROVENTIL HFA/VENTOLIN HFA) 108 (90 Base) MCG/ACT inhaler Inhale 1-2 puffs into the lungs every 4 hours as needed for shortness of breath, wheezing or cough. 12/1/2024: Rare use per son Unknown    amLODIPine (NORVASC) 5 MG tablet Take 5 mg by mouth daily.  11/30/2024    Aspirin Effervescent (AVA-SELTZER ORIGINAL PO) Take 1-2 tablets by mouth every 4 hours as needed. 12/1/2024: Family states using \"a lot\" without clarifying further Unknown    atorvastatin (LIPITOR) 20 MG tablet Take 20 mg by mouth at bedtime  11/30/2024    citalopram (CELEXA) 40 MG tablet Take 40 mg by mouth daily  11/30/2024    fluticasone (FLONASE) 50 MCG/ACT nasal spray Spray 1 spray into both nostrils daily as needed for rhinitis or allergies.  Unknown    furosemide (LASIX) 20 MG tablet Take 20 mg by mouth daily. 12/1/2024: No fill history in last 12 months, present on [facility] Everywhere medication list and son thinks still taking Unknown    gabapentin (NEURONTIN) 100 MG capsule Take 200 mg by mouth 3 times daily.  11/30/2024    ketoconazole (NIZORAL) 2 % external shampoo Every two weeks prn  Unknown    lisinopril (ZESTRIL) 40 MG tablet Take 40 mg by mouth daily.  11/30/2024    melatonin 1 MG TABS tablet Take 1 mg by mouth at bedtime.  11/30/2024 Bedtime    metFORMIN (GLUCOPHAGE) 500 MG tablet Take 500 mg by mouth daily (with breakfast)  11/30/2024 Morning    multivitamin w/minerals " (THERA-VIT-M) tablet Take 1 tablet by mouth daily  Past Week    pantoprazole (PROTONIX) 20 MG EC tablet Take 20 mg by mouth 2 times daily.  11/30/2024    polyethylene glycol (MIRALAX) 17 g packet Take 17 g by mouth 2 times daily as needed for constipation  Unknown    rizatriptan (MAXALT-MLT) 10 MG ODT Take 10 mg by mouth at onset of headache for migraine.  Unknown    simethicone (MYLICON) 80 MG chewable tablet Take 80 mg by mouth every 6 hours as needed for flatulence or cramping.  Unknown    sucralfate (CARAFATE) 1 GM tablet Take 1 g by mouth 4 times daily. 12/1/2024: No fill history in last 12 months, present on [facility] Everywhere medication list and son thinks still taking Unknown    warfarin ANTICOAGULANT (COUMADIN) 5 MG tablet Take by mouth daily. Take 15 mg Wed, Sat  Take 10 mg all other days  11/30/2024 Evening

## 2024-12-02 NOTE — ED PROVIDER NOTES
"  Emergency Department Note      History of Present Illness     Chief Complaint   Nausea & Vomiting      HPI   Theodora Jorgensen is a 74 year old female on Warfarin with a history of stroke, CHF, GERD, type 2 diabetes, hypertension, hyperlipidemia, and anemia presenting to the ED with nausea and vomiting; her son Anand and his girlfriend Елнеа, reports that she has had intermittent vomiting all day and not been able to keep anything down. The son reports receiving a call from her at noon today in which she said had been having emesis throughout the night, being unable to sleep. He had left her at 1900 last night and she was fine. When he got home he states she was \" pretty incoherent\". She seems to have improved since. Upon re-examination, theodora endorses nausea, headache and chest pain. She has abdominal bruising from lovenox injections. She has not taken of her medication today as per her son. The patient denies abdominal pain, back pain, diarrhea or dysuria. Of note, the patient had a recent stomach procedure for a device battery change a couple of day ago; her son is unsure what the device is for.     Independent Historian   Son as detailed above.    Review of External Notes   1/22/24 Hospital discharge summary from Dr. Cruz    Past Medical History     Medical History and Problem List   Cerebral infarction   Chronic heart failure with preserved ejection fraction   Embolic stroke   Gastroesophageal reflux disease  Paroxysmal atrial fibrillation   Type 2 diabetes mellitus   Tobacco use disorder  S/P mastectomy  Vitamin D deficiency  Restless leg syndrome  Iron deficiency anemia  Hyponatremia  Hypertension  Hyperlipidemia  Humerus head fracture  Gastroparesis  Hallux valgus (acquired), right foot  Dyslipidemia  Depression  Anemia, unspecified  Anxiety state  Temporal arteritis     Medications "   Amlodipine  Atorvastatin  Citalopram  Duloextine  Gabapentin  Enoxaparin  Lisinopril  Meloxicam  Metformin  Pantoprazole  Prednisone  Coumadin    Surgical History   Mastectomy  Pyelotomy  Hernia repair  Breast augmentation  Colonoscopy   ECG  Orthopedic surgery: broken shoulder with janie in humerus.     Physical Exam     Patient Vitals for the past 24 hrs:   BP Temp Temp src Pulse Resp SpO2   12/01/24 2332 (!) 127/93 -- -- 113 -- 92 %   12/01/24 2213 -- 99.1  F (37.3  C) Temporal -- -- --   12/01/24 1942 111/73 -- -- 103 20 94 %     Physical Exam  General:  Sitting on bed.  HENT:  No obvious trauma to head  Right Ear:  External ear normal.   Left Ear:  External ear normal.   Nose:  Nose normal.   Eyes:  Conjunctivae and EOM are normal. Pupils are equal, round, and reactive.   Neck: Normal range of motion. Neck supple. No tracheal deviation present.   CV:  Normal heart sounds. No murmur heard.  Pulm/Chest: Effort normal and breath sounds normal.   Abd: Soft. No distension. There is very slight nonfocal diffuse tenderness.  Abdominal device surgical site on the left lower abdomen has a clean dry and intact surgical site.  There is no rounding erythema, purulence, crepitus or significant tenderness in this area.  There is no rigidity, no rebound and no guarding.   M/S: Normal range of motion.   Neuro: Awake and alert. CN II-XII Grossly intact, no pronator drift, normal finger-nose-finger, visual fields intact by confrontation. Muscle strength is +5 proximal and distal in the bilateral upper and lower extremities. No dysarthria. Normal palm up, palm down.  Skin: Skin is warm and dry. No rash noted. Not diaphoretic.   Psych: Normal mood and affect. Behavior is normal.      Diagnostics     Lab Results   Labs Ordered and Resulted from Time of ED Arrival to Time of ED Departure   GLUCOSE BY METER - Abnormal       Result Value    GLUCOSE BY METER POCT 174 (*)    COMPREHENSIVE METABOLIC PANEL - Abnormal    Sodium 128 (*)      Potassium 4.3      Carbon Dioxide (CO2) 18 (*)     Anion Gap 21 (*)     Urea Nitrogen 16.2      Creatinine 0.90      GFR Estimate 66      Calcium 9.5      Chloride 89 (*)     Glucose 171 (*)     Alkaline Phosphatase 95      AST 34      ALT 22      Protein Total 8.4 (*)     Albumin 4.6      Bilirubin Total 0.4     INR - Abnormal    INR 2.14 (*)    CBC WITH PLATELETS AND DIFFERENTIAL - Abnormal    WBC Count 12.3 (*)     RBC Count 4.48      Hemoglobin 12.6      Hematocrit 36.6      MCV 82      MCH 28.1      MCHC 34.4      RDW 15.9 (*)     Platelet Count 478 (*)     % Neutrophils 80      % Lymphocytes 15      % Monocytes 4      % Eosinophils 0      % Basophils 0      % Immature Granulocytes 0      NRBCs per 100 WBC 0      Absolute Neutrophils 9.8 (*)     Absolute Lymphocytes 1.9      Absolute Monocytes 0.5      Absolute Eosinophils 0.1      Absolute Basophils 0.0      Absolute Immature Granulocytes 0.0      Absolute NRBCs 0.0     ROUTINE UA WITH MICROSCOPIC REFLEX TO CULTURE - Abnormal    Color Urine Light Yellow      Appearance Urine Clear      Glucose Urine 150 (*)     Bilirubin Urine Negative      Ketones Urine Negative      Specific Gravity Urine 1.015      Blood Urine Large (*)     pH Urine 7.5 (*)     Protein Albumin Urine 600 (*)     Urobilinogen Urine Normal      Nitrite Urine Negative      Leukocyte Esterase Urine Negative      Mucus Urine Present (*)     RBC Urine 6 (*)     WBC Urine 5      Squamous Epithelials Urine <1     ISTAT GASES LACTATE VENOUS POCT - Abnormal    Lactic Acid POCT 5.3 (*)     Bicarbonate Venous POCT 21      O2 Sat, Venous POCT 43 (*)     pCO2 Venous POCT 33 (*)     pH Venous POCT 7.42      pO2 Venous POCT 24 (*)     Base Excess/Deficit (+/-) POCT -2.0     ISTAT GASES LACTATE VENOUS POCT - Abnormal    Lactic Acid POCT 2.6 (*)     Bicarbonate Venous POCT 17 (*)     O2 Sat, Venous POCT 80 (*)     pCO2 Venous POCT 28 (*)     pH Venous POCT 7.40      pO2 Venous POCT 44      Base Excess/Deficit  (+/-) POCT -7.0 (*)    TROPONIN T, HIGH SENSITIVITY - Abnormal    Troponin T, High Sensitivity 239 (*)    LIPASE - Normal    Lipase 20     LACTIC ACID WHOLE BLOOD - Normal    Lactic Acid 1.6     INFLUENZA A/B, RSV AND SARS-COV2 PCR - Normal    Influenza A PCR Negative      Influenza B PCR Negative      RSV PCR Negative      SARS CoV2 PCR Negative     LACTIC ACID WHOLE BLOOD   TROPONIN T, HIGH SENSITIVITY   BLOOD CULTURE       Imaging   CT Chest/Abdomen/Pelvis w Contrast   Final Result   IMPRESSION:   1.  Emphysematous changes in the lungs. No evidence for acute pneumonitis.      2.  Small to moderate hiatal hernia with partially fluid-filled esophagus of uncertain significance. Nothing definite for bowel obstruction and no acute bowel findings elsewhere. Upper GI exam could be performed in further evaluation as clinically    warranted.      3.  Unremarkable electronic stimulator device subcutaneous tissues left lower quadrant with lead tips extending to the distal portion of the stomach.      4.  Mild diffuse fatty infiltration of the liver.      5.  Sigmoid diverticulosis without evidence for diverticulitis.      6.  Diffuse atheromatous disease including severe coronary artery calcification.          EKG   ECG results from 12/01/24   EKG 12-lead, tracing only     Value    Systolic Blood Pressure     Diastolic Blood Pressure     Ventricular Rate 106    Atrial Rate 106    HI Interval 162    QRS Duration 90        QTc 499    P Axis 72    R AXIS 34    T Axis 70    Interpretation ECG      Sinus tachycardia  Nonspecific ST and T wave abnormality  Abnormal ECG  When compared with ECG of 09-Nov-2022 15:52,  ST now depressed in Lateral leads  Nonspecific T wave abnormality no longer evident in Anterior leads  QT has lengthened  EKG interpreted by me at 2215        Independent Interpretation   None    ED Course      Medications Administered   Medications   sodium chloride (PF) 0.9% PF flush 3 mL (has no administration  in time range)   sodium chloride (PF) 0.9% PF flush 3 mL (3 mLs Intracatheter Not Given 12/1/24 2043)   sodium chloride 0.9% BOLUS 2,448 mL (0 mLs Intravenous Stopped 12/1/24 2230)   Saline Flush - CT (68 mLs Intravenous $Given 12/1/24 2233)   iopamidol (ISOVUE-370) solution 91 mL (91 mLs Intravenous $Given 12/1/24 2232)   ondansetron (ZOFRAN) injection 4 mg (4 mg Intravenous $Given 12/1/24 2242)       Procedures   Procedures     Discussion of Management   Admitting Hospitalist, Catalino    ED Course   ED Course as of 12/02/24 0031   Sun Dec 01, 2024   2008 I obtained the history and examined the patient as noted above.     2135 I rechecked the patient and explained findings.    2352 I spoke to the hospitalist, , who has agreed to admit the patient for continued evaluation and treatment.         Additional Documentation  None    Medical Decision Making / Diagnosis     CMS Diagnoses: Lactic acid elevated due to n/v and dehydration. At this time there are no signs of sepsis or septic shock    MIPS       None    MDM   Grace Jorgensen is a very pleasant 74 year old female who presents with concern of nausea and vomiting.  She feels weak overall.  She also reports a mild headache, chest pain and abdominal pain.  Patient is on warfarin.  INR is therapeutic.  No head trauma.  The patient has a gastric stimulator and the battery was replaced recently.  Surgical site looks clean dry and intact.  She did not have any significant abdominal pain but did report very slight tenderness throughout and nothing focal.  Labs were performed and lactate was markedly elevated.  Her temperature is 99.1 degrees.  She has no clear source of infection.  Straight cath urine analysis is negative for UTI.  Given of diffuse pains, a CT scan was performed of the chest, abdomen and pelvis.  Incidental findings as above, but no acute pathology such as pneumonia, diverticulitis, colitis, appendicitis, cholecystitis, etc.     No rash on  body to suggest cellulitis.  No rash over the abdomen over the surgical site to suggest postoperative infection.  No abscess seen on CT around this.  She did report a very mild headache.  She has no meningeal signs to suggest meningitis.  I reviewed this possibility with the son and son's girlfriend and reviewed the risk and benefit of performing an LP to definitively assess for this and after doing so they are in agreement against it.  I highly doubt meningitis.  One blood culture was obtained.  She was provided the 30 cc/kg fluid bolus given the initial lactate and her lactate did clear with this.  Given no definitive source of infection, antibiotics were not provided as my suspicion for sepsis is extremely low and I suspect her symptoms are related or the likely to a GI illness with her nausea and vomiting.  No loose stools yet, both loose stools occurred during hospitalization a stool test could be helpful to identify a source of this illness.  The patient did not have any focal chest pain or chest pressure, but it was reported as diffuse pain to the body.  Screening EKG did not show any acute ST changes.  Troponin was obtained and found to be elevated but given no significant ST changes, low-grade temperature, nausea vomiting and elevated lactate, this may be reactive and a delta troponin is pending.  She is on warfarin and therapeutic so heparinization is not necessary at this time.  This may be ACS or non-STEMI, but serial troponins will be of benefit.  COVID, influenza and RSV are negative.  I spoke to the hospitalist above who has agreed to admit the patient for continued evaluation and treatment.    Disposition   The patient was admitted to the hospital.     Diagnosis     ICD-10-CM    1. Generalized weakness  R53.1       2. Dehydration  E86.0       3. Elevated lactic acid level  R79.89            Discharge Medications   New Prescriptions    No medications on file     Scribe Disclosure:  Marquita TAM am  serving as a scribe at 8:31 PM on 12/1/2024 to document services personally performed by Aniceto Maier DO based on my observations and the provider's statements to me.        Aniceto Maier,   12/02/24 0006       Aniceto Maier DO  12/02/24 0031

## 2024-12-02 NOTE — ED NOTES
[facility]  ED Nurse Handoff Report    ED Chief complaint: Nausea & Vomiting      ED Diagnosis:   Final diagnoses:   None       Code Status: TBD    Allergies:   Allergies   Allergen Reactions    Bupropion GI Disturbance       Patient Story: nausea and vomiting   Focused Assessment:  pt's son states that the patient has been vomiting since yesterday. Pt normally lives alone. Son is concerned about other medical problems. Pt had an initial high lactic. Pt has had 1 emesis in the ED which was when pt went to CT    Treatments and/or interventions provided: see MAR  Patient's response to treatments and/or interventions: good    To be done/followed up on inpatient unit:  monitor    Does this patient have any cognitive concerns?:  none known     Activity level - Baseline/Home:  Independent  Activity Level - Current:   Stand with Assist    Patient's Preferred language: English   Needed?: No    Isolation: None  Infection: Not Applicable  Patient tested for COVID 19 prior to admission: YES  Bariatric?: No    Vital Signs:   Vitals:    12/01/24 1942 12/01/24 2213   BP: 111/73    Pulse: 103    Resp: 20    Temp:  99.1  F (37.3  C)   TempSrc:  Temporal   SpO2: 94%        Cardiac Rhythm:     Was the PSS-3 completed:   Yes  What interventions are required if any?               Family Comments: son and daughter in law here  OBS brochure/video discussed/provided to patient/family: No                For the majority of the shift this patient's behavior was Green.   Behavioral interventions performed were NA.    ED NURSE PHONE NUMBER: *[id number]

## 2024-12-03 ENCOUNTER — APPOINTMENT (OUTPATIENT)
Dept: PHYSICAL THERAPY | Facility: CLINIC | Age: 74
DRG: 640 | End: 2024-12-03
Attending: INTERNAL MEDICINE
Payer: COMMERCIAL

## 2024-12-03 ENCOUNTER — APPOINTMENT (OUTPATIENT)
Dept: CARDIOLOGY | Facility: CLINIC | Age: 74
DRG: 640 | End: 2024-12-03
Attending: INTERNAL MEDICINE
Payer: COMMERCIAL

## 2024-12-03 LAB
ATRIAL RATE - MUSE: 89 BPM
ATRIAL RATE - MUSE: 96 BPM
DIASTOLIC BLOOD PRESSURE - MUSE: NORMAL MMHG
DIASTOLIC BLOOD PRESSURE - MUSE: NORMAL MMHG
ERYTHROCYTE [DISTWIDTH] IN BLOOD BY AUTOMATED COUNT: 16.5 % (ref 10–15)
HCT VFR BLD AUTO: 32.2 % (ref 35–47)
HGB BLD-MCNC: 10.9 G/DL (ref 11.7–15.7)
INR PPP: 2.02 (ref 0.85–1.15)
INTERPRETATION ECG - MUSE: NORMAL
INTERPRETATION ECG - MUSE: NORMAL
LVEF ECHO: NORMAL
MCH RBC QN AUTO: 28.2 PG (ref 26.5–33)
MCHC RBC AUTO-ENTMCNC: 33.9 G/DL (ref 31.5–36.5)
MCV RBC AUTO: 83 FL (ref 78–100)
P AXIS - MUSE: 57 DEGREES
P AXIS - MUSE: 60 DEGREES
PLATELET # BLD AUTO: 421 10E3/UL (ref 150–450)
PR INTERVAL - MUSE: 160 MS
PR INTERVAL - MUSE: 160 MS
QRS DURATION - MUSE: 88 MS
QRS DURATION - MUSE: 90 MS
QT - MUSE: 394 MS
QT - MUSE: 402 MS
QTC - MUSE: 479 MS
QTC - MUSE: 507 MS
R AXIS - MUSE: 61 DEGREES
R AXIS - MUSE: 68 DEGREES
RBC # BLD AUTO: 3.87 10E6/UL (ref 3.8–5.2)
SYSTOLIC BLOOD PRESSURE - MUSE: NORMAL MMHG
SYSTOLIC BLOOD PRESSURE - MUSE: NORMAL MMHG
T AXIS - MUSE: 58 DEGREES
T AXIS - MUSE: 64 DEGREES
VENTRICULAR RATE- MUSE: 89 BPM
VENTRICULAR RATE- MUSE: 96 BPM
WBC # BLD AUTO: 15 10E3/UL (ref 4–11)

## 2024-12-03 PROCEDURE — 85610 PROTHROMBIN TIME: CPT | Performed by: INTERNAL MEDICINE

## 2024-12-03 PROCEDURE — 250N000011 HC RX IP 250 OP 636: Performed by: INTERNAL MEDICINE

## 2024-12-03 PROCEDURE — 99233 SBSQ HOSP IP/OBS HIGH 50: CPT | Performed by: INTERNAL MEDICINE

## 2024-12-03 PROCEDURE — 97161 PT EVAL LOW COMPLEX 20 MIN: CPT | Mod: GP

## 2024-12-03 PROCEDURE — C8929 TTE W OR WO FOL WCON,DOPPLER: HCPCS

## 2024-12-03 PROCEDURE — 258N000003 HC RX IP 258 OP 636: Performed by: INTERNAL MEDICINE

## 2024-12-03 PROCEDURE — 85018 HEMOGLOBIN: CPT | Performed by: INTERNAL MEDICINE

## 2024-12-03 PROCEDURE — 36415 COLL VENOUS BLD VENIPUNCTURE: CPT | Performed by: INTERNAL MEDICINE

## 2024-12-03 PROCEDURE — 99232 SBSQ HOSP IP/OBS MODERATE 35: CPT | Mod: 25 | Performed by: NURSE PRACTITIONER

## 2024-12-03 PROCEDURE — 97530 THERAPEUTIC ACTIVITIES: CPT | Mod: GP

## 2024-12-03 PROCEDURE — 255N000002 HC RX 255 OP 636: Performed by: INTERNAL MEDICINE

## 2024-12-03 PROCEDURE — 93005 ELECTROCARDIOGRAM TRACING: CPT

## 2024-12-03 PROCEDURE — 999N000208 ECHOCARDIOGRAM COMPLETE

## 2024-12-03 PROCEDURE — 250N000013 HC RX MED GY IP 250 OP 250 PS 637: Performed by: INTERNAL MEDICINE

## 2024-12-03 PROCEDURE — 120N000001 HC R&B MED SURG/OB

## 2024-12-03 PROCEDURE — 250N000009 HC RX 250: Performed by: INTERNAL MEDICINE

## 2024-12-03 PROCEDURE — 93306 TTE W/DOPPLER COMPLETE: CPT | Mod: 26 | Performed by: INTERNAL MEDICINE

## 2024-12-03 RX ORDER — LORAZEPAM 2 MG/ML
0.5 INJECTION INTRAMUSCULAR EVERY 4 HOURS PRN
Status: DISCONTINUED | OUTPATIENT
Start: 2024-12-03 | End: 2024-12-04 | Stop reason: HOSPADM

## 2024-12-03 RX ORDER — ONDANSETRON 2 MG/ML
4 INJECTION INTRAMUSCULAR; INTRAVENOUS EVERY 6 HOURS PRN
Status: DISCONTINUED | OUTPATIENT
Start: 2024-12-03 | End: 2024-12-03

## 2024-12-03 RX ORDER — ONDANSETRON 4 MG/1
4 TABLET, ORALLY DISINTEGRATING ORAL EVERY 6 HOURS PRN
Status: DISCONTINUED | OUTPATIENT
Start: 2024-12-03 | End: 2024-12-03

## 2024-12-03 RX ORDER — WARFARIN SODIUM 5 MG/1
10 TABLET ORAL
Status: COMPLETED | OUTPATIENT
Start: 2024-12-03 | End: 2024-12-03

## 2024-12-03 RX ADMIN — SUCRALFATE 1 G: 1 TABLET ORAL at 18:18

## 2024-12-03 RX ADMIN — METOPROLOL TARTRATE 25 MG: 25 TABLET, FILM COATED ORAL at 08:33

## 2024-12-03 RX ADMIN — PANTOPRAZOLE SODIUM 40 MG: 40 INJECTION, POWDER, FOR SOLUTION INTRAVENOUS at 08:38

## 2024-12-03 RX ADMIN — CITALOPRAM HYDROBROMIDE 40 MG: 20 TABLET ORAL at 08:37

## 2024-12-03 RX ADMIN — METOCLOPRAMIDE 5 MG: 5 INJECTION, SOLUTION INTRAMUSCULAR; INTRAVENOUS at 22:14

## 2024-12-03 RX ADMIN — SUCRALFATE 1 G: 1 TABLET ORAL at 22:12

## 2024-12-03 RX ADMIN — METOCLOPRAMIDE 5 MG: 5 INJECTION, SOLUTION INTRAMUSCULAR; INTRAVENOUS at 08:38

## 2024-12-03 RX ADMIN — SODIUM CHLORIDE, POTASSIUM CHLORIDE, SODIUM LACTATE AND CALCIUM CHLORIDE: 600; 310; 30; 20 INJECTION, SOLUTION INTRAVENOUS at 12:03

## 2024-12-03 RX ADMIN — SUCRALFATE 1 G: 1 TABLET ORAL at 13:30

## 2024-12-03 RX ADMIN — LISINOPRIL 40 MG: 40 TABLET ORAL at 08:37

## 2024-12-03 RX ADMIN — AMLODIPINE BESYLATE 5 MG: 5 TABLET ORAL at 08:38

## 2024-12-03 RX ADMIN — WARFARIN SODIUM 10 MG: 5 TABLET ORAL at 18:18

## 2024-12-03 RX ADMIN — METOPROLOL TARTRATE 25 MG: 25 TABLET, FILM COATED ORAL at 20:40

## 2024-12-03 RX ADMIN — GABAPENTIN 200 MG: 100 CAPSULE ORAL at 16:02

## 2024-12-03 RX ADMIN — METOCLOPRAMIDE 5 MG: 5 INJECTION, SOLUTION INTRAMUSCULAR; INTRAVENOUS at 16:02

## 2024-12-03 RX ADMIN — SIMETHICONE 80 MG: 80 TABLET, CHEWABLE ORAL at 23:54

## 2024-12-03 RX ADMIN — GABAPENTIN 200 MG: 100 CAPSULE ORAL at 22:11

## 2024-12-03 RX ADMIN — SUCRALFATE 1 G: 1 TABLET ORAL at 08:38

## 2024-12-03 RX ADMIN — GABAPENTIN 200 MG: 100 CAPSULE ORAL at 08:38

## 2024-12-03 RX ADMIN — PERFLUTREN 10 ML: 6.52 INJECTION, SUSPENSION INTRAVENOUS at 11:49

## 2024-12-03 ASSESSMENT — ACTIVITIES OF DAILY LIVING (ADL)
ADLS_ACUITY_SCORE: 67
ADLS_ACUITY_SCORE: 66
ADLS_ACUITY_SCORE: 67
ADLS_ACUITY_SCORE: 62
ADLS_ACUITY_SCORE: 67
ADLS_ACUITY_SCORE: 66
ADLS_ACUITY_SCORE: 67

## 2024-12-03 NOTE — PROGRESS NOTES
[facility]    Hospitalist Progress Note    Assessment & Plan   Grace Jorgensen is a 74 year old female hx of history of type 2 diabetes mellitus, paroxysmal A-fib on warfarin, HFpEF, cerebellar ataxia, gastroparesis s/p gastric neurostimulator placement.  Patient presents to the ER with son due to complaints of nausea and vomiting.   On arrival to the ER, vitals notable for tachycardia.  Initial labs notable for hyponatremia, lactic acid of 5.3, mild leukocytosis.  She received 2 L of normal saline bolus with improvement of lactic acid to 2.6.  She also did have troponin elevation of 239.  Urinalysis obtained was noninfectious.  SARS-CoV-2, influenza and RSV negative.  CT chest abdomen pelvis obtained without any acute finding.         Nausea, Vomiting    Dehydration    Elevated lactic acid - improving    Hyponatremia    Troponinemia - likely due to demand ischemia in the setting of dehydration  Acute toxic metabolic encephalopathy given baseline dementia-likely due to above  -Patient is presenting from home with 1 day onset of nausea, vomiting, poor p.o. intake per discussion with patient's son.  Patient was more than usual confused on admission, 3 weeks ago her gastric stimulator battery was changed and the settings were changed as well, and son is concerned whether that is triggering her nausea and vomiting.  -Patient had presented with elevated lactic acid, hyponatremia, elevated troponin, all these improved with hydration.  -Seen by cardiology team, they are recommending hydration and monitoring, echocardiogram obtained and did not indicate any wall motion abnormalities, no other significant change of symptoms, current plan is to adjust medications, metoprolol was added this admission.  -Started on Reglan 5 mg 3 times daily, plan noted to adjust her gastric stimulator later today.  -Diet advanced, so far no sign of infection noted except for elevated white count, this could be from  stress, UA does not indicate any UTI, she had CT chest abdomen and pelvis which did not indicate any specific cause of concern.  This could be from gastroenteritis but as per history her symptoms have been slowly going going on since the stimulator battery change.  --Will closely monitor labs, continue fluids.  Currently managed monitoring of antibiotics.  -She is also on other medications including sucralfate and PPI.        Chronic Medical Issues-majority of her medications could not be verified except for warfarin which has been continued.  # Hypertension -PTA meds has been restarted.  # ?HFpEF-continue gentle hydration for now.  # Paroxysmal L-poh-melxtner to dose warfarin.  INR this admission 2.14  -Could not verify most medications other than patient taking warfarin.     # GERD; Hiatal Hernia-unclear if patient is on Protonix; will order Protonix every morning.  # Diabetes mellitus, type II-A1c today to go 6.7%.  Hold PTA metformin.     Diet :Full Liquid Diet  Snacks/Supplements Adult: Magic Cup; With Meals  Snacks/Supplements Adult: Boost Soothe; With Meals  DVT Prophylaxis: Warfarin  Code Status: Full Code     51 MINUTES SPENT BY ME on the date of service doing chart review, history, exam, documentation & further activities per the note.  Medically Ready for Discharge: Anticipated Tomorrow    Clinically Significant Risk Factors Present on Admission         # Hyponatremia: Lowest Na = 128 mmol/L in last 2 days, will monitor as appropriate  # Hypochloremia: Lowest Cl = 89 mmol/L in last 2 days, will monitor as appropriate     # Anion Gap Metabolic Acidosis: Highest Anion Gap = 21 mmol/L in last 2 days, will monitor and treat as appropriate   # Drug Induced Coagulation Defect: home medication list includes an anticoagulant medication    # Hypertension: Noted on problem list      # Anemia: based on hgb <11       # Obesity: Estimated body mass index is 31.57 kg/m  as calculated from the following:    Height as of  "this encounter: 1.582 m (5' 2.28\").    Weight as of this encounter: 79 kg (174 lb 2.6 oz).              Jacy Moses MD  Text Page   (7am to 6pm)    Interval History   Her symptoms have slightly improved, still has nausea, discussed with the family, will get nutrition consult and advance diet, appreciate input from GI, they might adjust her gastric stimulator, so far no sign of infection.  Son was near bedside, discussed plan of care.    -Data reviewed today: I reviewed all new labs and imaging results over the last 24 hours.   Physical Exam     Vital Signs with Ranges  Temp:  [97.6  F (36.4  C)-99.1  F (37.3  C)] 98  F (36.7  C)  Pulse:  [] 89  Resp:  [16-18] 16  BP: (146-190)/() 150/87  SpO2:  [92 %-97 %] 97 %  I/O last 3 completed shifts:  In: 0   Out: 1212 [Urine:1212]    Constitutional: Awake, alert, cooperative, no apparent distress  Respiratory: Clear to auscultation bilaterally, no crackles or wheezing  Cardiovascular: Regular rate and rhythm, normal S1 and S2, and no murmur noted  GI: Normal bowel sounds, soft, non-distended, non-tender  Skin/Integumen: No rashes, no cyanosis, no edema  Neuro : moving all 4 extremities, confused .    Medications   Current Facility-Administered Medications   Medication Dose Route Frequency Provider Last Rate Last Admin    lactated ringers infusion   Intravenous Continuous Jacy Moses MD 75 mL/hr at 12/03/24 1203 New Bag at 12/03/24 1203    Patient is already receiving anticoagulation with heparin, enoxaparin (LOVENOX), warfarin (COUMADIN)  or other anticoagulant medication   Does not apply Continuous PRN Donnie Bae MD         Current Facility-Administered Medications   Medication Dose Route Frequency Provider Last Rate Last Admin    amLODIPine (NORVASC) tablet 5 mg  5 mg Oral Daily Jacy Moses MD   5 mg at 12/03/24 0838    citalopram (celeXA) tablet 40 mg  40 mg Oral Daily Jacy Moses MD   40 mg at 12/03/24 0837    gabapentin (NEURONTIN) " capsule 200 mg  200 mg Oral TID Jacy Moses MD   200 mg at 24 0838    lisinopril (ZESTRIL) tablet 40 mg  40 mg Oral Daily Jacy Moses MD   40 mg at 24 0837    metoclopramide (REGLAN) injection 5 mg  5 mg Intravenous TID Jacy Moses MD   5 mg at 24 0838    metoprolol tartrate (LOPRESSOR) tablet 25 mg  25 mg Oral BID Sandeep Russell MD   25 mg at 24 0833    pantoprazole (PROTONIX) IV push injection 40 mg  40 mg Intravenous Daily with breakfast Jacy Moses MD   40 mg at 24 0838    sucralfate (CARAFATE) tablet 1 g  1 g Oral 4x Daily Jacy Moses MD   1 g at 24 0838    Warfarin Dose Required Daily - Pharmacist Managed  1 each Oral See Admin Instructions Mario Bae MD           Data   Recent Labs   Lab 24  0916 24  1015 24  0534 246 24  194   WBC 15.0*  --  12.4* 12.3*  --    HGB 10.9*  --  11.2* 12.6  --    MCV 83  --  82 82  --      --  409 478*  --    INR 2.02* 1.74*  --  2.14*  --    NA  --   --  130* 128*  --    POTASSIUM  --   --  3.7 4.3  --    CHLORIDE  --   --  98 89*  --    CO2  --   --  19* 18*  --    BUN  --   --  17.1 16.2  --    CR  --   --  0.79 0.90  --    ANIONGAP  --   --  13 21*  --    SERA  --   --  8.7* 9.5  --    GLC  --   --  150* 171* 174*   ALBUMIN  --   --  4.1 4.6  --    PROTTOTAL  --   --  7.2 8.4*  --    BILITOTAL  --   --  0.4 0.4  --    ALKPHOS  --   --  79 95  --    ALT  --   --  20 22  --    AST  --   --  38 34  --    LIPASE  --   --   --  20  --      Recent Labs   Lab 24  0534 24  1941   * 171* 174*       Imaging:   Recent Results (from the past 24 hours)   Echocardiogram Complete   Result Value    LVEF  55-60%    Narrative    [id number]  [id number]  [id number]  [id number]^AKINLOLU^MARIO     [facility]  Echocardiography Laboratory  [address] [place] [address]     Name: JOSE JCARLOS A  MRN: [id number]  :  1950  Study Date: 12/03/2024 11:17 AM  Age: 74 yrs  Gender: Female  Patient Location: [facility]  Reason For Study: Tachycardia  Ordering Physician: MARIO MARTINEZ  Referring Physician: Arnulfo Duffy  Performed By: Isaiah Troy RDCS     BSA: 1.8 m2  Height: 62 in  Weight: 174 lb  HR: 84  BP: 150/87 mmHg  ______________________________________________________________________________  Procedure  Complete Portable Echo Adult. Definity (NDC #55715-357) given intravenously.  Technically difficult study.  ______________________________________________________________________________  Interpretation Summary     Left ventricular systolic function is normal.  The visual ejection fraction is 55-60%.  No regional wall motion abnormalities.  Normal right ventricular size and systolic function.  No significant valve disease.  Dilated inferior vena cava.  Technically difficult study.     There is no comparison study available.  ______________________________________________________________________________  Left Ventricle  The left ventricle is normal in size. There is normal left ventricular wall  thickness. Left ventricular systolic function is normal. The visual ejection  fraction is 55-60%. Grade I or early diastolic dysfunction. No regional wall  motion abnormalities noted.     Right Ventricle  The right ventricle is not well visualized. The right ventricle is normal in  size and function.     Atria  The left atrium is severely dilated. Right atrium not well visualized. There  is no atrial shunt seen.     Mitral Valve  The mitral valve leaflets appear normal. There is no evidence of stenosis,  fluttering, or prolapse. There is mild mitral annular calcification. There is  trace mitral regurgitation. There is no mitral valve stenosis. The mean mitral  valve gradient is 1.6 mmHg.     Tricuspid Valve  The tricuspid valve is not well visualized. There is mild (1+) tricuspid  regurgitation. The right ventricular systolic  pressure is approximated at 47.1  mmHg plus the right atrial pressure.     Aortic Valve  The aortic valve is not well visualized. No aortic regurgitation is present.  No aortic stenosis is present.     Pulmonic Valve  The pulmonic valve is not well visualized.     Vessels  The aortic root is normal size. Normal size ascending aorta. Dilation of the  inferior vena cava is present with abnormal respiratory variation in diameter.     Pericardium  There is no pericardial effusion.     Rhythm  Sinus rhythm was noted.  ______________________________________________________________________________  MMode/2D Measurements & Calculations  IVSd: 1.1 cm     LVIDd: 5.5 cm  LVIDs: 4.6 cm  LVPWd: 1.00 cm  FS: 16.1 %  LV mass(C)d: 227.9 grams  LV mass(C)dI: 126.5 grams/m2  LA dimension: 5.2 cm  LA Volume (BP): 83.5 ml  LA Volume Index (BP): 46.4 ml/m2  RV Base: 3.8 cm  RWT: 0.36  TAPSE: 2.6 cm     Doppler Measurements & Calculations  MV E max bright: 84.4 cm/sec  MV A max bright: 71.1 cm/sec  MV E/A: 1.2  MV max P.3 mmHg  MV mean P.6 mmHg  MV V2 VTI: 21.3 cm  MV dec slope: 626.7 cm/sec2  MV dec time: 0.13 sec  PA acc time: 0.08 sec  TR max bright: 343.1 cm/sec  TR max P.1 mmHg  E/E' av.8  Lateral E/e': 12.1  Medial E/e': 11.4  RV S Bright: 18.2 cm/sec     ______________________________________________________________________________  Report approved by: Dr Dahlia Sandhu on 2024 12:14 PM

## 2024-12-03 NOTE — PROGRESS NOTES
Care Management Follow Up    Length of Stay (days): 2    Expected Discharge Date: 12/05/2024     Concerns to be Addressed: discharge planning, home safety  Patient home alone much of time  Patient plan of care discussed at interdisciplinary rounds: Yes    Anticipated Discharge Disposition: Home Care              Anticipated Discharge Services: Home Care  Anticipated Discharge DME: None    Patient/family educated on Medicare website which has current facility and service quality ratings:    Education Provided on the Discharge Plan: Yes  Patient/Family in Agreement with the Plan: yes    Referrals Placed by CM/SW: Homecare  Private pay costs discussed: Not applicable    Discussed  Partnership in Safe Discharge Planning  document with patient/family: No     Handoff Completed: No, handoff not indicated or clinically appropriate    Additional Information:  SW spoke with patients son Anand and his significant other. SW discussed concerns with long term plans for patient and feeling as if patient may need more support in the home. SW discussed Medicaid and the possibility of patient qualifying which could allow for additional support in the future. SW provided MA application, HCD, POA, Senior Linkage Line, and private duty home care services. Patients son appreciative of resources and plans to take patient home with home care at time of discharge.     Next Steps: Discharge home with home care once medically ready.     MONSTER Cavazos    [facility]

## 2024-12-03 NOTE — PROGRESS NOTES
[facility]    Cardiology Progress Note    Primary Cardiologist: Dr. Mccurdy, [facility]     Date of Admission: 12/1/2024  Service Date: 12/03/24    Summary:  Ms. Grace Jorgensen is a very pleasant 74 year old female with a past medical history of type 2 diabetes, paroxysmal atrial fibrillation, HFpEF, cerebellar ataxia, gastroparesis who was admitted on 12/1/2024 for nausea and vomiting. Cardiology was consulted for elevated troponin.    Interval History   Patient nauseated overnight and this morning. Denies any chest pain or shortness of breath. Still hypertensive this morning,  however receives all her BP meds in the afternoon.     Telemetry: Sinus rhythm, rate 80's    Assessment & Plan   1.  Elevated troponin, likely type II MI due to delta change in troponin and EKG findings  -No anginal symptoms or shortness of breath  -EKG with nonspecific ST to ST changes    2.  Asymptomatic severe coronary calcification on chest CT  - Recommend aggressive risk factor modification    3.  Paroxysmal atrial fibrillation with previous CVA, [id number] (age, female,  HTN, CVA)  -PTA anticoagulated with warfarin, metoprolol added this admission for rate control and hypertension     4.  Gastroparesis with previous neurostimulator insertion  - On reglan, protonix, and carafate      Plan:   1. Will follow and review echocardiogram results when available   2. Recommend resuming PTA atorvastatin at discharge  3. Anticoagulation with warfarin per pharmacy  4. Will monitor blood pressure, consider increase in metoprolol   5. Cardiology will continue to follow     Thank you for the opportunity to participate in this pleasant patient's care.     SALOMÓN Alberto, CNP   Nurse Practitioner  [facility]  (8am - 5pm, M-F)    Patient Active Problem List   Diagnosis    Cerebellar ataxia (H)    Cerebral infarction (H)    Chronic heart failure with preserved ejection fraction (H)     Embolic stroke (H)    Gastroesophageal reflux disease    Paroxysmal atrial fibrillation (H)    Type 2 diabetes mellitus (H)    Tobacco use disorder    S/P mastectomy    Vitamin D deficiency    Restless leg syndrome    Long term (current) use of anticoagulants    Pain in joint, shoulder region    Iron deficiency anemia    Hyponatremia    Hypertension    Hyperlipidemia    Humerus head fracture    History of humerus fracture    Hematemesis    Healthcare maintenance    Gastroparesis    Hallux valgus (acquired), right foot    Dyslipidemia    Dizziness and giddiness    Depression    Constipation    Acquired hammer toes of both feet    Anemia, unspecified    Anxiety state    Temporal arteritis (H)    Acute intractable headache, unspecified headache type    Gastrointestinal hemorrhage with melena    Anemia, unspecified type    Dehydration    Generalized weakness    Elevated lactic acid level       Physical Exam   Temp: 99  F (37.2  C) Temp src: Tympanic BP: (!) 179/99 Pulse: 105   Resp: 18 SpO2: 97 % O2 Device: None (Room air) Oxygen Delivery: 2 LPM  Vitals:    12/02/24 0900   Weight: 79 kg (174 lb 2.6 oz)     Vital Signs with Ranges  Temp:  [97.6  F (36.4  C)-99.1  F (37.3  C)] 99  F (37.2  C)  Pulse:  [] 105  Resp:  [18] 18  BP: (146-179)/() 179/99  SpO2:  [92 %-97 %] 97 %  I/O last 3 completed shifts:  In: 0   Out: 1212 [Urine:1212]    Constitutional:  Appears her stated age, well nourished, nauseated  Eyes: Pupils equal, round. Sclerae anicteric.   HEENT: Normocephalic, atraumatic.   Neck: Supple. No JVD appreciated.  Respiratory: Breathing non-labored. Lungs clear to auscultation bilaterally. No crackles, wheezes, rhonchi, or rales.  Cardiovascular: Regular rate and rhythm, normal S1 and S2. No murmur, rub, or gallop.  GI: Soft, non-distended  Skin: Warm, dry.   Musculoskeletal/Extremities: Moves all extremities well and symmetrically. No edema.  Neurologic: No gross focal deficits. Alert, awake, and  oriented to person, place and time.  Psychiatric: Affect appropriate. Mentation normal.    Medications   Current Facility-Administered Medications   Medication Dose Route Frequency Provider Last Rate Last Admin    lactated ringers infusion   Intravenous Continuous Jacy Moses MD 75 mL/hr at 12/02/24 1439 New Bag at 12/02/24 1439    Patient is already receiving anticoagulation with heparin, enoxaparin (LOVENOX), warfarin (COUMADIN)  or other anticoagulant medication   Does not apply Continuous PRN Donnie Bae MD         Current Facility-Administered Medications   Medication Dose Route Frequency Provider Last Rate Last Admin    amLODIPine (NORVASC) tablet 5 mg  5 mg Oral Daily Jacy Moses MD   5 mg at 12/03/24 0838    citalopram (celeXA) tablet 40 mg  40 mg Oral Daily Jacy Moses MD   40 mg at 12/03/24 0837    gabapentin (NEURONTIN) capsule 200 mg  200 mg Oral TID Jacy Moses MD   200 mg at 12/03/24 0838    lisinopril (ZESTRIL) tablet 40 mg  40 mg Oral Daily Jacy Moses MD   40 mg at 12/03/24 0837    metoclopramide (REGLAN) injection 5 mg  5 mg Intravenous TID Jacy Moses MD   5 mg at 12/03/24 0838    metoprolol tartrate (LOPRESSOR) tablet 25 mg  25 mg Oral BID Sandeep Russell MD   25 mg at 12/03/24 0833    pantoprazole (PROTONIX) IV push injection 40 mg  40 mg Intravenous Daily with breakfast Jacy Moses MD   40 mg at 12/03/24 0838    sucralfate (CARAFATE) tablet 1 g  1 g Oral 4x Daily Jacy Moses MD   1 g at 12/03/24 0838    Warfarin Dose Required Daily - Pharmacist Managed  1 each Oral See Admin Instructions Donnie Bae MD           Data   No results found for this or any previous visit (from the past 24 hours).    Recent Labs   Lab 12/02/24 0534 12/01/24 1956   WBC 12.4* 12.3*   HGB 11.2* 12.6   HCT 33.4* 36.6   MCV 82 82    478*     Recent Labs   Lab 12/02/24 0534 12/01/24 1956 12/01/24 1941   * 128*  --    POTASSIUM 3.7 4.3  --    CHLORIDE  "98 89*  --    CO2 19* 18*  --    ANIONGAP 13 21*  --    * 171* 174*   BUN 17.1 16.2  --    CR 0.79 0.90  --    GFRESTIMATED 78 66  --    SERA 8.7* 9.5  --      No results for input(s): \"NTBNPI\", \"NTBNP\" in the last 168 hours.  No results for input(s): \"TROPONIN\", \"TROPI\", \"TROPR\", \"TROPONINIS\" in the last 168 hours.    Invalid input(s): \"TROPT\", \"TROP\", \"TROPONINIES\", \"TNIH\"     This note was completed in part using Dragon voice recognition software. Although reviewed after completion, some word and grammatical errors may occur.   "

## 2024-12-03 NOTE — PLAN OF CARE
Goal Outcome Evaluation:  Alert and oriented X2, VSS excpt for elevated BP. Clear liquid diet. No Bm during he shift and stool sample need to be collected. LR infusing 75 mL/hr.  purewick  in place with adequate output. Confused and pulled out iv.  Complain of nausea this mooring. Denies pain. Discharge peinding.

## 2024-12-03 NOTE — PLAN OF CARE
Goal Outcome Evaluation:                      Pt A/Ox2-3. Vss ex tachy NC @ 2L.  . Stool sample needs to be collected no BM this shift. Clear liquid diet.Poor appetite.BP running high MD paged started home medication. Pt is nauseated all day.GI consulted. Cardiology saw the pt.Not out of bed . Pt pulled IV line in the evening .Vascular consult done,

## 2024-12-03 NOTE — PROGRESS NOTES
[facility]  OUTPATIENT PHYSICAL THERAPY EVALUATION  PLAN OF TREATMENT FOR OUTPATIENT REHABILITATION  (COMPLETE FOR INITIAL CLAIMS ONLY)  Patient's Last Name, First Name, M.I.  YOB: 1950  JameelGrace  BRANDON                        Provider's Name  [facility] Medical Record No.  [id number]                             Onset Date:  12/01/24   Start of Care Date:  12/01/24   Type:     _X_PT   ___OT   ___SLP Medical Diagnosis:  nausea/vomiting, dehydration, hyponatremia, Troponinemia              PT Diagnosis:  impaired functional mobility Visits from SOC:  1     See note for plan of treatment, functional goals and certification details    I CERTIFY THE NEED FOR THESE SERVICES FURNISHED UNDER        THIS PLAN OF TREATMENT AND WHILE UNDER MY CARE     (Physician co-signature of this document indicates review and certification of the therapy plan).                 12/03/24 0858   Appointment Info   Signing Clinician's Name / Credentials (PT) Marcelo Moya DPT   Quick Adds   Quick Adds [facility] Auth & Certification   Living Environment   People in Home child(george), adult   Current Living Arrangements house   Home Accessibility   (unsure)   Living Environment Comments limited information from pt, she is confused (per chart, some of this is normal from baseline). Per chart review, pt lives in house with son, Anand, all needs met on main level. 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 Pt uses 4WW for some ambulation, not always needing walker, mostly IND with ADLs.   Self-Care   Usual Activity Tolerance moderate   Current Activity Tolerance poor   Equipment Currently Used at Home walker, rolling   Activity/Exercise/Self-Care Comment Per pt report she uses 4WW for ambulation. Pt asking for son during session.   General Information   Onset of Illness/Injury or Date of Surgery 12/01/24   Referring Physician Jacy Moses MD   Pertinent History of  "Current Problem (include personal factors and/or comorbidities that impact the POC) Pt is 75 yo female who, per chart, \"hx of history of type 2 diabetes mellitus, paroxysmal A-fib on warfarin, HFpEF, cerebellar ataxia, gastroparesis s/p gastric neurostimulator placement.  Patient presents to the ER with son due to complaints of nausea and vomiting.   On arrival to the ER, vitals notable for tachycardia.  Initial labs notable for hyponatremia, lactic acid of 5.3, mild leukocytosis.  She received 2 L of normal saline bolus with improvement of lactic acid to 2.6.  She also did have troponin elevation of 239.  Urinalysis obtained was noninfectious.  SARS-CoV-2, influenza and RSV negative.  CT chest abdomen pelvis obtained without any acute finding. \"   Existing Precautions/Restrictions fall   Cognition   Affect/Mental Status (Cognition) confused   Orientation Status (Cognition) oriented to;person   Pain Assessment   Patient Currently in Pain   (abdominal discomfort, nauea throughout session.)   Integumentary/Edema   Integumentary/Edema Comments no edema, some tenderness with palpation of lower legs and calves.   Posture    Posture Forward head position   Range of Motion (ROM)   Range of Motion ROM is WFL   Strength (Manual Muscle Testing)   Strength (Manual Muscle Testing) Able to perform R SLR;Able to perform L SLR;Deficits observed during functional mobility   Bed Mobility   Comment, (Bed Mobility) supine > sit with Neo, from elevated HOB.   Transfers   Comment, (Transfers) STS with FWW and Neo. Pushing up from walker.   Gait/Stairs (Locomotion)   Comment, (Gait/Stairs) unable to ambulate on this date.   Balance   Balance Comments sitting EOB fair to good, limited assessment for OOB.   Sensory Examination   Sensory Perception patient reports no sensory changes   Clinical Impression   Criteria for Skilled Therapeutic Intervention Yes, treatment indicated   PT Diagnosis (PT) impaired functional mobility   Influenced " by the following impairments pain, nausea, weakness, confusion   Functional limitations due to impairments Difficulty with bed mobility, transfers, and ambulation   Clinical Presentation (PT Evaluation Complexity) stable   Clinical Presentation Rationale clinical judgment   Clinical Decision Making (Complexity) low complexity   Planned Therapy Interventions (PT) balance training;bed mobility training;gait training;home exercise program;ROM (range of motion);strengthening;transfer training;progressive activity/exercise   Risk & Benefits of therapy have been explained evaluation/treatment results reviewed;care plan/treatment goals reviewed;risks/benefits reviewed;current/potential barriers reviewed;participants voiced agreement with care plan;participants included;patient   PT Total Evaluation Time   PT Eval, Low Complexity Minutes (38373) 10   Therapy Certification   Start of care date 12/01/24   Certification date from 12/03/24   Certification date to 12/10/24   Medical Diagnosis nausea/vomiting, dehydration, hyponatremia, Troponinemia   [facility] Authorization Information   Condition type Initial onset (within last 3 months)   Cause of current episode Unspecified   Nature of treatment Rehabilitative   Functional ability Moderate functional limitations   Documented POC (choose all that apply) Measurable short and long term/discharge treatment goals related to physical and functional deficits.   Briefly describe symptoms nausea and pain most limiting factors for mobility on this date.   How did the symptoms start progressively worsening for last couple weeks, unclear etiology (continued medical workup with GI and Cardiology involvement).   Average pain/intensity last 24 hours   (did not rate, pt with cog deficits at baseline and difficulty answering questions or rating pain. Pt reporting discomfort throughout session)   Average pain/intensity past week   (refer to above)   Frequency of Symptoms Frequently (51-75% of the  time)   Symptom impact on ADLs Moderately   Condition change since eval N/A (first visit)   General health reported by patient Fair   Physical Therapy Goals   PT Frequency 5x/week   PT Predicted Duration/Target Date for Goal Attainment 12/10/24   PT Goals Bed Mobility;Transfers;Gait   PT: Bed Mobility Independent;Supine to/from sit   PT: Transfers Modified independent;Sit to/from stand;Assistive device   PT: Gait Supervision/stand-by assist;Rolling walker;150 feet   Interventions   Interventions Quick Adds Therapeutic Activity   Therapeutic Activity   Therapeutic Activities: dynamic activities to improve functional performance Minutes (36885) 18   Symptoms Noted During/After Treatment Fatigue;Dizziness  (nausea)   Treatment Detail/Skilled Intervention Pt supine in bed upon PT arrival. Time for line/tube mgmt and checking vitals. Pt's BP elevated (see VSFS), at end of session SBP at 190. Nurse called and notified. Just prior to session, she gave meds for BP control and stated she will recheck in about 1 hour. Pt sitting EOB with SBA, cued for anterior scooting, performed with CGA. Pt with increased nausea and overall discomfort sitting up, no emesis but dry heaving. Pt performed STS with FWW and Neo, from elevated bed height. Pt cued for lateral stepping towards HOB, FWW and CGA, only ~2 very small steps able to make, then sitting. Pt performed sit > supine with Neo. Pt supine in bed, alarm on, all needs within reach.   PT Discharge Planning   PT Plan progress all mob, assess gait as able, w/c follow for ambulation.   PT Discharge Recommendation (DC Rec) Transitional Care Facility   PT Rationale for DC Rec Pt at baseline lives in house with son, uses 4WW (not consistently) for ambulation, mostly IND with ADLs. Currently, pt is below baseline, limited during this session d/t ongoing nausea and near vomiting. Feeling unwell throughout session and limited to transfer only. Recommending TCF at this time, but pending  ongoing therapy and improvement of symptoms, pt may be able to progress quickly and return home with assistance. Per chart, Son works during the day and pt is typically alone during those hours.   PT Brief overview of current status Ax1 with STS using FWW; Goals of therapy will be to address safe mobility and make recs for d/c to next level of care. Pt and RN will continue to follow all falls risk precautions as documented by RN staff while hospitalized.   Physical Therapy Time and Intention   Timed Code Treatment Minutes 18   Total Session Time (sum of timed and untimed services) 28

## 2024-12-03 NOTE — PROGRESS NOTES
"Care Management Follow Up    Length of Stay (days): 1    Expected Discharge Date: 12/03/2024     Concerns to be Addressed: discharge planning, home safety  Patient home alone much of time  Patient plan of care discussed at interdisciplinary rounds: Yes    Anticipated Discharge Disposition: Home Care              Anticipated Discharge Services: Home Care  Anticipated Discharge DME: None    Patient/family educated on Medicare website which has current facility and service quality ratings:    Education Provided on the Discharge Plan: Yes  Patient/Family in Agreement with the Plan: yes    Referrals Placed by CM/SW: Homecare  Private pay costs discussed: Not applicable    Discussed  Partnership in Safe Discharge Planning  document with patient/family: No     Handoff Completed: No, handoff not indicated or clinically appropriate    Additional Information:  Writer received call from Anand's girlfriend, she is trying to help him navigate care at home vs TCU. He says he is \"overwhelmed\" with insurance, costs, options. They will be in to visit with Grace, writer advised them we can have social work stop in when they arrive. Writer also received call that [facility] can tentatively accept patient for home care, but they would also need home RN order. Updated referral.      Next Steps: final disposition, medical clearance for discharge    Robyn Cheatham, TING Care Coordinator  [facility]  [phone]      "

## 2024-12-04 ENCOUNTER — APPOINTMENT (OUTPATIENT)
Dept: OCCUPATIONAL THERAPY | Facility: CLINIC | Age: 74
DRG: 640 | End: 2024-12-04
Attending: INTERNAL MEDICINE
Payer: COMMERCIAL

## 2024-12-04 VITALS
OXYGEN SATURATION: 96 % | DIASTOLIC BLOOD PRESSURE: 78 MMHG | HEART RATE: 83 BPM | RESPIRATION RATE: 18 BRPM | BODY MASS INDEX: 32.05 KG/M2 | WEIGHT: 174.16 LBS | HEIGHT: 62 IN | TEMPERATURE: 98.9 F | SYSTOLIC BLOOD PRESSURE: 146 MMHG

## 2024-12-04 LAB
ANION GAP SERPL CALCULATED.3IONS-SCNC: 9 MMOL/L (ref 7–15)
BUN SERPL-MCNC: 21.1 MG/DL (ref 8–23)
CALCIUM SERPL-MCNC: 8.5 MG/DL (ref 8.8–10.4)
CHLORIDE SERPL-SCNC: 100 MMOL/L (ref 98–107)
CREAT SERPL-MCNC: 0.68 MG/DL (ref 0.51–0.95)
EGFRCR SERPLBLD CKD-EPI 2021: 90 ML/MIN/1.73M2
ERYTHROCYTE [DISTWIDTH] IN BLOOD BY AUTOMATED COUNT: 16 % (ref 10–15)
GLUCOSE SERPL-MCNC: 110 MG/DL (ref 70–99)
HCO3 SERPL-SCNC: 22 MMOL/L (ref 22–29)
HCT VFR BLD AUTO: 30.8 % (ref 35–47)
HGB BLD-MCNC: 10 G/DL (ref 11.7–15.7)
INR PPP: 3.01 (ref 0.85–1.15)
MCH RBC QN AUTO: 27.5 PG (ref 26.5–33)
MCHC RBC AUTO-ENTMCNC: 32.5 G/DL (ref 31.5–36.5)
MCV RBC AUTO: 85 FL (ref 78–100)
PLATELET # BLD AUTO: 342 10E3/UL (ref 150–450)
POTASSIUM SERPL-SCNC: 3.3 MMOL/L (ref 3.4–5.3)
POTASSIUM SERPL-SCNC: 3.7 MMOL/L (ref 3.4–5.3)
RBC # BLD AUTO: 3.63 10E6/UL (ref 3.8–5.2)
SODIUM SERPL-SCNC: 131 MMOL/L (ref 135–145)
WBC # BLD AUTO: 11.8 10E3/UL (ref 4–11)

## 2024-12-04 PROCEDURE — 97165 OT EVAL LOW COMPLEX 30 MIN: CPT | Mod: GO

## 2024-12-04 PROCEDURE — 85610 PROTHROMBIN TIME: CPT | Performed by: INTERNAL MEDICINE

## 2024-12-04 PROCEDURE — 36415 COLL VENOUS BLD VENIPUNCTURE: CPT | Performed by: INTERNAL MEDICINE

## 2024-12-04 PROCEDURE — 85048 AUTOMATED LEUKOCYTE COUNT: CPT | Performed by: INTERNAL MEDICINE

## 2024-12-04 PROCEDURE — 250N000013 HC RX MED GY IP 250 OP 250 PS 637: Performed by: INTERNAL MEDICINE

## 2024-12-04 PROCEDURE — 250N000009 HC RX 250: Performed by: INTERNAL MEDICINE

## 2024-12-04 PROCEDURE — 84295 ASSAY OF SERUM SODIUM: CPT | Performed by: INTERNAL MEDICINE

## 2024-12-04 PROCEDURE — 250N000011 HC RX IP 250 OP 636: Performed by: INTERNAL MEDICINE

## 2024-12-04 PROCEDURE — 80048 BASIC METABOLIC PNL TOTAL CA: CPT | Performed by: INTERNAL MEDICINE

## 2024-12-04 PROCEDURE — 258N000003 HC RX IP 258 OP 636: Performed by: INTERNAL MEDICINE

## 2024-12-04 PROCEDURE — 84132 ASSAY OF SERUM POTASSIUM: CPT | Performed by: INTERNAL MEDICINE

## 2024-12-04 PROCEDURE — 85014 HEMATOCRIT: CPT | Performed by: INTERNAL MEDICINE

## 2024-12-04 PROCEDURE — 99239 HOSP IP/OBS DSCHRG MGMT >30: CPT | Performed by: INTERNAL MEDICINE

## 2024-12-04 PROCEDURE — 97535 SELF CARE MNGMENT TRAINING: CPT | Mod: GO

## 2024-12-04 RX ORDER — POTASSIUM CHLORIDE 1500 MG/1
40 TABLET, EXTENDED RELEASE ORAL ONCE
Status: COMPLETED | OUTPATIENT
Start: 2024-12-04 | End: 2024-12-04

## 2024-12-04 RX ORDER — METOPROLOL TARTRATE 25 MG/1
25 TABLET, FILM COATED ORAL 2 TIMES DAILY
Qty: 60 TABLET | Refills: 0 | Status: SHIPPED | OUTPATIENT
Start: 2024-12-04 | End: 2025-01-03

## 2024-12-04 RX ORDER — METOCLOPRAMIDE 5 MG/1
5 TABLET ORAL 4 TIMES DAILY PRN
Qty: 20 TABLET | Refills: 0 | Status: SHIPPED | OUTPATIENT
Start: 2024-12-04

## 2024-12-04 RX ORDER — WARFARIN SODIUM 5 MG/1
5 TABLET ORAL
Status: DISCONTINUED | OUTPATIENT
Start: 2024-12-04 | End: 2024-12-04 | Stop reason: HOSPADM

## 2024-12-04 RX ADMIN — METOCLOPRAMIDE 5 MG: 5 INJECTION, SOLUTION INTRAMUSCULAR; INTRAVENOUS at 08:11

## 2024-12-04 RX ADMIN — SODIUM CHLORIDE, POTASSIUM CHLORIDE, SODIUM LACTATE AND CALCIUM CHLORIDE: 600; 310; 30; 20 INJECTION, SOLUTION INTRAVENOUS at 01:24

## 2024-12-04 RX ADMIN — SUCRALFATE 1 G: 1 TABLET ORAL at 08:11

## 2024-12-04 RX ADMIN — SUCRALFATE 1 G: 1 TABLET ORAL at 13:26

## 2024-12-04 RX ADMIN — GABAPENTIN 200 MG: 100 CAPSULE ORAL at 08:11

## 2024-12-04 RX ADMIN — GABAPENTIN 200 MG: 100 CAPSULE ORAL at 15:29

## 2024-12-04 RX ADMIN — LISINOPRIL 40 MG: 40 TABLET ORAL at 08:11

## 2024-12-04 RX ADMIN — AMLODIPINE BESYLATE 5 MG: 5 TABLET ORAL at 08:11

## 2024-12-04 RX ADMIN — CITALOPRAM HYDROBROMIDE 40 MG: 20 TABLET ORAL at 08:11

## 2024-12-04 RX ADMIN — METOPROLOL TARTRATE 25 MG: 25 TABLET, FILM COATED ORAL at 08:11

## 2024-12-04 RX ADMIN — PANTOPRAZOLE SODIUM 40 MG: 40 INJECTION, POWDER, FOR SOLUTION INTRAVENOUS at 08:11

## 2024-12-04 RX ADMIN — METOCLOPRAMIDE 5 MG: 5 INJECTION, SOLUTION INTRAMUSCULAR; INTRAVENOUS at 15:29

## 2024-12-04 RX ADMIN — POTASSIUM CHLORIDE 40 MEQ: 1500 TABLET, EXTENDED RELEASE ORAL at 10:33

## 2024-12-04 ASSESSMENT — ACTIVITIES OF DAILY LIVING (ADL)
ADLS_ACUITY_SCORE: 62
ADLS_ACUITY_SCORE: 62
PREVIOUS_RESPONSIBILITIES: MEDICATION MANAGEMENT;FINANCES
ADLS_ACUITY_SCORE: 66
ADLS_ACUITY_SCORE: 62
ADLS_ACUITY_SCORE: 62
ADLS_ACUITY_SCORE: 66
ADLS_ACUITY_SCORE: 62
ADLS_ACUITY_SCORE: 66
ADLS_ACUITY_SCORE: 62
ADLS_ACUITY_SCORE: 62
ADLS_ACUITY_SCORE: 66
ADLS_ACUITY_SCORE: 62

## 2024-12-04 NOTE — PROGRESS NOTES
[facility]  Gastroenterology Progress Note     rGace Jorgensen MRN# [id number]   YOB: 1950 Age: 74 year old          Assessment and Plan:     Grace Jorgensen is a 74 year old female who has a PMHx of type 2 diabetes mellitus, paroxysmal A-fib on warfarin, HFpEF, cerebellar ataxia, gastroparesis s/p gastric neurostimulator placement.  Patient presented to the ER with son due to complaints of nausea and vomiting.   Dehydration  Gastroparesis  Nausea and vomiting  S/p gastric neurostimulator- with battery replacement 3 weeks ago and settings decreased  No known sick contacts  DDx includes gastroenteritis vs exacerbation of gastroparesis     - continue reglan 5 mg TID and can increase to 10 mg if nausea and vomiting continues  - Daily PPI  - regular diet  - gastric neurstimulator was adjusted to off/on 0.1/5 seconds to 1/4 seconds  - Ok to discharge from GI standpoint  - GI will sign off service. Please contact [facility] if any further GI service needed.   - Follow-up with [facility] in 1-2 weeks after discharge            Generalized weakness  Dehydration  Elevated lactic acid level      Interval History:     doing well; no cp, sob, n/v/d, or abd pain. Requesting regular diet and to be discharged              Review of Systems:     C: NEGATIVE for fever, chills, change in weight  E/M: NEGATIVE for ear, mouth and throat problems  R: NEGATIVE for significant cough or SOB  CV: NEGATIVE for chest pain, palpitations or peripheral edema             Medications:   I have reviewed this patient's current medications  Current Facility-Administered Medications   Medication Dose Route Frequency Provider Last Rate Last Admin    amLODIPine (NORVASC) tablet 5 mg  5 mg Oral Daily Jacy Moses MD   5 mg at 12/04/24 0811    citalopram (celeXA) tablet 40 mg  40 mg Oral Daily Jacy Moses MD   40 mg at 12/04/24 0811    gabapentin (NEURONTIN) capsule 200 mg  200 mg Oral TID  Jacy Moses MD   200 mg at 12/04/24 0811    lisinopril (ZESTRIL) tablet 40 mg  40 mg Oral Daily Jacy Moses MD   40 mg at 12/04/24 0811    metoclopramide (REGLAN) injection 5 mg  5 mg Intravenous TID Jacy Moses MD   5 mg at 12/04/24 0811    metoprolol tartrate (LOPRESSOR) tablet 25 mg  25 mg Oral BID Sandeep Russell MD   25 mg at 12/04/24 0811    pantoprazole (PROTONIX) IV push injection 40 mg  40 mg Intravenous Daily with breakfast Jacy Moses MD   40 mg at 12/04/24 0811    potassium chloride topher ER (KLOR-CON M20) CR tablet 40 mEq  40 mEq Oral Once Jacy Moses MD        sucralfate (CARAFATE) tablet 1 g  1 g Oral 4x Daily Jacy Moses MD   1 g at 12/04/24 0811    Warfarin Dose Required Daily - Pharmacist Managed  1 each Oral See Admin Instructions Donnie Bae MD                      Physical Exam:   Vitals were reviewed  Vital Signs with Ranges  Temp:  [98.2  F (36.8  C)-99.5  F (37.5  C)] 98.9  F (37.2  C)  Pulse:  [83-93] 83  Resp:  [18] 18  BP: (131-168)/(72-94) 146/78  SpO2:  [87 %-100 %] 96 %  I/O last 3 completed shifts:  In: 480 [P.O.:480]  Out: 450 [Urine:450]  Constitutional: Alert, oriented to person, place, date, situation.  Cooperative, lying in bed in NAD.   Respiratory:  Lungs CTAB.  No crackles, wheezes, or rhonchi, no labored breathing.  Cardiovascular:  Heart RRR, no MRG, no edema.  GI:  Abdomen soft, NT/ND and with normoactive BS  Skin/Integumen:  Warm, dry, non-diaphoretic.  MSK: CMS x4 intact.             Data:   I reviewed the patient's new clinical lab test results.   Recent Labs   Lab Test 12/04/24  0843 12/03/24  0916 12/02/24  1015 12/02/24  0534   WBC 11.8* 15.0*  --  12.4*   HGB 10.0* 10.9*  --  11.2*   MCV 85 83  --  82    421  --  409   INR 3.01* 2.02* 1.74*  --      Recent Labs   Lab Test 12/04/24  0843 12/02/24  0534 12/01/24 1956   POTASSIUM 3.3* 3.7 4.3   CHLORIDE 100 98 89*   CO2 22 19* 18*   BUN 21.1 17.1 16.2   ANIONGAP 9 13 21*      Recent Labs   Lab Test 12/02/24  0534 12/01/24  2151 12/01/24  1956 01/22/24  1556   ALBUMIN 4.1  --  4.6 3.9   BILITOTAL 0.4  --  0.4 0.2   ALT 20  --  22 19   AST 38  --  34 29   PROTEIN  --  600*  --   --    LIPASE  --   --  20  --        I reviewed the patient's new imaging results.    All laboratory data reviewed  All imaging studies reviewed by me.    Rupinder Rojo PA-C,  12/4/2024  [facility]  Office : [phone]  Cell: [phone] (Dr. Dotson)  Cell: [phone] (Rupinder Rojo PA-C)

## 2024-12-04 NOTE — PROGRESS NOTES
Shift Summary: 0700-1930    Admitting Diagnosis: Dehydration Generalized weakness Elevated lactic acid level     Pt A&Ox2 disoriented to time and situation. VSS on 2L NC. No BM on this shift. Pt on Full liquid diet with poor appetite, endorses nausea and schedule antiemetic given with relief per Pt/ Up x2 GB/ W, Pt declined OT and getting OOB, Pt attepted with PT and only sat at the edge of the bed Infusing LR at 75 mL/h. Voiding via Purewick. Nutrition Services Adult IP, PT/OT, SW. Consulted

## 2024-12-04 NOTE — UTILIZATION REVIEW
Admission Status; Secondary Review Determination   INSURANCE DENIAL  Under the authority of the Utilization Management Committee, the utilization review process indicated a secondary review on the above patient. The review outcome is based on review of the medical records, discussions with staff, and applying clinical experience noted on the date of the review.     (x) Inpatient Status Appropriate - This patient's medical care is consistent with medical management for inpatient care and reasonable inpatient medical practice.     RATIONALE FOR DETERMINATION     75 yo female with PMH of progressive dementia presents to the ED with intractable N/V, as well as, acute toxic encephalopathy in the setting of hyponatremia and elevated lactic acid.  Noted to have mild leukocytosis and tachycardia, in addition to, elevated lactic acid and there was clinical concern for early SEPSIS.  Was given 3L IVF boluses in the ED and continued on IVF on admission as unable to tolerate any po.  H/O chronic gastroparesis with gastric neurostimulater.  GI consulted, scheduled reglan initiated and stimulator setting adjusted with slow improvement in her symptoms and able to advance diet prior to discharge.   At the time of admission with the information available to the attending physician more than 2 nights Hospital complex care was anticipated, based on patient risk of adverse outcome if treated as outpatient and complex care required. Inpatient admission is appropriate based on the Medicare guidelines.   The information on this document is developed by the utilization review team in order for the business office to ensure compliance. This only denotes the appropriateness of proper admission status and does not reflect the quality of care rendered.   The definitions of Inpatient Status and Observation Status used in making the determination above are those provided in the CMS Coverage Manual, Chapter 1 and Chapter 6, section 70.4.      Sincerely,     Kriss Saini, DO  Utilization Review  Physician Advisor

## 2024-12-04 NOTE — CONSULTS
"BRIEF NUTRITION ASSESSMENT      REASON FOR ASSESSMENT:  Grace Jorgensen is a 74 year old female seen by Registered Dietitian for LOS    NUTRITION HISTORY:  Pt typically eats well with good appetite.   Limited PO x4 days due to nausea/vomiting.     CURRENT DIET AND INTAKE:  Diet:  regular diet            Pt is hungry today, and anxious to get back home to her son's cooking. She tolerated ~50% of her breakfast.     ANTHROPOMETRICS:  Height: 5' 2.283\"  Weight:  174 lbs 2.61 oz (79 kg)   Body mass index is 31.57 kg/m .   Weight Status: Obesity Grade I BMI 30-34.9  IBW:  50.6 kg   %IBW: 156%  Weight History: Stable from last October.   Wt Readings from Last 10 Encounters:   12/02/24 79 kg (174 lb 2.6 oz)   01/22/24 81.6 kg (180 lb)   10/21/23 78.3 kg (172 lb 9.6 oz)   11/09/22 79.4 kg (175 lb)   11/06/21 86.2 kg (190 lb)   03/27/18 81.6 kg (180 lb)       LABS:  Labs noted    MALNUTRITION:  Patient does not meet two of the following criteria necessary for diagnosing malnutrition.     % Weight Loss:  None noted  % Intake:  Decreased intake does not meet criteria for malnutrition   Subcutaneous Fat Loss:  None observed  Muscle Loss:  None observed  Fluid Retention:  None noted    NUTRITION INTERVENTION:  Nutrition Diagnosis:  No nutrition diagnosis at this time.    Implementation:  Nutrition Education: Encouraged pt to start with blander food options, until nausea subsides.     FOLLOW UP/MONITORING:   Will re-evaluate in 7 - 10 days, or sooner, if re-consulted.      Raiza Garcia RD, LD  Pager: [phone]  Available on Vocera    "

## 2024-12-04 NOTE — PLAN OF CARE
Goal Outcome Evaluation: [id number]      Plan of Care Reviewed With: patient, child    Outcome Evaluation: Plan is D/C Home with family possible

## 2024-12-04 NOTE — PLAN OF CARE
Goal Outcome Evaluation:      Plan of Care Reviewed With: patient    Shift Summary [id number]    Admitting Diagnosis: Dehydration [E86.0]  Generalized weakness [R53.1]  Elevated lactic acid level [R79.89]     Mental Status:A/Ox2.   Activity/dangle: A2/GB/W, not oob during this shift.   Diet:Full liquid diet.   Pain:denies.   Waterman/Voiding: purewick in place.   Tele/Restraints/Iso:NSR.   02/LDA:VSS on 2L NC. PIV: LR running @ 75ml/hr.   D/C Date: TBD.     Other Info: pure wick out put of 50 ml for the nights. Bladder scan at 202. C/o mild nausea, schedule antiemetic given with relief.
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
## 2024-12-04 NOTE — PROGRESS NOTES
Care Management Discharge Note    Discharge Date: 12/04/2024       Discharge Disposition: Home Care    Discharge Services: Home Care    Discharge DME: None    Discharge Transportation: family or friend will provide    Private pay costs discussed: Not applicable    Does the patient's insurance plan have a 3 day qualifying hospital stay waiver?  No    PAS Confirmation Code:    Patient/family educated on Medicare website which has current facility and service quality ratings:      Education Provided on the Discharge Plan: Yes  Persons Notified of Discharge Plans: Family, bedside RN  Patient/Family in Agreement with the Plan: yes    Handoff Referral Completed: No, handoff not indicated or clinically appropriate    Additional Information:  Writer faxed HHC orders. Patient is to discharge home with family and [facility] services. Bedside RN will review AVS    Robyn Cheatham RN Care Coordinator  [facility]  [phone]

## 2024-12-04 NOTE — DISCHARGE SUMMARY
[facility]    Discharge Summary  Hospitalist    Date of Admission:  12/1/2024  Date of Discharge:  12/4/2024  Discharging Provider: Jacy Moses MD    Discharge Diagnoses   Nausea and vomiting possibly from gastroparesis   dehydration    History of Present Illness   Please review admission history and physical.    Hospital Course   Grace Jorgensen was admitted on 12/1/2024.  The following problems were addressed during her hospitalization:    Active Problems:    Dehydration    Generalized weakness    Elevated lactic acid level  Grace Jorgensen is a 74 year old female hx of history of type 2 diabetes mellitus, paroxysmal A-fib on warfarin, HFpEF, cerebellar ataxia, gastroparesis s/p gastric neurostimulator placement.  Patient presents to the ER with son due to complaints of nausea and vomiting.   On arrival to the ER, vitals notable for tachycardia.  Initial labs notable for hyponatremia, lactic acid of 5.3, mild leukocytosis.  She received 2 L of normal saline bolus with improvement of lactic acid to 2.6.  She also did have troponin elevation of 239.  Urinalysis obtained was noninfectious.  SARS-CoV-2, influenza and RSV negative.  CT chest abdomen pelvis obtained without any acute finding.  Nausea, Vomiting    Dehydration    Elevated lactic acid - improving    Hyponatremia    Troponinemia - likely due to demand ischemia in the setting of dehydration  Acute toxic metabolic encephalopathy given baseline dementia-likely due to above  -Patient is presenting from home with 1 day onset of nausea, vomiting, poor p.o. intake per discussion with patient's son.  Patient was more than usual confused on admission, 3 weeks ago her gastric stimulator battery was changed and the settings were changed as well, and son is concerned whether that is triggering her nausea and vomiting.  -Patient had presented with elevated lactic acid, hyponatremia, elevated troponin, all these improved with  hydration.  -Seen by cardiology team, they are recommending hydration and monitoring, echocardiogram obtained and did not indicate any wall motion abnormalities, no other significant change of symptoms, current plan is to adjust medications, metoprolol was added this admission.  -Diet advanced, so far no sign of infection noted except for elevated white count, this could be from stress, UA does not indicate any UTI, she had CT chest abdomen and pelvis which did not indicate any specific cause of concern.  This could be from gastroenteritis but as per history her symptoms have been slowly going on since the stimulator battery change.  Reglan improved her symptoms and GI team had increased the settings, that also helped with her symptoms, she tolerated diet well on 12/4, will discharge home with home care.     Chronic Medical Issues-majority of her medications could not be verified except for warfarin which has been continued.  # Hypertension -PTA meds has been restarted.  # ?HFpEF-continue gentle hydration for now.  # Paroxysmal P-cic-duhoyhkd to dose warfarin.  INR this admission 2.14   # GERD; Hiatal Hernia-unclear if patient is on Protonix; will order Protonix every morning.  # Diabetes mellitus, type II-A1c today to go 6.7%.    Jacy Moses MD    Significant Results and Procedures       Pending Results     Unresulted Labs Ordered in the Past 30 Days of this Admission       Date and Time Order Name Status Description    12/1/2024  8:06 PM Blood Culture Peripheral Blood Preliminary             Code Status   Full Code       Primary Care Physician   Arnulfo Duffy    Physical Exam   Temp: 98.9  F (37.2  C) Temp src: Oral BP: (!) 146/78 Pulse: 83   Resp: 18 SpO2: 96 % O2 Device: Nasal cannula Oxygen Delivery: 2 LPM  Vitals:    12/02/24 0900   Weight: 79 kg (174 lb 2.6 oz)     Vital Signs with Ranges  Temp:  [98.2  F (36.8  C)-99.5  F (37.5  C)] 98.9  F (37.2  C)  Pulse:  [83-93] 83  Resp:  [18] 18  BP: (131-168)/(72-94)  146/78  SpO2:  [87 %-100 %] 96 %  I/O last 3 completed shifts:  In: 480 [P.O.:480]  Out: 450 [Urine:450]    The patient was examined on the day of discharge.    Discharge Disposition   Discharged to home with home care  Condition at discharge: Stable    Consultations This Hospital Stay   PHARMACY TO DOSE WARFARIN  VASCULAR ACCESS ADULT IP CONSULT  CARE MANAGEMENT / SOCIAL WORK IP CONSULT  VASCULAR ACCESS ADULT IP CONSULT  CARDIOLOGY IP CONSULT  PHYSICAL THERAPY ADULT IP CONSULT  OCCUPATIONAL THERAPY ADULT IP CONSULT  GASTROENTEROLOGY IP CONSULT  GASTROENTEROLOGY IP CONSULT  VASCULAR ACCESS ADULT IP CONSULT  NUTRITION SERVICES ADULT IP CONSULT  CARE MANAGEMENT / SOCIAL WORK IP CONSULT    Time Spent on this Encounter   I, Jacy Moses MD, personally saw the patient today and spent greater than 30 minutes discharging this patient.    Discharge Orders      Home Care Referral      Reason for your hospital stay    Nausea and vomiting     Follow-up and recommended labs and tests     Follow up with primary care provider, Arnulfo Duffy, within 7 days for hospital follow- up.  The following labs/tests are recommended: basic metabolic panel in 1 week.     Activity    Your activity upon discharge: activity as tolerated     Diet    Follow this diet upon discharge: Current Diet:Orders Placed This Encounter      Snacks/Supplements Adult: Magic Cup; With Meals      Snacks/Supplements Adult: Boost Soothe; With Meals      Regular Diet Adult     Discharge Medications   Current Discharge Medication List        START taking these medications    Details   metoclopramide (REGLAN) 5 MG tablet Take 1 tablet (5 mg) by mouth 4 times daily as needed for vomiting.  Qty: 20 tablet, Refills: 0    Associated Diagnoses: Dehydration      metoprolol tartrate (LOPRESSOR) 25 MG tablet Take 1 tablet (25 mg) by mouth 2 times daily.  Qty: 60 tablet, Refills: 0    Associated Diagnoses: Dehydration; Chronic heart failure with preserved ejection fraction (H)            CONTINUE these medications which have NOT CHANGED    Details   albuterol (PROAIR HFA/PROVENTIL HFA/VENTOLIN HFA) 108 (90 Base) MCG/ACT inhaler Inhale 1-2 puffs into the lungs every 4 hours as needed for shortness of breath, wheezing or cough.      amLODIPine (NORVASC) 5 MG tablet Take 5 mg by mouth daily.      Aspirin Effervescent (AVA-SELTZER ORIGINAL PO) Take 1-2 tablets by mouth every 4 hours as needed.      atorvastatin (LIPITOR) 20 MG tablet Take 20 mg by mouth at bedtime      citalopram (CELEXA) 40 MG tablet Take 40 mg by mouth daily      fluticasone (FLONASE) 50 MCG/ACT nasal spray Spray 1 spray into both nostrils daily as needed for rhinitis or allergies.      furosemide (LASIX) 20 MG tablet Take 20 mg by mouth daily.      gabapentin (NEURONTIN) 100 MG capsule Take 200 mg by mouth 3 times daily.      ketoconazole (NIZORAL) 2 % external shampoo Every two weeks prn      lisinopril (ZESTRIL) 40 MG tablet Take 40 mg by mouth daily.      melatonin 1 MG TABS tablet Take 1 mg by mouth at bedtime.      metFORMIN (GLUCOPHAGE) 500 MG tablet Take 500 mg by mouth daily (with breakfast)      multivitamin w/minerals (THERA-VIT-M) tablet Take 1 tablet by mouth daily    Associated Diagnoses: Iron deficiency anemia, unspecified iron deficiency anemia type      pantoprazole (PROTONIX) 20 MG EC tablet Take 20 mg by mouth 2 times daily.      polyethylene glycol (MIRALAX) 17 g packet Take 17 g by mouth 2 times daily as needed for constipation  Qty: 30 packet, Refills: 0    Associated Diagnoses: Gastrointestinal hemorrhage with melena      rizatriptan (MAXALT-MLT) 10 MG ODT Take 10 mg by mouth at onset of headache for migraine.      simethicone (MYLICON) 80 MG chewable tablet Take 80 mg by mouth every 6 hours as needed for flatulence or cramping.      sucralfate (CARAFATE) 1 GM tablet Take 1 g by mouth 4 times daily.      warfarin ANTICOAGULANT (COUMADIN) 5 MG tablet Take by mouth daily. Take 15 mg Wed, Sat  Take 10 mg  all other days           Allergies   Allergies   Allergen Reactions    Bupropion GI Disturbance     Data   Most Recent 3 CBC's:  Recent Labs   Lab Test 12/04/24  0843 12/03/24  0916 12/02/24  0534   WBC 11.8* 15.0* 12.4*   HGB 10.0* 10.9* 11.2*   MCV 85 83 82    421 409      Most Recent 3 BMP's:  Recent Labs   Lab Test 12/04/24  0843 12/02/24  0534 12/01/24 1956   * 130* 128*   POTASSIUM 3.3* 3.7 4.3   CHLORIDE 100 98 89*   CO2 22 19* 18*   BUN 21.1 17.1 16.2   CR 0.68 0.79 0.90   ANIONGAP 9 13 21*   SERA 8.5* 8.7* 9.5   * 150* 171*     Most Recent 2 LFT's:  Recent Labs   Lab Test 12/02/24  0534 12/01/24 1956   AST 38 34   ALT 20 22   ALKPHOS 79 95   BILITOTAL 0.4 0.4     Most Recent INR's and Anticoagulation Dosing History:  Anticoagulation Dose History  More data exists         Latest Ref Rng & Units 1/23/2024 1/24/2024 1/25/2024 12/1/2024 12/2/2024 12/3/2024 12/4/2024   Recent Dosing and Labs   warfarin ANTICOAGULANT (COUMADIN) 5 MG tablet - - - - - 10 mg, $Given 10 mg, $Given -   warfarin ANTICOAGULANT (COUMADIN) 10 MG tablet - - 10 mg, $Given - - - - -   INR 0.85 - 1.15 1.37  - 1.17  2.14  1.74  2.02  3.01       Details                 Most Recent 3 Troponin's:No lab results found.  Most Recent Cholesterol Panel:No lab results found.  Most Recent 6 Bacteria Isolates From Any Culture (See EPIC Reports for Culture Details):No lab results found.  Most Recent TSH, T4 and A1c Labs:  Recent Labs   Lab Test 10/20/23  1642   A1C 6.2*     Results for orders placed or performed during the hospital encounter of 12/01/24   CT Chest/Abdomen/Pelvis w Contrast    Narrative    EXAM: CT CHEST/ABDOMEN/PELVIS W CONTRAST  LOCATION: [facility]  DATE: 12/1/2024    INDICATION: Chest pain, abdominal pain, nausea, elevated lactate.  COMPARISON: Chest CT from 9/22/2023.  TECHNIQUE: CT scan of the chest, abdomen, and pelvis was performed following injection of IV contrast. Multiplanar  reformats were obtained. Dose reduction techniques were used.   CONTRAST: 91 mL Isovue 370.    FINDINGS:   LUNGS AND PLEURA: Emphysematous changes in the lungs. No acute infiltrates or consolidation. Mild dependent atelectasis. Benign calcified granuloma right middle lobe. No pleural effusions.    MEDIASTINUM/AXILLAE: No adenopathy. Normal heart size. No pericardial effusion. Normal caliber thoracic aorta. Esophagus is partially fluid-filled. Small to moderate hiatal hernia. Bilateral breast implants.    CORONARY ARTERY CALCIFICATION: Severe.    HEPATOBILIARY: Mild diffuse fatty infiltration of the liver. No calcified gallstones or biliary dilatation.    PANCREAS: Normal.    SPLEEN: Normal.    ADRENAL GLANDS: Normal.    KIDNEYS/BLADDER: Small benign right renal cyst with no follow-up needed. Kidneys otherwise negative. No hydronephrosis. Bladder is unremarkable.    BOWEL: Bowel is normal in caliber with no evidence for obstruction. Negative for appendicitis. Sigmoid diverticulosis without evidence for diverticulitis. Electronic stimulator device noted in the subcutaneous tissues left lower quadrant with lead tips   extending to the distal stomach.    LYMPH NODES: Normal.    VASCULATURE: Diffuse aortoiliac atheromatous disease. Negative for aneurysm.    PELVIC ORGANS: Normal.    MUSCULOSKELETAL: Left shoulder arthroplasty. Mild/moderate degenerative changes in the spine greatest in the lumbar spine.      Impression    IMPRESSION:  1.  Emphysematous changes in the lungs. No evidence for acute pneumonitis.    2.  Small to moderate hiatal hernia with partially fluid-filled esophagus of uncertain significance. Nothing definite for bowel obstruction and no acute bowel findings elsewhere. Upper GI exam could be performed in further evaluation as clinically   warranted.    3.  Unremarkable electronic stimulator device subcutaneous tissues left lower quadrant with lead tips extending to the distal portion of the  stomach.    4.  Mild diffuse fatty infiltration of the liver.    5.  Sigmoid diverticulosis without evidence for diverticulitis.    6.  Diffuse atheromatous disease including severe coronary artery calcification.   Echocardiogram Complete     Value    LVEF  55-60%    [facility]    [id number]  [id number]  [id number]^JUAN^MARIO     [facility]  Echocardiography Laboratory  [address] [place] [address]     Name: JCARLOS GARCIA  MRN: [id number]  : 1950  Study Date: 2024 11:17 AM  Age: 74 yrs  Gender: Female  Patient Location: \[facility] [place] [facility]\""  Reason For Study: Tachycardia  Ordering Physician: MARIO MARTINEZ  Referring Physician: Arnulfo Duffy  Performed By: Isaiah Troy RDCS     BSA: 1.8 m2  Height: 62 in  Weight: 174 lb  HR: 84  BP: 150/87 mmHg  ______________________________________________________________________________  Procedure  Complete Portable Echo Adult. Definity (NDC #98124-482) given intravenously.  Technically difficult study.  ______________________________________________________________________________  Interpretation Summary     Left ventricular systolic function is normal.  The visual ejection fraction is 55-60%.  No regional wall motion abnormalities.  Normal right ventricular size and systolic function.  No significant valve disease.  Dilated inferior vena cava.  Technically difficult study.     There is no comparison study available.  ______________________________________________________________________________  Left Ventricle  The left ventricle is normal in size. There is normal left ventricular wall  thickness. Left ventricular systolic function is normal. The visual ejection  fraction is 55-60%. Grade I or early diastolic dysfunction. No regional wall  motion abnormalities noted.     Right Ventricle  The right ventricle is not well visualized. The right ventricle is normal in  size and function.     Atria  The left atrium is severely dilated.  Right atrium not well visualized. There  is no atrial shunt seen.     Mitral Valve  The mitral valve leaflets appear normal. There is no evidence of stenosis,  fluttering, or prolapse. There is mild mitral annular calcification. There is  trace mitral regurgitation. There is no mitral valve stenosis. The mean mitral  valve gradient is 1.6 mmHg.     Tricuspid Valve  The tricuspid valve is not well visualized. There is mild (1+) tricuspid  regurgitation. The right ventricular systolic pressure is approximated at 47.1  mmHg plus the right atrial pressure.     Aortic Valve  The aortic valve is not well visualized. No aortic regurgitation is present.  No aortic stenosis is present.     Pulmonic Valve  The pulmonic valve is not well visualized.     Vessels  The aortic root is normal size. Normal size ascending aorta. Dilation of the  inferior vena cava is present with abnormal respiratory variation in diameter.     Pericardium  There is no pericardial effusion.     Rhythm  Sinus rhythm was noted.  ______________________________________________________________________________  MMode/2D Measurements & Calculations  IVSd: 1.1 cm     LVIDd: 5.5 cm  LVIDs: 4.6 cm  LVPWd: 1.00 cm  FS: 16.1 %  LV mass(C)d: 227.9 grams  LV mass(C)dI: 126.5 grams/m2  LA dimension: 5.2 cm  LA Volume (BP): 83.5 ml  LA Volume Index (BP): 46.4 ml/m2  RV Base: 3.8 cm  RWT: 0.36  TAPSE: 2.6 cm     Doppler Measurements & Calculations  MV E max bright: 84.4 cm/sec  MV A max bright: 71.1 cm/sec  MV E/A: 1.2  MV max P.3 mmHg  MV mean P.6 mmHg  MV V2 VTI: 21.3 cm  MV dec slope: 626.7 cm/sec2  MV dec time: 0.13 sec  PA acc time: 0.08 sec  TR max bright: 343.1 cm/sec  TR max P.1 mmHg  E/E' av.8  Lateral E/e': 12.1  Medial E/e': 11.4  RV S Bright: 18.2 cm/sec     ______________________________________________________________________________  Report approved by: Dr Dahlia Sandhu on 2024 12:14 PM

## 2024-12-04 NOTE — PLAN OF CARE
"Physical Therapy Discharge Summary    Reason for therapy discharge:    Discharged to home with home therapy.    Progress towards therapy goal(s). See goals on Care Plan in [facility] electronic health record for goal details.  Goals partially met.  Barriers to achieving goals:   discharge from facility.    Therapy recommendation(s):    Continued therapy is recommended.  Rationale/Recommendations:  per most recent PT note: \"Pt at baseline lives in house with son, uses 4WW (not consistently) for ambulation, mostly IND with ADLs. Currently, pt is below baseline, limited during this session d/t ongoing nausea and near vomiting. Feeling unwell throughout session and limited to transfer only. Recommending TCF at this time, but pending ongoing therapy and improvement of symptoms, pt may be able to progress quickly and return home with assistance. Per chart, Son works during the day and pt is typically alone during those hours.\" .      "

## 2024-12-04 NOTE — PROGRESS NOTES
Pt is A&Ox2 disoriented to time and situation. Denied SOB CP, Nausea. Up SBA with walker. Patient discharge home with family. AVS printed, instructions reviewed with Pt, teach back acknowledgment; copy and medication handed to Pt. Belongings returned to Pt.

## 2024-12-05 LAB — BACTERIA BLD CULT: NORMAL

## 2024-12-05 NOTE — PROGRESS NOTES
12/04/24 0955   Appointment Info   Signing Clinician's Name / Credentials (OT) Doreen Jorgensen, MS, OTR/L   Living Environment   Living Environment Comments Pt is questionable historian. Per chart review, pt lives in house with son, Anand, all needs met on main level. 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 Pt uses 4WW for some ambulation, not always needing walker   Self-Care   Usual Activity Tolerance moderate   Current Activity Tolerance fair   Equipment Currently Used at Home walker, rolling;grab bar, toilet   Fall history within last six months no   Activity/Exercise/Self-Care Comment Pt is questionable historian. Pt reports she completes sponge bathes in the kitchen sink. Does not wear socks or underwear. Is otherwise is independent in self-cares   Instrumental Activities of Daily Living (IADL)   Previous Responsibilities medication management;finances   IADL Comments Son does cooking. Pt does drive. Has assist with laundry and cleaning.   General Information   Onset of Illness/Injury or Date of Surgery 12/01/24   Referring Physician Jacy Moses MD   Patient/Family Therapy Goal Statement (OT) Return home today   Additional Occupational Profile Info/Pertinent History of Current Problem 74 year old female hx of history of type 2 diabetes mellitus, paroxysmal A-fib on warfarin, HFpEF, cerebellar ataxia, gastroparesis s/p gastric neurostimulator placement.  Patient presents to the ER with son due to complaints of nausea and vomiting.   Existing Precautions/Restrictions fall   Cognitive Status Examination   Affect/Mental Status (Cognitive)   (Irritable)   Follows Commands follows one-step commands;over 90% accuracy   Memory Deficit moderate deficit   Attention Deficit moderate deficit   Cognitive Status Comments Pt irritable. Pt with history of cognitive impairment per EMR. Pt scored 23/30 on SLUMS last year- on 10/24/23. Currently pt likely near baseline cognition   Visual Perception   Visual  Impairment/Limitations WFL   Pain Assessment   Patient Currently in Pain No   Strength Comprehensive (MMT)   Comment, General Manual Muscle Testing (MMT) Assessment Generalized weakness   Transfers   Transfers toilet transfer   Toilet Transfer   [place] (Toilet Transfer) supervision   Activities of Daily Living   BADL Assessment/Intervention lower body dressing;toileting   Lower Body Dressing Assessment/Training   [place] (Lower Body Dressing) moderate assist (50% patient effort)   Toileting   [place] (Toileting) supervision   Clinical Impression   Criteria for Skilled Therapeutic Interventions Met (OT) Yes, treatment indicated   OT Diagnosis Decline function   OT Problem List-Impairments impacting ADL activity tolerance impaired;cognition;strength   Assessment of Occupational Performance 3-5 Performance Deficits   Identified Performance Deficits Toileting, grooming, functional mobility, med management, financial management   Planned Therapy Interventions (OT) ADL retraining;home program guidelines;progressive activity/exercise   Clinical Decision Making Complexity (OT) problem focused assessment/low complexity   Risk & Benefits of therapy have been explained evaluation/treatment results reviewed;care plan/treatment goals reviewed;risks/benefits reviewed;current/potential barriers reviewed;participants voiced agreement with care plan;participants included;patient;son   OT Total Evaluation Time   OT Eval, Low Complexity Minutes (67779) 8   OT Goals   Therapy Frequency (OT) Daily   OT Predicted Duration/Target Date for Goal Attainment 12/09/24   OT Goals Hygiene/Grooming;Lower Body Dressing;Toilet Transfer/Toileting;OT Goal 1   OT: Hygiene/Grooming modified independent;while standing   OT: Lower Body Dressing Supervision/stand-by assist  (for pants)   OT: Toilet Transfer/Toileting Modified independent;toilet transfer;cleaning and garment management;using adaptive equipment   OT: Goal 1  "Pt will complete simulated med management task independently   Interventions   Interventions Quick Adds Self-Care/Home Management   Self-Care/Home Management   Self-Care/Home Mgmt/ADL, Compensatory, Meal Prep Minutes (56689) 9   Symptoms Noted During/After Treatment (Meal Preparation/Planning Training) fatigue   Treatment Detail/Skilled Intervention Upon arrival pt agreeable to therapy with encouragement. Pt irritable regarding perfume smell in room, beeping IV, lack of solid food. Pt redirectable. Fan turned on, door opened, pt gown changed. Son arrived for last portion of session. Semi-supine to sit independently. Sit to stand with SBA for balance safety. Pt completed toileting with SBA for balance safety using grab bar. Pt required assist to thread depends- low frustration tolerance- reports she does not wear underwear at home. Stood at sink to wash face with SBA and cue for initiation and thoroughness. Memory impairments noted throughout session. Pt left in recliner with alarm on, son present, provider present.   OT Discharge Planning   OT Plan Grooming standing. Donning pants. Toileting. Monitor Cog. Med setup task   OT Discharge Recommendation (DC Rec) home with assist;home with home care occupational therapy;Leaving home requires significant taxing effort;Leaving home requires significant assistance   OT Rationale for DC Rec Anticipate with continued medical management and therapy participation that pt will progress to discharge home with son. No significant nausea or dizziness with activity in room and bathroom. Cog impairment present. Pt with history of cognitive impairment per EMR. Recommend pt begin to have assist with medication management and financial management. Recommend home OT services for home safety assessment and to reduce her fall risk and progress independence   OT Brief overview of current status \"Goals of therapy will be to address safe mobility and make recs for d/c to next level of care. " "Pt and RN will continue to follow all falls risk precautions as documented by RN staff while hospitalized\"   OT Equipment Needed at Discharge   (Medical alert button)   Total Session Time   Timed Code Treatment Minutes 9   Total Session Time (sum of timed and untimed services) 17       "

## 2024-12-05 NOTE — PLAN OF CARE
Occupational Therapy Discharge Summary    Reason for therapy discharge:    Discharged to home with home therapy.    Progress towards therapy goal(s). See goals on Care Plan in [facility] electronic health record for goal details.  Goals partially met.  Barriers to achieving goals:   discharge from facility and discharge on same date as initial evaluation.    Therapy recommendation(s):    Continued therapy is recommended.  Rationale/Recommendations:  Anticipate with continued medical management and therapy participation that pt will progress to discharge home with son. No significant nausea or dizziness with activity in room and bathroom. Cog impairment present. Pt with history of cognitive impairment per EMR. Recommend pt begin to have assist with medication management and financial management. Recommend home OT services for home safety assessment and to reduce her fall risk and progress independence.

## 2024-12-06 ENCOUNTER — APPOINTMENT (OUTPATIENT)
Dept: CT IMAGING | Facility: CLINIC | Age: 74
DRG: 683 | End: 2024-12-06
Attending: EMERGENCY MEDICINE
Payer: COMMERCIAL

## 2024-12-06 ENCOUNTER — HOSPITAL ENCOUNTER (INPATIENT)
Facility: CLINIC | Age: 74
LOS: 1 days | Discharge: HOME | DRG: 683 | End: 2024-12-10
Attending: EMERGENCY MEDICINE | Admitting: STUDENT IN AN ORGANIZED HEALTH CARE EDUCATION/TRAINING PROGRAM
Payer: COMMERCIAL

## 2024-12-06 ENCOUNTER — APPOINTMENT (OUTPATIENT)
Dept: ULTRASOUND IMAGING | Facility: CLINIC | Age: 74
DRG: 683 | End: 2024-12-06
Attending: SURGERY
Payer: COMMERCIAL

## 2024-12-06 DIAGNOSIS — K92.1 GASTROINTESTINAL HEMORRHAGE WITH MELENA: ICD-10-CM

## 2024-12-06 DIAGNOSIS — Z96.89 PRESENCE OF GASTRIC PACEMAKER: ICD-10-CM

## 2024-12-06 DIAGNOSIS — R10.9 RIGHT SIDED ABDOMINAL PAIN: ICD-10-CM

## 2024-12-06 DIAGNOSIS — I48.20 CHRONIC ATRIAL FIBRILLATION (H): ICD-10-CM

## 2024-12-06 DIAGNOSIS — R79.1 SUPRATHERAPEUTIC INR: ICD-10-CM

## 2024-12-06 DIAGNOSIS — B00.9 HERPES SIMPLEX VIRUS INFECTION: ICD-10-CM

## 2024-12-06 DIAGNOSIS — R93.5 ABNORMAL CT OF THE ABDOMEN: ICD-10-CM

## 2024-12-06 DIAGNOSIS — R53.81 PHYSICAL DECONDITIONING: Primary | ICD-10-CM

## 2024-12-06 LAB
ALBUMIN SERPL BCG-MCNC: 3.9 G/DL (ref 3.5–5.2)
ALBUMIN UR-MCNC: 200 MG/DL
ALP SERPL-CCNC: 72 U/L (ref 40–150)
ALT SERPL W P-5'-P-CCNC: 15 U/L (ref 0–50)
ANION GAP SERPL CALCULATED.3IONS-SCNC: 12 MMOL/L (ref 7–15)
APPEARANCE UR: CLEAR
AST SERPL W P-5'-P-CCNC: 23 U/L (ref 0–45)
BACTERIA BLD CULT: NO GROWTH
BASOPHILS # BLD AUTO: 0 10E3/UL (ref 0–0.2)
BASOPHILS NFR BLD AUTO: 0 %
BILIRUB SERPL-MCNC: 0.3 MG/DL
BILIRUB UR QL STRIP: NEGATIVE
BUN SERPL-MCNC: 14.7 MG/DL (ref 8–23)
CALCIUM SERPL-MCNC: 8.8 MG/DL (ref 8.8–10.4)
CHLORIDE SERPL-SCNC: 97 MMOL/L (ref 98–107)
COLOR UR AUTO: ABNORMAL
CREAT SERPL-MCNC: 0.65 MG/DL (ref 0.51–0.95)
EGFRCR SERPLBLD CKD-EPI 2021: >90 ML/MIN/1.73M2
EOSINOPHIL # BLD AUTO: 0 10E3/UL (ref 0–0.7)
EOSINOPHIL NFR BLD AUTO: 0 %
ERYTHROCYTE [DISTWIDTH] IN BLOOD BY AUTOMATED COUNT: 16.1 % (ref 10–15)
GLUCOSE BLDC GLUCOMTR-MCNC: 146 MG/DL (ref 70–99)
GLUCOSE BLDC GLUCOMTR-MCNC: 99 MG/DL (ref 70–99)
GLUCOSE SERPL-MCNC: 113 MG/DL (ref 70–99)
GLUCOSE UR STRIP-MCNC: 100 MG/DL
HCO3 SERPL-SCNC: 21 MMOL/L (ref 22–29)
HCT VFR BLD AUTO: 33.9 % (ref 35–47)
HGB BLD-MCNC: 11.2 G/DL (ref 11.7–15.7)
HGB UR QL STRIP: ABNORMAL
HOLD SPECIMEN: NORMAL
HOLD SPECIMEN: NORMAL
IMM GRANULOCYTES # BLD: 0 10E3/UL
IMM GRANULOCYTES NFR BLD: 0 %
INR PPP: 5.15 (ref 0.85–1.15)
KETONES UR STRIP-MCNC: 60 MG/DL
LACTATE SERPL-SCNC: 0.9 MMOL/L (ref 0.7–2)
LEUKOCYTE ESTERASE UR QL STRIP: NEGATIVE
LIPASE SERPL-CCNC: 20 U/L (ref 13–60)
LYMPHOCYTES # BLD AUTO: 2 10E3/UL (ref 0.8–5.3)
LYMPHOCYTES NFR BLD AUTO: 32 %
MCH RBC QN AUTO: 27.8 PG (ref 26.5–33)
MCHC RBC AUTO-ENTMCNC: 33 G/DL (ref 31.5–36.5)
MCV RBC AUTO: 84 FL (ref 78–100)
MONOCYTES # BLD AUTO: 0.7 10E3/UL (ref 0–1.3)
MONOCYTES NFR BLD AUTO: 11 %
MUCOUS THREADS #/AREA URNS LPF: PRESENT /LPF
NEUTROPHILS # BLD AUTO: 3.6 10E3/UL (ref 1.6–8.3)
NEUTROPHILS NFR BLD AUTO: 57 %
NITRATE UR QL: NEGATIVE
NRBC # BLD AUTO: 0 10E3/UL
NRBC BLD AUTO-RTO: 0 /100
PH UR STRIP: 7 [PH] (ref 5–7)
PLATELET # BLD AUTO: 405 10E3/UL (ref 150–450)
POTASSIUM SERPL-SCNC: 3.4 MMOL/L (ref 3.4–5.3)
PROT SERPL-MCNC: 7.1 G/DL (ref 6.4–8.3)
RBC # BLD AUTO: 4.03 10E6/UL (ref 3.8–5.2)
RBC URINE: 8 /HPF
SODIUM SERPL-SCNC: 130 MMOL/L (ref 135–145)
SP GR UR STRIP: 1.02 (ref 1–1.03)
TRANSITIONAL EPI: <1 /HPF
UROBILINOGEN UR STRIP-MCNC: NORMAL MG/DL
WBC # BLD AUTO: 6.3 10E3/UL (ref 4–11)
WBC URINE: 1 /HPF

## 2024-12-06 PROCEDURE — G0378 HOSPITAL OBSERVATION PER HR: HCPCS

## 2024-12-06 PROCEDURE — 74177 CT ABD & PELVIS W/CONTRAST: CPT

## 2024-12-06 PROCEDURE — 83690 ASSAY OF LIPASE: CPT | Performed by: EMERGENCY MEDICINE

## 2024-12-06 PROCEDURE — 99221 1ST HOSP IP/OBS SF/LOW 40: CPT | Performed by: SURGERY

## 2024-12-06 PROCEDURE — 36415 COLL VENOUS BLD VENIPUNCTURE: CPT | Performed by: EMERGENCY MEDICINE

## 2024-12-06 PROCEDURE — 250N000011 HC RX IP 250 OP 636: Performed by: EMERGENCY MEDICINE

## 2024-12-06 PROCEDURE — 250N000009 HC RX 250: Performed by: EMERGENCY MEDICINE

## 2024-12-06 PROCEDURE — 250N000009 HC RX 250: Performed by: PHYSICIAN ASSISTANT

## 2024-12-06 PROCEDURE — 250N000011 HC RX IP 250 OP 636: Performed by: PHYSICIAN ASSISTANT

## 2024-12-06 PROCEDURE — 250N000013 HC RX MED GY IP 250 OP 250 PS 637: Performed by: PHYSICIAN ASSISTANT

## 2024-12-06 PROCEDURE — 96361 HYDRATE IV INFUSION ADD-ON: CPT

## 2024-12-06 PROCEDURE — 83605 ASSAY OF LACTIC ACID: CPT | Performed by: EMERGENCY MEDICINE

## 2024-12-06 PROCEDURE — 84460 ALANINE AMINO (ALT) (SGPT): CPT | Performed by: EMERGENCY MEDICINE

## 2024-12-06 PROCEDURE — 85610 PROTHROMBIN TIME: CPT | Performed by: EMERGENCY MEDICINE

## 2024-12-06 PROCEDURE — 99223 1ST HOSP IP/OBS HIGH 75: CPT | Performed by: PHYSICIAN ASSISTANT

## 2024-12-06 PROCEDURE — 258N000003 HC RX IP 258 OP 636: Performed by: EMERGENCY MEDICINE

## 2024-12-06 PROCEDURE — 84155 ASSAY OF PROTEIN SERUM: CPT | Performed by: EMERGENCY MEDICINE

## 2024-12-06 PROCEDURE — 85025 COMPLETE CBC W/AUTO DIFF WBC: CPT | Performed by: EMERGENCY MEDICINE

## 2024-12-06 PROCEDURE — 96376 TX/PRO/DX INJ SAME DRUG ADON: CPT

## 2024-12-06 PROCEDURE — 96374 THER/PROPH/DIAG INJ IV PUSH: CPT

## 2024-12-06 PROCEDURE — 81001 URINALYSIS AUTO W/SCOPE: CPT | Performed by: EMERGENCY MEDICINE

## 2024-12-06 PROCEDURE — 82962 GLUCOSE BLOOD TEST: CPT

## 2024-12-06 PROCEDURE — 76705 ECHO EXAM OF ABDOMEN: CPT

## 2024-12-06 PROCEDURE — 258N000003 HC RX IP 258 OP 636: Performed by: PHYSICIAN ASSISTANT

## 2024-12-06 PROCEDURE — 99285 EMERGENCY DEPT VISIT HI MDM: CPT | Mod: 25

## 2024-12-06 RX ORDER — BISACODYL 10 MG
10 SUPPOSITORY, RECTAL RECTAL DAILY PRN
Status: DISCONTINUED | OUTPATIENT
Start: 2024-12-06 | End: 2024-12-10 | Stop reason: HOSPADM

## 2024-12-06 RX ORDER — HYDROMORPHONE HCL IN WATER/PF 6 MG/30 ML
0.4 PATIENT CONTROLLED ANALGESIA SYRINGE INTRAVENOUS
Status: DISCONTINUED | OUTPATIENT
Start: 2024-12-06 | End: 2024-12-07

## 2024-12-06 RX ORDER — CITALOPRAM HYDROBROMIDE 20 MG/1
40 TABLET ORAL DAILY
Status: DISCONTINUED | OUTPATIENT
Start: 2024-12-06 | End: 2024-12-10 | Stop reason: HOSPADM

## 2024-12-06 RX ORDER — METOCLOPRAMIDE HYDROCHLORIDE 5 MG/ML
5 INJECTION INTRAMUSCULAR; INTRAVENOUS ONCE
Status: COMPLETED | OUTPATIENT
Start: 2024-12-06 | End: 2024-12-06

## 2024-12-06 RX ORDER — GABAPENTIN 100 MG/1
200 CAPSULE ORAL 3 TIMES DAILY
Status: DISCONTINUED | OUTPATIENT
Start: 2024-12-06 | End: 2024-12-10 | Stop reason: HOSPADM

## 2024-12-06 RX ORDER — METOPROLOL TARTRATE 25 MG/1
25 TABLET, FILM COATED ORAL 2 TIMES DAILY
Status: DISCONTINUED | OUTPATIENT
Start: 2024-12-06 | End: 2024-12-10 | Stop reason: HOSPADM

## 2024-12-06 RX ORDER — ALBUTEROL SULFATE 90 UG/1
1-2 INHALANT RESPIRATORY (INHALATION) EVERY 4 HOURS PRN
Status: DISCONTINUED | OUTPATIENT
Start: 2024-12-06 | End: 2024-12-10 | Stop reason: HOSPADM

## 2024-12-06 RX ORDER — METOCLOPRAMIDE 5 MG/1
5 TABLET ORAL 4 TIMES DAILY PRN
Status: DISCONTINUED | OUTPATIENT
Start: 2024-12-06 | End: 2024-12-08

## 2024-12-06 RX ORDER — SUCRALFATE 1 G/1
1 TABLET ORAL 4 TIMES DAILY
Status: DISCONTINUED | OUTPATIENT
Start: 2024-12-06 | End: 2024-12-10 | Stop reason: HOSPADM

## 2024-12-06 RX ORDER — AMLODIPINE BESYLATE 5 MG/1
5 TABLET ORAL DAILY
Status: DISCONTINUED | OUTPATIENT
Start: 2024-12-06 | End: 2024-12-10 | Stop reason: HOSPADM

## 2024-12-06 RX ORDER — HYDROMORPHONE HCL IN WATER/PF 6 MG/30 ML
0.2 PATIENT CONTROLLED ANALGESIA SYRINGE INTRAVENOUS
Status: DISCONTINUED | OUTPATIENT
Start: 2024-12-06 | End: 2024-12-08

## 2024-12-06 RX ORDER — POLYETHYLENE GLYCOL 3350 17 G/17G
17 POWDER, FOR SOLUTION ORAL 2 TIMES DAILY PRN
Status: DISCONTINUED | OUTPATIENT
Start: 2024-12-06 | End: 2024-12-09

## 2024-12-06 RX ORDER — AMOXICILLIN 250 MG
1 CAPSULE ORAL 2 TIMES DAILY PRN
Status: DISCONTINUED | OUTPATIENT
Start: 2024-12-06 | End: 2024-12-10 | Stop reason: HOSPADM

## 2024-12-06 RX ORDER — ACETAMINOPHEN 325 MG/1
650 TABLET ORAL EVERY 4 HOURS PRN
Status: DISCONTINUED | OUTPATIENT
Start: 2024-12-06 | End: 2024-12-07

## 2024-12-06 RX ORDER — SIMETHICONE 80 MG
80 TABLET,CHEWABLE ORAL 4 TIMES DAILY PRN
Status: DISCONTINUED | OUTPATIENT
Start: 2024-12-06 | End: 2024-12-10 | Stop reason: HOSPADM

## 2024-12-06 RX ORDER — ONDANSETRON 4 MG/1
4 TABLET, ORALLY DISINTEGRATING ORAL EVERY 6 HOURS PRN
Status: DISCONTINUED | OUTPATIENT
Start: 2024-12-06 | End: 2024-12-10 | Stop reason: HOSPADM

## 2024-12-06 RX ORDER — HYDROMORPHONE HYDROCHLORIDE 1 MG/ML
0.5 INJECTION, SOLUTION INTRAMUSCULAR; INTRAVENOUS; SUBCUTANEOUS
Status: DISCONTINUED | OUTPATIENT
Start: 2024-12-06 | End: 2024-12-06

## 2024-12-06 RX ORDER — IOPAMIDOL 755 MG/ML
88 INJECTION, SOLUTION INTRAVASCULAR ONCE
Status: COMPLETED | OUTPATIENT
Start: 2024-12-06 | End: 2024-12-06

## 2024-12-06 RX ORDER — PROCHLORPERAZINE MALEATE 5 MG/1
5 TABLET ORAL EVERY 6 HOURS PRN
Status: DISCONTINUED | OUTPATIENT
Start: 2024-12-06 | End: 2024-12-10 | Stop reason: HOSPADM

## 2024-12-06 RX ORDER — ONDANSETRON 2 MG/ML
4 INJECTION INTRAMUSCULAR; INTRAVENOUS EVERY 6 HOURS PRN
Status: DISCONTINUED | OUTPATIENT
Start: 2024-12-06 | End: 2024-12-10 | Stop reason: HOSPADM

## 2024-12-06 RX ORDER — SODIUM CHLORIDE 9 MG/ML
INJECTION, SOLUTION INTRAVENOUS CONTINUOUS
Status: ACTIVE | OUTPATIENT
Start: 2024-12-06 | End: 2024-12-06

## 2024-12-06 RX ORDER — NICOTINE POLACRILEX 4 MG
15-30 LOZENGE BUCCAL
Status: DISCONTINUED | OUTPATIENT
Start: 2024-12-06 | End: 2024-12-10 | Stop reason: HOSPADM

## 2024-12-06 RX ORDER — AMOXICILLIN 250 MG
2 CAPSULE ORAL 2 TIMES DAILY PRN
Status: DISCONTINUED | OUTPATIENT
Start: 2024-12-06 | End: 2024-12-10 | Stop reason: HOSPADM

## 2024-12-06 RX ORDER — OXYCODONE HYDROCHLORIDE 5 MG/1
5 TABLET ORAL EVERY 4 HOURS PRN
Status: DISCONTINUED | OUTPATIENT
Start: 2024-12-06 | End: 2024-12-08

## 2024-12-06 RX ORDER — ACETAMINOPHEN 650 MG/1
650 SUPPOSITORY RECTAL EVERY 4 HOURS PRN
Status: DISCONTINUED | OUTPATIENT
Start: 2024-12-06 | End: 2024-12-07

## 2024-12-06 RX ORDER — DEXTROSE MONOHYDRATE 25 G/50ML
25-50 INJECTION, SOLUTION INTRAVENOUS
Status: DISCONTINUED | OUTPATIENT
Start: 2024-12-06 | End: 2024-12-10 | Stop reason: HOSPADM

## 2024-12-06 RX ADMIN — METOPROLOL TARTRATE 25 MG: 25 TABLET, FILM COATED ORAL at 19:41

## 2024-12-06 RX ADMIN — IOPAMIDOL 88 ML: 755 INJECTION, SOLUTION INTRAVENOUS at 10:07

## 2024-12-06 RX ADMIN — HYDROMORPHONE HYDROCHLORIDE 0.2 MG: 0.2 INJECTION, SOLUTION INTRAMUSCULAR; INTRAVENOUS; SUBCUTANEOUS at 21:24

## 2024-12-06 RX ADMIN — METOCLOPRAMIDE 5 MG: 5 INJECTION, SOLUTION INTRAMUSCULAR; INTRAVENOUS at 10:29

## 2024-12-06 RX ADMIN — GABAPENTIN 200 MG: 100 CAPSULE ORAL at 19:42

## 2024-12-06 RX ADMIN — SUCRALFATE 1 G: 1 TABLET ORAL at 18:04

## 2024-12-06 RX ADMIN — SODIUM CHLORIDE 1000 ML: 9 INJECTION, SOLUTION INTRAVENOUS at 10:28

## 2024-12-06 RX ADMIN — CITALOPRAM HYDROBROMIDE 40 MG: 40 TABLET ORAL at 18:04

## 2024-12-06 RX ADMIN — HYDROMORPHONE HYDROCHLORIDE 0.5 MG: 1 INJECTION, SOLUTION INTRAMUSCULAR; INTRAVENOUS; SUBCUTANEOUS at 12:02

## 2024-12-06 RX ADMIN — PANTOPRAZOLE SODIUM 40 MG: 40 INJECTION, POWDER, FOR SOLUTION INTRAVENOUS at 19:47

## 2024-12-06 RX ADMIN — SODIUM CHLORIDE 65 ML: 9 INJECTION, SOLUTION INTRAVENOUS at 10:07

## 2024-12-06 RX ADMIN — PANTOPRAZOLE SODIUM 40 MG: 40 INJECTION, POWDER, FOR SOLUTION INTRAVENOUS at 14:07

## 2024-12-06 RX ADMIN — SUCRALFATE 1 G: 1 TABLET ORAL at 21:27

## 2024-12-06 RX ADMIN — SODIUM CHLORIDE: 9 INJECTION, SOLUTION INTRAVENOUS at 14:09

## 2024-12-06 RX ADMIN — AMLODIPINE BESYLATE 5 MG: 5 TABLET ORAL at 13:08

## 2024-12-06 RX ADMIN — HYDROMORPHONE HYDROCHLORIDE 0.2 MG: 0.2 INJECTION, SOLUTION INTRAMUSCULAR; INTRAVENOUS; SUBCUTANEOUS at 18:52

## 2024-12-06 ASSESSMENT — ACTIVITIES OF DAILY LIVING (ADL)
ADLS_ACUITY_SCORE: 51
ADLS_ACUITY_SCORE: 57
ADLS_ACUITY_SCORE: 51
ADLS_ACUITY_SCORE: 57
ADLS_ACUITY_SCORE: 51

## 2024-12-06 ASSESSMENT — COLUMBIA-SUICIDE SEVERITY RATING SCALE - C-SSRS
1. IN THE PAST MONTH, HAVE YOU WISHED YOU WERE DEAD OR WISHED YOU COULD GO TO SLEEP AND NOT WAKE UP?: NO
2. HAVE YOU ACTUALLY HAD ANY THOUGHTS OF KILLING YOURSELF IN THE PAST MONTH?: NO
6. HAVE YOU EVER DONE ANYTHING, STARTED TO DO ANYTHING, OR PREPARED TO DO ANYTHING TO END YOUR LIFE?: NO

## 2024-12-06 NOTE — ED NOTES
"Spoke with Nuclear Medicine on \"NM HepatOBiliary Scan\" orders, patient is not prepped for scan since they received IV dilaudid. NM unable to complete exam today. NM reports on call Sunday's 10a-4pm need to be called in for the exam. Prep for exam is No narcotics and no food for 4 hours prior to exam.   Surgeon who ordered exam and hosptialist updated    "

## 2024-12-06 NOTE — ED NOTES
"[facility]  ED Nurse Handoff Report    ED Chief complaint: Abdominal Pain      ED Diagnosis:   Final diagnoses:   Right sided abdominal pain   Abnormal CT of the abdomen   Presence of gastric pacemaker   Supratherapeutic INR       Code Status: To be addressed by admitting MD.     Allergies:   Allergies   Allergen Reactions    Bupropion GI Disturbance       Patient Story: Right sided abdominal pain since discharge from hospital.   Focused Assessment:  Alert and oriented x4, lives with son at baseline, 5/10 RUQ pain, NSR HR in the 80s, HTN, room air. Elevated INR    Spoke with Nuclear Medicine on \"NM HepatOBiliary Scan\" orders, patient is not prepped for scan since they received IV dilaudid. NM unable to complete exam today. NM reports on call Sunday's 10a-4pm need to be called in for the exam. Prep for exam is No narcotics and no food for 4 hours prior to exam.     Treatments and/or interventions provided:   CT Abdomen Pelvis w Contrast   Preliminary Result   IMPRESSION:    1.  Delayed right nephrogram without significant collecting system   dilatation or radiopaque ureteral calculus. Clinically correlate and   correlate with urinalysis for recently passed stone and/or infection.   2.  Nonobstructing right renal calculus.   3.  Cholelithiasis and borderline gallbladder distention. No   additional signs for cholecystitis.   4.  Hepatic steatosis.          Patient's response to treatments and/or interventions: Tolerated well.     To be done/followed up on inpatient unit: Evaluation with hospitalist team.     Does this patient have any cognitive concerns?:  NA     Activity level - Baseline/Home:  Independent  Activity Level - Current:   Stand with assist x2    Patient's Preferred language: English   Needed?: No    Isolation: None  Infection: Not Applicable  Patient tested for COVID 19 prior to admission: NO  Bariatric?: No    Vital Signs:   Vitals:    12/06/24 0900 12/06/24 1000 12/06/24 1055 " 12/06/24 1120   BP: (!) 159/82 (!) 168/77  (!) 179/95   Pulse: 95 75 77 80   Resp: 16 13     Temp: 98.6  F (37  C)      TempSrc: Temporal      SpO2: 96% 93% 95% 98%       Cardiac Rhythm:Cardiac Rhythm: Normal sinus rhythm         Family Comments: Son at bedside in ED  OBS brochure/video discussed/provided to patient/family: NA               Name of person given brochure if not patient: NA               Relationship to patient: NA     For the majority of the shift this patient's behavior was Green.   Behavioral interventions performed were frequent rounding.    ED NURSE PHONE NUMBER: [phone]

## 2024-12-06 NOTE — CONSULTS
"    [facility]    Consult Note - General Surgery Service  Date of Admission:  12/6/2024  Consult Requested by: Kenyatta Parker PA-C  Reason for Consult: cholecystitis    Assessment & Plan: Surgery   Grace Jorgensen is a 74 year old female admitted on 12/6/2024. She may have cholecystitis. It's not entirely clear given her history of gastroparesis. Her CT scan shows a dilated gallbladder, but the patient also hasn't been eating much. There is no stranding and wall does not appear thickened. Will get an ultrasound to better look at the gallbladder anatomy and HIDA scan to confirm cholecystitis.  INR is 5.1 so this will also need to be brought down. Discussed with son who is in agreement of plan.       Drains: None     Code Status: Full Code      Clinically Significant Risk Factors Present on Admission         # Hyponatremia: Lowest Na = 130 mmol/L in last 2 days, will monitor as appropriate  # Hypochloremia: Lowest Cl = 97 mmol/L in last 2 days, will monitor as appropriate       # Drug Induced Coagulation Defect: home medication list includes an anticoagulant medication    # Hypertension: Noted on problem list           # Obesity: Estimated body mass index is 31.57 kg/m  as calculated from the following:    Height as of 12/2/24: 1.582 m (5' 2.28\").    Weight as of 12/2/24: 79 kg (174 lb 2.6 oz).              Vance Davila MD  [facility]  Non-urgent messages: Securely message with [employer] (more info)  Text page via MobileWebsites Paging/Directory     ______________________________________________________________________    Chief Complaint   Abdominal pain    History is obtained from the patient, electronic health record, and patient's children    History of Present Illness   Grace Jorgensen is a 74 year old female who was seen on 12/1 for nausea and vomiting. It was initially thought to be due to her gastroparesis. At that time she also seemed to have some encephalopathy " along with her underlying dementia. Patient also had an Echo on 12/2 which showed LVEF of 50% with early diastolic dysfunction. She was subsequently discharged, but the patient states her nausea and vomiting did not improved. Now she states she has pain as well in her RUQ. She denies ever having pain like this in the past. Only abdominal surgery she recalls is for her gastric stimulator for which the battery was recently changed out. Of note, patient is on Warfarin for a fib.      Past Medical History    Past Medical History:   Diagnosis Date    Cerebral infarction (H)     Depressive disorder     Diabetes (H)     History of blood transfusion     Hypertension        Past Surgical History   Past Surgical History:   Procedure Laterality Date    ABDOMEN SURGERY      ABDOMEN SURGERY      pump in stomach for nausea    BREAST SURGERY      BREAST SURGERY      removed for pain    COLONOSCOPY      COLONOSCOPY N/A 1/24/2024    Procedure: Colonoscopy;  Surgeon: Tyler Dotson MD;  Location: [facility]    ESOPHAGOSCOPY, GASTROSCOPY, DUODENOSCOPY (EGD), COMBINED N/A 1/23/2024    Procedure: Esophagoscopy, gastroscopy, duodenoscopy (EGD), combined;  Surgeon: Tyler Dotson MD;  Location:  GI    ORTHOPEDIC SURGERY      ORTHOPEDIC SURGERY Left     broken  shoulder with janie in humerus       Prior to Admission Medications   (Not in a hospital admission)        Review of Systems    Review of systems not obtained due to patient factors - confusion    Social History   I have reviewed this patient's social history and updated it with pertinent information if needed.  Social History     Tobacco Use    Smoking status: Former     Types: Cigarettes    Smokeless tobacco: Never   Vaping Use    Vaping status: Never Used   Substance Use Topics    Alcohol use: Not Currently    Drug use: Never         Family History     No significant family history      Allergies   Allergies   Allergen Reactions    Bupropion GI Disturbance         Physical Exam   Vital Signs: Temp: 98.6  F (37  C) Temp src: Temporal BP: (!) 174/81 Pulse: 73   Resp: 13 SpO2: 98 % O2 Device: None (Room air)    Weight: 0 lbs 0 ozNo intake or output data in the 24 hours ending 12/06/24 1346  Gen NAD  Neuro: awake, alert, responds to some questions appropriately, but seems to get confused  Abd: soft, nondistended, tender in RUQ; incision on left side of abdomen c/d/i    Medical Decision Making       25 MINUTES SPENT BY ME on the date of service doing chart review, history, exam, documentation & further activities per the note.      Data     I have personally reviewed the following data over the past 24 hrs:    6.3  \   11.2 (L)   / 405     130 (L) 97 (L) 14.7 /  113 (H)   3.4 21 (L) 0.65 \     ALT: 15 AST: 23 AP: 72 TBILI: 0.3   ALB: 3.9 TOT PROTEIN: 7.1 LIPASE: 20     Procal: N/A CRP: N/A Lactic Acid: 0.9       INR:  5.15 (HH) PTT:  N/A   D-dimer:  N/A Fibrinogen:  N/A       Imaging results reviewed over the past 24 hrs: (Imaging reviewed independently by me)  Recent Results (from the past 24 hours)   CT Abdomen Pelvis w Contrast    Narrative    CT ABDOMEN AND PELVIS WITH CONTRAST 12/6/2024 10:16 AM    CLINICAL HISTORY: 2 days right abdomen pain, nausea, no urinary  symptoms.    TECHNIQUE: CT scan of the abdomen and pelvis was performed following  injection of IV contrast. Multiplanar reformats were obtained. Dose  reduction techniques were used.  CONTRAST: 88 mL Isovue 370    COMPARISON: CT chest, abdomen and pelvis 12/1/2024    FINDINGS:   LOWER CHEST: Small to moderate-sized hiatal hernia. Bibasilar  atelectasis.    HEPATOBILIARY: Hepatic steatosis. Probable cholelithiasis. Borderline  gallbladder distention. No surrounding inflammation.    PANCREAS: No significant mass, duct dilatation, or inflammatory  change.    SPLEEN: Unremarkable.    ADRENAL GLANDS: No significant nodules.    KIDNEYS: Nonobstructing right lower pole 3 mm calculus (4/56). Delayed  right nephrogram. No  significant collecting system dilatation or  convincing ureteral calculus.    BOWEL: Diverticulosis in the colon. No acute inflammatory change. No  obstruction.     VASCULATURE: Moderate aortic atherosclerosis.    LYMPH NODE AND PERITONEUM: No enlarged lymph node. No free fluid.    PELVIS: No pelvic mass.    MUSCULOSKELETAL: Similar left abdominal wall gastric stimulator. No  aggressive osseous lesion.    OTHER: None.      Impression    IMPRESSION:   1.  Delayed right nephrogram without significant collecting system  dilatation or radiopaque ureteral calculus. Clinically correlate and  correlate with urinalysis for recently passed stone and/or infection.  2.  Nonobstructing right renal calculus.  3.  Cholelithiasis and borderline gallbladder distention. No  additional signs for cholecystitis.  4.  Hepatic steatosis.    ALEIDA SAINI MD         SYSTEM ID:  [id number]

## 2024-12-06 NOTE — ED PROVIDER NOTES
Emergency Department Note      History of Present Illness     Chief Complaint:  Abdominal pain    HPI   Grace Jorgensen is a 74 year old female with complex history including gastric stimulator for gastroparesis and non-insulin-dependent diabetes as well as Coumadin for paroxysmal atrial fibrillation who presents emergency department with her son for evaluation of abdominal pain that has been building up since she left this hospital 2 days ago after a 3-day admission for vomiting and abdominal pain at which time her gastric stimulator settings were changed and she was prescribed Reglan.  Son is given her dose of Reglan last night with incomplete relief of her nausea and dry heaves.  She had some food today she left the hospital and has had not much to eat or drink since yesterday.  No urinary symptoms.  She is not sure when her last bowel movement was.  She wants all the pain to go away.  Son states she was given some Tylenol in the last 24 hours as well.  No alcohol use.    Independent Historian: Son at bedside who contributes significantly to her history, incorporated above.    Review of External Notes: I personally reviewed prior records including her discharge summary from December 4.  Followed by [facility].    Past Medical History     Medical History and Problem List   Past Medical History:   Diagnosis Date     Cerebral infarction (H)      Depressive disorder      Diabetes (H)      History of blood transfusion      Hypertension        Medications   albuterol (PROAIR HFA/PROVENTIL HFA/VENTOLIN HFA) 108 (90 Base) MCG/ACT inhaler  amLODIPine (NORVASC) 5 MG tablet  Aspirin Effervescent (AVA-SELTZER ORIGINAL PO)  atorvastatin (LIPITOR) 20 MG tablet  citalopram (CELEXA) 40 MG tablet  fluticasone (FLONASE) 50 MCG/ACT nasal spray  furosemide (LASIX) 20 MG tablet  gabapentin (NEURONTIN) 100 MG capsule  ketoconazole (NIZORAL) 2 % external shampoo  lisinopril (ZESTRIL) 40 MG tablet  melatonin 1 MG TABS  tablet  metFORMIN (GLUCOPHAGE) 500 MG tablet  metoclopramide (REGLAN) 5 MG tablet  metoprolol tartrate (LOPRESSOR) 25 MG tablet  multivitamin w/minerals (THERA-VIT-M) tablet  pantoprazole (PROTONIX) 20 MG EC tablet  polyethylene glycol (MIRALAX) 17 g packet  rizatriptan (MAXALT-MLT) 10 MG ODT  simethicone (MYLICON) 80 MG chewable tablet  warfarin ANTICOAGULANT (COUMADIN) 5 MG tablet  sucralfate (CARAFATE) 1 GM tablet      Surgical History   Past Surgical History:   Procedure Laterality Date     ABDOMEN SURGERY       ABDOMEN SURGERY      pump in stomach for nausea     BREAST SURGERY       BREAST SURGERY      removed for pain     COLONOSCOPY       COLONOSCOPY N/A 1/24/2024    Procedure: Colonoscopy;  Surgeon: Tyler Dotson MD;  Location:  GI     ESOPHAGOSCOPY, GASTROSCOPY, DUODENOSCOPY (EGD), COMBINED N/A 1/23/2024    Procedure: Esophagoscopy, gastroscopy, duodenoscopy (EGD), combined;  Surgeon: Tyler Dotson MD;  Location:  GI     ORTHOPEDIC SURGERY       ORTHOPEDIC SURGERY Left     broken  shoulder with janie in humerus     Physical Exam     Patient Vitals for the past 24 hrs:   BP Temp Temp src Pulse Resp SpO2   12/06/24 1329 -- -- -- 73 -- 98 %   12/06/24 1314 (!) 174/81 -- -- 75 -- 96 %   12/06/24 1308 (!) 158/80 -- -- -- -- --   12/06/24 1231 -- -- -- 76 -- 95 %   12/06/24 1230 -- -- -- 78 -- 94 %   12/06/24 1215 (!) 171/83 -- -- 81 -- 94 %   12/06/24 1200 (!) 186/92 -- -- 82 -- 96 %   12/06/24 1120 (!) 179/95 -- -- 80 -- 98 %   12/06/24 1055 -- -- -- 77 -- 95 %   12/06/24 1000 (!) 168/77 -- -- 75 13 93 %   12/06/24 0900 (!) 159/82 98.6  F (37  C) Temporal 95 16 96 %     Physical Exam  General: Chronically ill appearing woman semirecumbent in room 2, son at bedside  HENT: mucous membranes slightly dry  CV: rate as above  Resp: normal effort, speaks in full phrases, no stridor, no cough observed  GI: Abdomen soft, protuberant, mild tenderness primarily the right lower quadrant, left-sided  abdominal implanted device palpable and nontender, negative Mesa sign, no tympany  MSK: no bony tenderness, no CVAT  Skin: appropriately warm and dry, healing incision to left abdomen at the site of battery replacement for her gastric device  Neuro: alert, clear speech, oriented though poor historian  Psych: cooperative with basic cares, patient speaks with son at bedside in tense tones    Diagnostics   Lab Results   Labs Ordered and Resulted from Time of ED Arrival to Time of ED Departure   COMPREHENSIVE METABOLIC PANEL - Abnormal       Result Value    Sodium 130 (*)     Potassium 3.4      Carbon Dioxide (CO2) 21 (*)     Anion Gap 12      Urea Nitrogen 14.7      Creatinine 0.65      GFR Estimate >90      Calcium 8.8      Chloride 97 (*)     Glucose 113 (*)     Alkaline Phosphatase 72      AST 23      ALT 15      Protein Total 7.1      Albumin 3.9      Bilirubin Total 0.3     CBC WITH PLATELETS AND DIFFERENTIAL - Abnormal    WBC Count 6.3      RBC Count 4.03      Hemoglobin 11.2 (*)     Hematocrit 33.9 (*)     MCV 84      MCH 27.8      MCHC 33.0      RDW 16.1 (*)     Platelet Count 405      % Neutrophils 57      % Lymphocytes 32      % Monocytes 11      % Eosinophils 0      % Basophils 0      % Immature Granulocytes 0      NRBCs per 100 WBC 0      Absolute Neutrophils 3.6      Absolute Lymphocytes 2.0      Absolute Monocytes 0.7      Absolute Eosinophils 0.0      Absolute Basophils 0.0      Absolute Immature Granulocytes 0.0      Absolute NRBCs 0.0     ROUTINE UA WITH MICROSCOPIC - Abnormal    Color Urine Light Yellow      Appearance Urine Clear      Glucose Urine 100 (*)     Bilirubin Urine Negative      Ketones Urine 60 (*)     Specific Gravity Urine 1.020      Blood Urine Small (*)     pH Urine 7.0      Protein Albumin Urine 200 (*)     Urobilinogen Urine Normal      Nitrite Urine Negative      Leukocyte Esterase Urine Negative      Mucus Urine Present (*)     RBC Urine 8 (*)     WBC Urine 1      Transitional  Epithelials Urine <1     INR - Abnormal    INR 5.15 (*)    LACTIC ACID WHOLE BLOOD WITH 1X REPEAT IN 2 HR WHEN >2 - Normal    Lactic Acid, Initial 0.9     LIPASE - Normal    Lipase 20       Imaging   CT Abdomen Pelvis w Contrast   Final Result   IMPRESSION:    1.  Delayed right nephrogram without significant collecting system   dilatation or radiopaque ureteral calculus. Clinically correlate and   correlate with urinalysis for recently passed stone and/or infection.   2.  Nonobstructing right renal calculus.   3.  Cholelithiasis and borderline gallbladder distention. No   additional signs for cholecystitis.   4.  Hepatic steatosis.      ALEIDA SAINI MD      ED Course      Medications Administered   Medications   HYDROmorphone (PF) (DILAUDID) injection 0.5 mg (0.5 mg Intravenous $Given 12/6/24 1202)   amLODIPine (NORVASC) tablet 5 mg (5 mg Oral $Given 12/6/24 1308)   metoprolol tartrate (LOPRESSOR) tablet 25 mg (has no administration in time range)   sodium chloride 0.9 % infusion ( Intravenous $New Bag 12/6/24 1409)   pantoprazole (PROTONIX) IV push injection 40 mg (40 mg Intravenous $Given 12/6/24 1407)   sodium chloride 0.9% BOLUS 1,000 mL (1,000 mLs Intravenous $New Bag 12/6/24 1028)   metoclopramide (REGLAN) injection 5 mg (5 mg Intravenous $Given 12/6/24 1029)   iopamidol (ISOVUE-370) solution 88 mL (88 mLs Intravenous $Given 12/6/24 1007)   Saline Flush (65 mLs Intravenous $Given 12/6/24 1007)     ED Course and Discussion of Management   ED Course as of 12/06/24 1422   Fri Dec 06, 2024   1111 I rechecked patient, discussed test results.   1130 I spoke with SREEKANTH Parker, who accepts in collaboration with Dr. Silva.       Additional Documentation  None    Medical Decision Making / Diagnosis   Medical Decision Making:  Patient presents with primarily right-sided abdominal pain in the setting of recent multiday hospitalization for abdominal symptoms, complicated history including diabetes and gastric  pacemaker among others.  She has no peritonitis but given her medical complexity and recent history I felt it was most appropriate to proceed directly to CT imaging which was performed with abnormal results as above.  She has no urinary symptoms and very minimal hematuria, no ureteral stone is seen and I do not think that emergent urologic consultation is indicated.  Abnormal gallbladder findings on CT, this might explain her symptoms though given her supratherapeutic INR and other comorbidities she is not a candidate for immediate surgery, and does not have a Mesa sign at this time, so I think that further workup of these concerns can be reasonably deferred to the hospitalist service as part of his admission or admission for ongoing symptom management as well.  Findings and tentative plan of care discussed with patient who is in agreement with this plan.    Disposition   Admission to Hospitalist    Diagnosis     ICD-10-CM    1. Right sided abdominal pain  R10.9       2. Abnormal CT of the abdomen  R93.5       3. Presence of gastric pacemaker  Z96.89       4. Supratherapeutic INR  R79.1          12/6/2024   MD Kenia Stephens, Anton Morrow MD  12/06/24 2618

## 2024-12-06 NOTE — PROGRESS NOTES
RECEIVING UNIT ED HANDOFF REVIEW    ED Nurse Handoff Report was reviewed by: Mehdi Meza RN on December 6, 2024 at 5:18 PM

## 2024-12-06 NOTE — H&P
[facility]    History and Physical - Hospitalist Service       Date of Admission:  12/6/2024    Assessment & Plan   Grace Jorgensen is a 74 year old female with below PMHx including recent admission from 12/2/24-12/4/24 for dehydration related to progressive N/V after her gastric neurostimulator settings were adjusted 3 weeks prior who re-presented 12/6/24 for further evaluation of R sided abdominal pain that developed on day of discharge and has persisted with associated nausea and general poor oral intake. Registered to observation for further evaluation and treatment.     R sided abdominal pain, intractable nausea and vomiting  Gastroparesis s/p gastric neurostimulator placement (L abdominal wall)  Cholelithiasis and borderline gallbladder distention  GERD  Hiatal hernia, small to moderate:  *3 weeks ago battery and settings changed with progression of sx. Note during last admission, [facility] GI was consulted and pt's stimulator settings were adjusted again, pt was discharged on Reglan.   *WBC 6.3, lactic 0.9, hepatic panel unremarkable. UA not indicative of infection.   *CT with above and below findings. Reviewed CT C/A/P from 12/1/24, at that time note fluid filled esophagus of uncertain significance. No comment of cholelithiasis or GB distention at that time nor comment of renal pathology as below.   *Received Reglan 5 mg IV, 1 L bolus in the ED   - [place] to observation   - General Surgery consulted given RUQ pain with CT findings, ? Symptomatic cholelithiasis. Note INR supratherapeutic 12/6   - Analgesic regimen with PRN tylenol, oxycodone, IV dilaudid   - Antiemetics ordered   - Clear liquid diet   - Continue PPI (IV), carafate, simethicone     ADDENDUM 1400: Notified by RN that NM unable to perform HIDA ordered by General Surgery due to receiving IV narcotics in the ED, will be further delayed as NM not available on the weekend. Discussed with Dr. Davila. Abd US ordered. No  utility in obtaining MRCP at this time. Further plan of care based on surgery review of abd US. Updated son.     Hypochloremic hyponatremia: Sodium 130, Chloride 97.   *S/P 1 L bolus in the ED as above   - mIVF with 0.9% NS at 100 ccs/hr X10 hrs   - BMP in the AM   - Hold PTA Lasix 20 mg po every day PRN  - I&Os, monitor for signs of volume overload     Paroxysmal atrial fibrillation   Chronic anticoagulation with coumadin, supratherapeutic INR: INR 5.15 on admission. No active signs of bleeding. Hgb stable.   - Hold coumadin 12/6, no pharmacy consult placed on admission   - Continue PTA Lopressor BID     Normocytic anemia: Stable Hgb on admission.   - Monitor     Recent Labs   Lab 12/06/24  0922 12/04/24  0843 12/03/24  0916 12/02/24  0534 12/01/24 1956   HGB 11.2* 10.0* 10.9* 11.2* 12.6     Microscopic hematuria   Delayed right nephrogram without significant collecting system dilatation or radiopaque ureteral calculus  Nonobstructing R renal calculus: No flank pain, urinary sx. Not noted on CT from 2 days prior. Note UA from 12/1 was straight cathed, ? Trauma causing the microscopic hematuria as large blood noted at that time.   - Monitor clinically   - Repeat UA outpatient, if microscopic hematuria persists or pt develops flank pain or urinary sx will consult Urology, hold for now     Hepatic steatosis: Noted on admission CT.     HFpEF, not in acute exacerbation  Severe coronary artery calcification per CT 12/1/24  Diffuse aoroiliac atheromatous disease per CT 12/1/24:   *Echo 12/2 with preserved EF, no RWMA, no significant valve disease, grade 1 or early diastolic dysfunciton.     T2DM, non-insulin dependent, controlled: A1C 6.7 last admission.   - Hold metformin   - Medium sliding scale insulin ordered   - BS per protocol   - Monitor for hypoglycemia     Hypertension: Continue PTA Norvasc and Lopressor as above. Hold Lisinopril on admission     Hyperlipidemia: Hold statin     Depression: Continue PTA Celexa  "    Cerebellar ataxia  Hx of CVA: Uses a walker for ambulatory assistance      Mild cognitive impairment: Noted. At baseline mentation per son on admission.          Diet:  Clear liquid diet   DVT Prophylaxis: Warfarin  Waterman Catheter: Not present  Lines: None     Cardiac Monitoring: None  Code Status:  Full Code, confirmed with patient and son at bedside.     Clinically Significant Risk Factors Present on Admission         # Hyponatremia: Lowest Na = 130 mmol/L in last 2 days, will monitor as appropriate  # Hypochloremia: Lowest Cl = 97 mmol/L in last 2 days, will monitor as appropriate       # Drug Induced Coagulation Defect: home medication list includes an anticoagulant medication    # Hypertension: Noted on problem list           # Obesity: Estimated body mass index is 31.57 kg/m  as calculated from the following:    Height as of 12/2/24: 1.582 m (5' 2.28\").    Weight as of 12/2/24: 79 kg (174 lb 2.6 oz).              Disposition Plan     Medically Ready for Discharge: Anticipated in 2-4 Days       The patient's care was discussed with the Attending Physician, Dr. Silva, Bedside Nurse, Patient, and Patient's Family.    Kenyatta Parker PA-C  Hospitalist Service  [facility]  Securely message with [employer] (more info)  Text page via [employer] Paging/Directory     ______________________________________________________________________    Chief Complaint   Abdominal pain    History is obtained from the patient    History of Present Illness   Grace Jorgensen is a 74 year old female with below PMHx including recent admission from 12/2/24-12/4/24 for dehydration related to progressive N/V after her gastric neurostimulator settings were adjusted 3 weeks prior who re-presented 12/6/24 for further evaluation of R sided abdominal pain that developed on day of discharge and has persisted with associated nausea and general poor oral intake. Registered to observation for further " "evaluation and treatment.     In the ED, pt was seen by Dr. Aquino. WBC 6.3, lactic 0.9, hepatic panel unremarkable. UA not indicative of infection. CT with above and below findings. Reviewed CT C/A/P from 12/1/24, at that time note fluid filled esophagus of uncertain significance. No commend of cholelithiasis or GB distention at that time nor comment of renal pathology as below. Received Reglan 5 mg IV, IV dilaudid, 1 L bolus in the ED.     During my interview, pt confirms that her abdominal pain is NEW. It began to develop the day of discharge. Localized to her RUQ. Ongoing pain, nausea and poor oral intake since discharge. No flank pain. No correlation with worsened pain in the postprandial state as pt has not been able to eat much. Increased burping and belching noted as well. Passing some gas, but no BM \"for a while.\" Afebrile.     Past Medical History    Past Medical History:   Diagnosis Date    Cerebral infarction (H)     Depressive disorder     Diabetes (H)     History of blood transfusion     Hypertension        Past Surgical History   Past Surgical History:   Procedure Laterality Date    ABDOMEN SURGERY      ABDOMEN SURGERY      pump in stomach for nausea    BREAST SURGERY      BREAST SURGERY      removed for pain    COLONOSCOPY      COLONOSCOPY N/A 1/24/2024    Procedure: Colonoscopy;  Surgeon: Tyler Dotson MD;  Location:  GI    ESOPHAGOSCOPY, GASTROSCOPY, DUODENOSCOPY (EGD), COMBINED N/A 1/23/2024    Procedure: Esophagoscopy, gastroscopy, duodenoscopy (EGD), combined;  Surgeon: Tyler Dotson MD;  Location:  GI    ORTHOPEDIC SURGERY      ORTHOPEDIC SURGERY Left     broken  shoulder with janie in humerus       Prior to Admission Medications   Prior to Admission Medications   Prescriptions Last Dose Informant Patient Reported? Taking?   Aspirin Effervescent (AVA-SELTZER ORIGINAL PO)  Son Yes No   Sig: Take 1-2 tablets by mouth every 4 hours as needed.   albuterol (PROAIR " HFA/PROVENTIL HFA/VENTOLIN HFA) 108 (90 Base) MCG/ACT inhaler  Son Yes No   Sig: Inhale 1-2 puffs into the lungs every 4 hours as needed for shortness of breath, wheezing or cough.   amLODIPine (NORVASC) 5 MG tablet  Son Yes No   Sig: Take 5 mg by mouth daily.   atorvastatin (LIPITOR) 20 MG tablet  Son Yes No   Sig: Take 20 mg by mouth at bedtime   citalopram (CELEXA) 40 MG tablet  Son Yes No   Sig: Take 40 mg by mouth daily   fluticasone (FLONASE) 50 MCG/ACT nasal spray  Son Yes No   Sig: Spray 1 spray into both nostrils daily as needed for rhinitis or allergies.   furosemide (LASIX) 20 MG tablet  Son Yes No   Sig: Take 20 mg by mouth daily.   gabapentin (NEURONTIN) 100 MG capsule  Son Yes No   Sig: Take 200 mg by mouth 3 times daily.   ketoconazole (NIZORAL) 2 % external shampoo  Son Yes No   Sig: Every two weeks prn   lisinopril (ZESTRIL) 40 MG tablet  Son Yes No   Sig: Take 40 mg by mouth daily.   melatonin 1 MG TABS tablet  Son Yes No   Sig: Take 1 mg by mouth at bedtime.   metFORMIN (GLUCOPHAGE) 500 MG tablet  Son Yes No   Sig: Take 500 mg by mouth daily (with breakfast)   metoclopramide (REGLAN) 5 MG tablet   No No   Sig: Take 1 tablet (5 mg) by mouth 4 times daily as needed for vomiting.   metoprolol tartrate (LOPRESSOR) 25 MG tablet   No No   Sig: Take 1 tablet (25 mg) by mouth 2 times daily.   multivitamin w/minerals (THERA-VIT-M) tablet  Son No No   Sig: Take 1 tablet by mouth daily   pantoprazole (PROTONIX) 20 MG EC tablet  Son Yes No   Sig: Take 20 mg by mouth 2 times daily.   polyethylene glycol (MIRALAX) 17 g packet  Son No No   Sig: Take 17 g by mouth 2 times daily as needed for constipation   rizatriptan (MAXALT-MLT) 10 MG ODT  Son Yes No   Sig: Take 10 mg by mouth at onset of headache for migraine.   simethicone (MYLICON) 80 MG chewable tablet  Son Yes No   Sig: Take 80 mg by mouth every 6 hours as needed for flatulence or cramping.   sucralfate (CARAFATE) 1 GM tablet  Son Yes No   Sig: Take 1 g by  mouth 4 times daily.   warfarin ANTICOAGULANT (COUMADIN) 5 MG tablet  Son Yes No   Sig: Take by mouth daily. Take 15 mg Wed, Sat  Take 10 mg all other days      Facility-Administered Medications: None        Review of Systems    The 10 point Review of Systems is negative other than noted in the HPI.     Social History   I have reviewed this patient's social history and updated it with pertinent information if needed.  Social History     Tobacco Use    Smoking status: Former     Types: Cigarettes    Smokeless tobacco: Never   Vaping Use    Vaping status: Never Used   Substance Use Topics    Alcohol use: Not Currently    Drug use: Never     Family History   Reviewed and noncontributory to current chief complaint.     Allergies   Allergies   Allergen Reactions    Bupropion GI Disturbance        Physical Exam   Vital Signs: Temp: 98.6  F (37  C) Temp src: Temporal BP: (!) 168/77 Pulse: 77   Resp: 13 SpO2: 95 % O2 Device: None (Room air)    Weight: 0 lbs 0 oz    CONSTITUTIONAL: Pt laying in bed, dressed in hospital garb. Appears comfortable. Cooperative with interview. Accompanied by son, at bedside.   HEENT: Normocephalic, atraumatic.   CARDIOVASCULAR: RRR, no murmurs, rubs, or extra heart sounds appreciated. Pulses +2/4 and regular in upper and lower extremities, bilaterally.   RESPIRATORY: No increased work of breathing. CTA, bilat; no wheezes, rales, or rhonchi appreciated.  GASTROINTESTINAL:  Abdomen soft, non-distended. BS auscultated in all four quadrants. TTP RUQ.   MUSCULOSKELETAL: No gross deformities noted. Normal muscle tone.   HEMATOLOGIC/LYMPHATIC/IMMUNOLOGIC: Negative for lower extremity edema, bilaterally.  NEUROLOGIC: Alert and oriented to person, place, and time. No focal neuro deficits.   SKIN: Warm, dry, intact.     Medical Decision Making       75 MINUTES SPENT BY ME on the date of service doing chart review, history, exam, documentation & further activities per the note.      Data     I have  personally reviewed the following data over the past 24 hrs:    6.3  \   11.2 (L)   / 405     130 (L) 97 (L) 14.7 /  113 (H)   3.4 21 (L) 0.65 \     ALT: 15 AST: 23 AP: 72 TBILI: 0.3   ALB: 3.9 TOT PROTEIN: 7.1 LIPASE: 20     Procal: N/A CRP: N/A Lactic Acid: 0.9       INR:  5.15 (HH) PTT:  N/A   D-dimer:  N/A Fibrinogen:  N/A       Imaging results reviewed over the past 24 hrs:   Recent Results (from the past 24 hours)   CT Abdomen Pelvis w Contrast    Narrative    CT ABDOMEN AND PELVIS WITH CONTRAST 12/6/2024 10:16 AM    CLINICAL HISTORY: 2 days right abdomen pain, nausea, no urinary  symptoms.    TECHNIQUE: CT scan of the abdomen and pelvis was performed following  injection of IV contrast. Multiplanar reformats were obtained. Dose  reduction techniques were used.  CONTRAST: 88 mL Isovue 370    COMPARISON: CT chest, abdomen and pelvis 12/1/2024    FINDINGS:   LOWER CHEST: Small to moderate-sized hiatal hernia. Bibasilar  atelectasis.    HEPATOBILIARY: Hepatic steatosis. Probable cholelithiasis. Borderline  gallbladder distention. No surrounding inflammation.    PANCREAS: No significant mass, duct dilatation, or inflammatory  change.    SPLEEN: Unremarkable.    ADRENAL GLANDS: No significant nodules.    KIDNEYS: Nonobstructing right lower pole 3 mm calculus (4/56). Delayed  right nephrogram. No significant collecting system dilatation or  convincing ureteral calculus.    BOWEL: Diverticulosis in the colon. No acute inflammatory change. No  obstruction.     VASCULATURE: Moderate aortic atherosclerosis.    LYMPH NODE AND PERITONEUM: No enlarged lymph node. No free fluid.    PELVIS: No pelvic mass.    MUSCULOSKELETAL: Similar left abdominal wall gastric stimulator. No  aggressive osseous lesion.    OTHER: None.      Impression    IMPRESSION:   1.  Delayed right nephrogram without significant collecting system  dilatation or radiopaque ureteral calculus. Clinically correlate and  correlate with urinalysis for recently  passed stone and/or infection.  2.  Nonobstructing right renal calculus.  3.  Cholelithiasis and borderline gallbladder distention. No  additional signs for cholecystitis.  4.  Hepatic steatosis.    ALEIDA ASINI MD         SYSTEM ID:  [id number]

## 2024-12-06 NOTE — ED TRIAGE NOTES
Pt recently discharged coming back with same abd pain     Triage Assessment (Adult)       Row Name 12/06/24 0856          Triage Assessment    Airway WDL WDL        Respiratory WDL    Respiratory WDL WDL        Skin Circulation/Temperature WDL    Skin Circulation/Temperature WDL WDL        Cardiac WDL    Cardiac WDL WDL        Peripheral/Neurovascular WDL    Peripheral Neurovascular WDL WDL        Cognitive/Neuro/Behavioral WDL    Cognitive/Neuro/Behavioral WDL WDL

## 2024-12-06 NOTE — PHARMACY-ADMISSION MEDICATION HISTORY
Pharmacist Admission Medication History    Admission medication history is complete. The information provided in this note is only as accurate as the sources available at the time of the update.    Information Source(s): Patient, Family member, and CareEverywhere/SureScripts via in-person    Pertinent Information: Med hx obtained from patient and son.   Sucralfate on [facility] and son believes patient should be taking it although it has not been filled recently and he was unable to find a bottle at home.    Warfarin:   Patient is taking warfarin for the indication of afib with an INR goal of 2-3.   Current dosing regimen is 15 mg Wednesdays and Saturdays, 10 mg rest of week.   Last dose (10 mg) was taken 12/5 at evening.    Changes made to PTA medication list:  Added: None  Deleted: None  Changed: furosemide to PRN    Allergies reviewed with patient and updates made in EHR:  son reported patient had some adverse reaction to an inhaler which he could not find the name of. He reported the allergy was in the patient's chart but could not find any allergies besides bupropion in CareEverywhere    Medication History Completed By: Ele Rodríguez RPH 12/6/2024 12:17 PM    PTA Med List   Medication Sig Last Dose/Taking    albuterol (PROAIR HFA/PROVENTIL HFA/VENTOLIN HFA) 108 (90 Base) MCG/ACT inhaler Inhale 1-2 puffs into the lungs every 4 hours as needed for shortness of breath, wheezing or cough. Taking As Needed    amLODIPine (NORVASC) 5 MG tablet Take 5 mg by mouth daily. 12/5/2024    Aspirin Effervescent (AVA-SELTZER ORIGINAL PO) Take 1-2 tablets by mouth every 4 hours as needed. 12/5/2024    atorvastatin (LIPITOR) 20 MG tablet Take 20 mg by mouth at bedtime 12/5/2024    citalopram (CELEXA) 40 MG tablet Take 40 mg by mouth daily 12/5/2024    fluticasone (FLONASE) 50 MCG/ACT nasal spray Spray 1 spray into both nostrils daily as needed for rhinitis or allergies. Taking As Needed    furosemide (LASIX) 20 MG  tablet Take 20 mg by mouth daily as needed (swelling). More than a month    gabapentin (NEURONTIN) 100 MG capsule Take 200 mg by mouth 3 times daily. 12/5/2024    ketoconazole (NIZORAL) 2 % external shampoo Every two weeks prn Taking    lisinopril (ZESTRIL) 40 MG tablet Take 40 mg by mouth daily. 12/5/2024    melatonin 1 MG TABS tablet Take 1 mg by mouth at bedtime. 12/5/2024    metFORMIN (GLUCOPHAGE) 500 MG tablet Take 500 mg by mouth daily (with breakfast) 12/5/2024    metoclopramide (REGLAN) 5 MG tablet Take 1 tablet (5 mg) by mouth 4 times daily as needed for vomiting. 12/5/2024    metoprolol tartrate (LOPRESSOR) 25 MG tablet Take 1 tablet (25 mg) by mouth 2 times daily. 12/5/2024    multivitamin w/minerals (THERA-VIT-M) tablet Take 1 tablet by mouth daily 12/5/2024    pantoprazole (PROTONIX) 20 MG EC tablet Take 20 mg by mouth 2 times daily. 12/5/2024    polyethylene glycol (MIRALAX) 17 g packet Take 17 g by mouth 2 times daily as needed for constipation Taking As Needed    rizatriptan (MAXALT-MLT) 10 MG ODT Take 10 mg by mouth at onset of headache for migraine. Taking As Needed    simethicone (MYLICON) 80 MG chewable tablet Take 80 mg by mouth every 6 hours as needed for flatulence or cramping. Taking As Needed    warfarin ANTICOAGULANT (COUMADIN) 5 MG tablet Take by mouth daily. Take 15 mg Wed, Sat  Take 10 mg all other days 12/5/2024

## 2024-12-07 LAB
ALBUMIN SERPL BCG-MCNC: 3.5 G/DL (ref 3.5–5.2)
ALP SERPL-CCNC: 65 U/L (ref 40–150)
ALT SERPL W P-5'-P-CCNC: 12 U/L (ref 0–50)
ANION GAP SERPL CALCULATED.3IONS-SCNC: 12 MMOL/L (ref 7–15)
AST SERPL W P-5'-P-CCNC: 15 U/L (ref 0–45)
BASOPHILS # BLD AUTO: 0 10E3/UL (ref 0–0.2)
BASOPHILS NFR BLD AUTO: 1 %
BILIRUB SERPL-MCNC: 0.3 MG/DL
BUN SERPL-MCNC: 14 MG/DL (ref 8–23)
CALCIUM SERPL-MCNC: 8.6 MG/DL (ref 8.8–10.4)
CHLORIDE SERPL-SCNC: 100 MMOL/L (ref 98–107)
CREAT SERPL-MCNC: 0.85 MG/DL (ref 0.51–0.95)
EGFRCR SERPLBLD CKD-EPI 2021: 71 ML/MIN/1.73M2
EOSINOPHIL # BLD AUTO: 0.1 10E3/UL (ref 0–0.7)
EOSINOPHIL NFR BLD AUTO: 1 %
ERYTHROCYTE [DISTWIDTH] IN BLOOD BY AUTOMATED COUNT: 16.5 % (ref 10–15)
GLUCOSE BLDC GLUCOMTR-MCNC: 100 MG/DL (ref 70–99)
GLUCOSE BLDC GLUCOMTR-MCNC: 104 MG/DL (ref 70–99)
GLUCOSE BLDC GLUCOMTR-MCNC: 109 MG/DL (ref 70–99)
GLUCOSE BLDC GLUCOMTR-MCNC: 135 MG/DL (ref 70–99)
GLUCOSE BLDC GLUCOMTR-MCNC: 96 MG/DL (ref 70–99)
GLUCOSE SERPL-MCNC: 108 MG/DL (ref 70–99)
HCO3 SERPL-SCNC: 19 MMOL/L (ref 22–29)
HCT VFR BLD AUTO: 35.5 % (ref 35–47)
HGB BLD-MCNC: 11.3 G/DL (ref 11.7–15.7)
IMM GRANULOCYTES # BLD: 0 10E3/UL
IMM GRANULOCYTES NFR BLD: 1 %
INR PPP: 3.92 (ref 0.85–1.15)
LYMPHOCYTES # BLD AUTO: 1.8 10E3/UL (ref 0.8–5.3)
LYMPHOCYTES NFR BLD AUTO: 25 %
MCH RBC QN AUTO: 28 PG (ref 26.5–33)
MCHC RBC AUTO-ENTMCNC: 31.8 G/DL (ref 31.5–36.5)
MCV RBC AUTO: 88 FL (ref 78–100)
MONOCYTES # BLD AUTO: 0.8 10E3/UL (ref 0–1.3)
MONOCYTES NFR BLD AUTO: 10 %
NEUTROPHILS # BLD AUTO: 4.7 10E3/UL (ref 1.6–8.3)
NEUTROPHILS NFR BLD AUTO: 63 %
NRBC # BLD AUTO: 0 10E3/UL
NRBC BLD AUTO-RTO: 0 /100
PLATELET # BLD AUTO: 376 10E3/UL (ref 150–450)
POTASSIUM SERPL-SCNC: 3.2 MMOL/L (ref 3.4–5.3)
POTASSIUM SERPL-SCNC: 3.5 MMOL/L (ref 3.4–5.3)
PROT SERPL-MCNC: 6.5 G/DL (ref 6.4–8.3)
RBC # BLD AUTO: 4.04 10E6/UL (ref 3.8–5.2)
SODIUM SERPL-SCNC: 131 MMOL/L (ref 135–145)
WBC # BLD AUTO: 7.5 10E3/UL (ref 4–11)

## 2024-12-07 PROCEDURE — G0378 HOSPITAL OBSERVATION PER HR: HCPCS

## 2024-12-07 PROCEDURE — 250N000013 HC RX MED GY IP 250 OP 250 PS 637: Performed by: PHYSICIAN ASSISTANT

## 2024-12-07 PROCEDURE — 96372 THER/PROPH/DIAG INJ SC/IM: CPT | Performed by: STUDENT IN AN ORGANIZED HEALTH CARE EDUCATION/TRAINING PROGRAM

## 2024-12-07 PROCEDURE — 99418 PROLNG IP/OBS E/M EA 15 MIN: CPT | Performed by: STUDENT IN AN ORGANIZED HEALTH CARE EDUCATION/TRAINING PROGRAM

## 2024-12-07 PROCEDURE — 85610 PROTHROMBIN TIME: CPT | Performed by: PHYSICIAN ASSISTANT

## 2024-12-07 PROCEDURE — 99233 SBSQ HOSP IP/OBS HIGH 50: CPT | Performed by: STUDENT IN AN ORGANIZED HEALTH CARE EDUCATION/TRAINING PROGRAM

## 2024-12-07 PROCEDURE — 36415 COLL VENOUS BLD VENIPUNCTURE: CPT | Performed by: PHYSICIAN ASSISTANT

## 2024-12-07 PROCEDURE — 82962 GLUCOSE BLOOD TEST: CPT

## 2024-12-07 PROCEDURE — 80053 COMPREHEN METABOLIC PANEL: CPT | Performed by: PHYSICIAN ASSISTANT

## 2024-12-07 PROCEDURE — 99207 PR APP CREDIT; MD BILLING SHARED VISIT: CPT | Performed by: PHYSICIAN ASSISTANT

## 2024-12-07 PROCEDURE — 999N000040 HC STATISTIC CONSULT NO CHARGE VASC ACCESS

## 2024-12-07 PROCEDURE — 250N000011 HC RX IP 250 OP 636: Performed by: STUDENT IN AN ORGANIZED HEALTH CARE EDUCATION/TRAINING PROGRAM

## 2024-12-07 PROCEDURE — 250N000009 HC RX 250: Performed by: INTERNAL MEDICINE

## 2024-12-07 PROCEDURE — 250N000013 HC RX MED GY IP 250 OP 250 PS 637: Performed by: STUDENT IN AN ORGANIZED HEALTH CARE EDUCATION/TRAINING PROGRAM

## 2024-12-07 PROCEDURE — 250N000009 HC RX 250: Performed by: PHYSICIAN ASSISTANT

## 2024-12-07 PROCEDURE — 85025 COMPLETE CBC W/AUTO DIFF WBC: CPT | Performed by: PHYSICIAN ASSISTANT

## 2024-12-07 PROCEDURE — 36415 COLL VENOUS BLD VENIPUNCTURE: CPT | Performed by: STUDENT IN AN ORGANIZED HEALTH CARE EDUCATION/TRAINING PROGRAM

## 2024-12-07 PROCEDURE — 96361 HYDRATE IV INFUSION ADD-ON: CPT

## 2024-12-07 PROCEDURE — 84132 ASSAY OF SERUM POTASSIUM: CPT | Performed by: STUDENT IN AN ORGANIZED HEALTH CARE EDUCATION/TRAINING PROGRAM

## 2024-12-07 PROCEDURE — 96376 TX/PRO/DX INJ SAME DRUG ADON: CPT

## 2024-12-07 RX ORDER — NALOXONE HYDROCHLORIDE 0.4 MG/ML
0.2 INJECTION, SOLUTION INTRAMUSCULAR; INTRAVENOUS; SUBCUTANEOUS
Status: DISCONTINUED | OUTPATIENT
Start: 2024-12-07 | End: 2024-12-10 | Stop reason: HOSPADM

## 2024-12-07 RX ORDER — ACETAMINOPHEN 325 MG/1
975 TABLET ORAL 3 TIMES DAILY
Status: DISCONTINUED | OUTPATIENT
Start: 2024-12-07 | End: 2024-12-10 | Stop reason: HOSPADM

## 2024-12-07 RX ORDER — WATER 10 ML/10ML
INJECTION INTRAMUSCULAR; INTRAVENOUS; SUBCUTANEOUS
Status: COMPLETED
Start: 2024-12-07 | End: 2024-12-07

## 2024-12-07 RX ORDER — NALOXONE HYDROCHLORIDE 0.4 MG/ML
0.4 INJECTION, SOLUTION INTRAMUSCULAR; INTRAVENOUS; SUBCUTANEOUS
Status: DISCONTINUED | OUTPATIENT
Start: 2024-12-07 | End: 2024-12-10 | Stop reason: HOSPADM

## 2024-12-07 RX ORDER — QUETIAPINE FUMARATE 25 MG/1
25 TABLET, FILM COATED ORAL AT BEDTIME
Status: DISCONTINUED | OUTPATIENT
Start: 2024-12-07 | End: 2024-12-10 | Stop reason: HOSPADM

## 2024-12-07 RX ORDER — OLANZAPINE 10 MG/2ML
5 INJECTION, POWDER, FOR SOLUTION INTRAMUSCULAR DAILY PRN
Status: DISCONTINUED | OUTPATIENT
Start: 2024-12-07 | End: 2024-12-10 | Stop reason: HOSPADM

## 2024-12-07 RX ORDER — POTASSIUM CHLORIDE 1500 MG/1
40 TABLET, EXTENDED RELEASE ORAL ONCE
Status: COMPLETED | OUTPATIENT
Start: 2024-12-07 | End: 2024-12-07

## 2024-12-07 RX ADMIN — METOPROLOL TARTRATE 25 MG: 25 TABLET, FILM COATED ORAL at 20:02

## 2024-12-07 RX ADMIN — SUCRALFATE 1 G: 1 TABLET ORAL at 13:12

## 2024-12-07 RX ADMIN — SUCRALFATE 1 G: 1 TABLET ORAL at 09:11

## 2024-12-07 RX ADMIN — GABAPENTIN 200 MG: 100 CAPSULE ORAL at 09:11

## 2024-12-07 RX ADMIN — GABAPENTIN 200 MG: 100 CAPSULE ORAL at 20:02

## 2024-12-07 RX ADMIN — ACETAMINOPHEN 975 MG: 325 TABLET, FILM COATED ORAL at 20:02

## 2024-12-07 RX ADMIN — POTASSIUM CHLORIDE 40 MEQ: 1500 TABLET, EXTENDED RELEASE ORAL at 13:12

## 2024-12-07 RX ADMIN — GABAPENTIN 200 MG: 100 CAPSULE ORAL at 13:12

## 2024-12-07 RX ADMIN — CITALOPRAM HYDROBROMIDE 40 MG: 40 TABLET ORAL at 09:11

## 2024-12-07 RX ADMIN — METOPROLOL TARTRATE 25 MG: 25 TABLET, FILM COATED ORAL at 09:11

## 2024-12-07 RX ADMIN — SUCRALFATE 1 G: 1 TABLET ORAL at 20:02

## 2024-12-07 RX ADMIN — PANTOPRAZOLE SODIUM 40 MG: 40 INJECTION, POWDER, FOR SOLUTION INTRAVENOUS at 09:11

## 2024-12-07 RX ADMIN — OLANZAPINE 5 MG: 10 INJECTION, POWDER, FOR SOLUTION INTRAMUSCULAR at 08:16

## 2024-12-07 RX ADMIN — SIMETHICONE 80 MG: 80 TABLET, CHEWABLE ORAL at 21:34

## 2024-12-07 RX ADMIN — AMLODIPINE BESYLATE 5 MG: 5 TABLET ORAL at 09:11

## 2024-12-07 RX ADMIN — QUETIAPINE FUMARATE 25 MG: 25 TABLET ORAL at 21:32

## 2024-12-07 RX ADMIN — WATER 2.1 ML: 1 INJECTION INTRAMUSCULAR; INTRAVENOUS; SUBCUTANEOUS at 08:16

## 2024-12-07 RX ADMIN — PANTOPRAZOLE SODIUM 40 MG: 40 INJECTION, POWDER, FOR SOLUTION INTRAVENOUS at 20:02

## 2024-12-07 ASSESSMENT — ACTIVITIES OF DAILY LIVING (ADL)
ADLS_ACUITY_SCORE: 61
ADLS_ACUITY_SCORE: 59
ADLS_ACUITY_SCORE: 55
ADLS_ACUITY_SCORE: 59
ADLS_ACUITY_SCORE: 61
ADLS_ACUITY_SCORE: 51
ADLS_ACUITY_SCORE: 61
ADLS_ACUITY_SCORE: 51
ADLS_ACUITY_SCORE: 61
ADLS_ACUITY_SCORE: 61
ADLS_ACUITY_SCORE: 55
ADLS_ACUITY_SCORE: 57
ADLS_ACUITY_SCORE: 57
ADLS_ACUITY_SCORE: 55
ADLS_ACUITY_SCORE: 61
ADLS_ACUITY_SCORE: 57
ADLS_ACUITY_SCORE: 57
ADLS_ACUITY_SCORE: 61
ADLS_ACUITY_SCORE: 61
ADLS_ACUITY_SCORE: 51
ADLS_ACUITY_SCORE: 61
ADLS_ACUITY_SCORE: 61
ADLS_ACUITY_SCORE: 51

## 2024-12-07 NOTE — PLAN OF CARE
Goal Outcome Evaluation:         PRIMARY Concern: R-sided abdominal pain.   SAFETY RISK Concerns (fall risk, behaviors, etc.): Fall risk      Aggression Tool Color: Green  Isolation/Type: N/A  Tests/Procedures for NEXT shift: BG monitoring, AM labs  Consults? (Pending/following, signed-off?) General Surgery, PT, SW/CC consults  Where is patient from? (Home, TCU, etc.): Pt lives w/ son, Anand. She lives in the upstairs of the home and he lives downstairs  Other Important info for NEXT shift: Can be anxious and forgetful, easily reassured w/ reminders of care plan and pain plan. Keep white board updated w/ pain meds  Anticipated DC date & active delays: Pending consults and clinical progress  _____________________________________________________________________________  SUMMARY NOTE:  Orientation/Cognitive: A&Ox4, forgetful.   Observation Goals (Met/ Not Met): NOT MET  Mobility Level/Assist Equipment: Ax1 GB/walker  Antibiotics & Plan (IV/po, length of tx left): N/A  Pain Management: R sided abdominal pain controlled with warm pack.  Tele/VS/O2: VSS on RA. Tele: SR. Hx of a-fib  ABNL Lab/BG: INR 5.15, recheck in AM. Hgb 11.2.NA: 130. .  Diet: Clear liquids  Bowel/Bladder: Continent. No BM  Skin Concerns: Intact.  Drains/Devices: PIV SL'd; purewick in place overnight  Patient Stated Goal for Today: pain control

## 2024-12-07 NOTE — PLAN OF CARE
Goal Outcome Evaluation:         Goal Outcome Evaluation:     PRIMARY Concern: R-sided abdominal pain.   SAFETY RISK Concerns (fall risk, behaviors, etc.): Fall risk      Aggression Tool Color: Green  Isolation/Type: N/A  Tests/Procedures for NEXT shift: BG monitoring ACHS; HIDA scan tomorrow with oncall staff.   Consults? (Pending/following, signed-off?) General Surgery, PT, SW/CC consults  Where is patient from? (Home, TCU, etc.): Pt lives w/ sonAnand. She lives in the upstairs of the home and he lives downstairs  Other Important info for NEXT shift: Son does not want dilaudid given, would like something milder if she complains of pain. Son thinks this contributes to her confusion and agitation. See previous note from this am. Patient care order not to give narcotics 4h before HIDA scan, or will delay.   Anticipated DC date & active delays: Pending consults and clinical progress  _____________________________________________________________________________  SUMMARY NOTE:  Orientation/Cognitive: Disoriented to situation  Observation Goals (Met/ Not Met): NOT MET  Mobility Level/Assist Equipment: Ax1 GB/walker  Antibiotics & Plan (IV/po, length of tx left): N/A  Pain Management: denies  Tele/VS/O2: VSS on RA. Tele: NSR with 1st deg AVB and occ PVCs. Hx of a-fib  ABNL Lab/BG: INR 3.92, trending down  Diet: Clear liquids   Bowel/Bladder: Incont   Skin Concerns: Intact.  Drains/Devices: PIV S/L; purewick   Patient Stated Goal for Today:     Other: can get agitated and aggressive, irritable. SonAnand and girlfriend at bedside most of day. Caregiver stress acknowledged. To reach out to gen surg to see if diet can be lifted from clears in mean time to the test.     Patient care order not to give narcotics 4h before HIDA scan, or will delay.   Anticipated DC date & active delays: Pending consults and

## 2024-12-07 NOTE — PROGRESS NOTES
[facility]  Hospitalist Progress Note    Assessment & Plan   Grace Jorgensen is a 74 year old female with below PMHx including recent admission from 12/2/24-12/4/24 for dehydration related to progressive N/V after her gastric neurostimulator settings were adjusted 3 weeks prior who re-presented 12/6/24 for further evaluation of R sided abdominal pain that developed on day of discharge and has persisted with associated nausea and general poor oral intake. Registered to observation for further evaluation and treatment.      R Sided Abdominal Pain, Intractable Nausea and Vomiting  Gastroparesis s/p Gastric Neuro-stimulator Placement (L abdominal wall)  Cholelithiasis and Borderline Gallbladder Distention  GERD  Hiatal hernia, small to moderate:  Three weeks ago battery and settings changed with progression of sx. Note during last admission, [facility] GI was consulted and pt's stimulator settings were adjusted again, pt was discharged on Reglan.  In the ED, WBC 6.3, lactic 0.9, hepatic panel unremarkable. UA not indicative of infection. CT with Cholelithiasis and borderline gallbladder distention. No  additional signs for cholecystitis. Reviewed CT C/A/P from 12/1/24, at that time note fluid filled esophagus of uncertain significance. No comment of cholelithiasis or GB distention at that time nor comment of renal pathology as below. Received Reglan 5 mg IV, 1 L bolus in the ED     - RUQ US: Distended gallbladder. A 1.4 cm echogenic nonshadowing focus could represent a polyp, sludge ball or nonshadowing stone. No gallbladder wall thickening, pericholecystic fluid or tenderness over the    - HIDA Scan ordered    - Prep for exam: NO food and NO Narcotics 4 hours prior to exam (patient care order placed)     - Analgesic regimen    - Added scheduled Tylenol   - PRN Oxycodone 2.5mg    - Held IV Dilaudid as son feels like this may causing her to be confused    - Held the higher dose of PRN Oxycodone  "   - Recommend avoiding narcotics if possible given risk of delirium      - Antiemetics ordered   - Clear liquid diet   - Continue PPI (IV), carafate, simethicone     - General Surgery consulted and following    - HIDA planned for tomorrow with on call radiology team.      Likely Component of Delirium   Mild Cognitive Impairment  Noted. At baseline mentation per son on admission. On 12/6/24 evening into 12/7/24 morning the patient has been yelling at staff, stating they are not competent. Does not believe she is in a \"good\" hospital and wants to leave. Did not believe that she was in the same hospital. Thought doctors should be wearing suits. Son was present at bedside on 12/7/24 morning and she started yelling at him. Was able to be calmed down with IM Zyprexa and talking. Son stated that she had not been sleeping or eating well prior to this admission. Likely has a component of delirium on top of her cognitive impairment.     - Delirium precautions     - Will try low dose Seroquel at bedtime   - PRN Zyprexa for agitation   - Sitter as needed     Hypochloremic hyponatremia  Likely related to poor oral intake. S/P 1 L bolus in the ED as above. S/p mIVF.  - Na: 130 > 131   - Cl: 97 > 100    - Hold PTA Lasix 20 mg po every day PRN  - I&Os, monitor for signs of volume overload      Paroxysmal Atrial Fibrillation  Chronic anticoagulation with Coumadin  Supratherapeutic INR  INR 5.15 on admission. No active signs of bleeding. Hgb stable.   - INR: 5.15 > 3.92   - Hold coumadin 12/7, no pharmacy consult placed yet   - Continue PTA Lopressor BID      Normocytic Anemia  Stable Hgb on admission.   - Monitor      Microscopic Hematuria   Delayed right nephrogram without significant collecting system dilatation or radiopaque ureteral calculus  Nonobstructing R renal calculus: No flank pain, urinary sx. Not noted on CT from 2 days prior. Note UA from 12/1 was straight cathed, ? Trauma causing the microscopic hematuria as large " "blood noted at that time.   - Monitor clinically   - Repeat UA outpatient, if microscopic hematuria persists or pt develops flank pain or urinary sx will consult Urology, hold for now      Hepatic steatosis  - Noted on admission CT.      HFpEF, not in acute exacerbation  Severe coronary artery calcification per CT 12/1/24  Diffuse aoroiliac atheromatous disease per CT 12/1/24:   - Echo 12/2 with preserved EF, no RWMA, no significant valve disease, grade 1 or early diastolic dysfunciton.      T2DM, non-insulin dependent, controlled: A1C 6.7 last admission.   - Hold metformin   - Medium sliding scale insulin ordered   - BS per protocol   - Monitor for hypoglycemia      Hypertension  - Continue PTA Norvasc and Lopressor as above. Hold Lisinopril on admission      Hyperlipidemia  - Hold statin      Depression  - Continue PTA Celexa      Cerebellar ataxia  Hx of CVA  - Uses a walker for ambulatory assistance          Clinically Significant Risk Factors Present on Admission        # Hypokalemia: Lowest K = 3.2 mmol/L in last 2 days, will replace as needed  # Hyponatremia: Lowest Na = 130 mmol/L in last 2 days, will monitor as appropriate  # Hypochloremia: Lowest Cl = 97 mmol/L in last 2 days, will monitor as appropriate  # Hypocalcemia: Lowest Ca = 8.6 mg/dL in last 2 days, will monitor and replace as appropriate      # Drug Induced Coagulation Defect: home medication list includes an anticoagulant medication    # Hypertension: Noted on problem list           # Obesity: Estimated body mass index is 30.88 kg/m  as calculated from the following:    Height as of this encounter: 1.58 m (5' 2.21\").    Weight as of this encounter: 77.1 kg (169 lb 15.6 oz).                Diet: Clear Liquid Diet     DVT Prophylaxis: Warfarin   Waterman Catheter: Not present  Lines: None     Cardiac Monitoring: ACTIVE order. Indication: Tachyarrhythmias, acute (48 hours)  Code Status: Full Code      Disposition Plan       Expected Discharge Date: " 12/08/2024        Discharge Comments: CM  PT eval  HIDA scan  gen surg following  monitoring INR      Entered: Eri Pinon MD 12/07/2024, 1:08 PM     Notes Reviewed: H&P, general surgery    Family Updated: Spoke to son at bedside on 12/7/24    Care Team Updated: Nursing updated     Disposition: Likely 1-2 days pending HIDa scan, improvement in pain and final recs from General surgery       Medically Ready for Discharge: Anticipated Tomorrow        Eri Pinon MD  Hospitalist Service   [facility]  Securely message with the Vocera Web Console (learn more here)         Medical Decision Making       65 MINUTES SPENT BY ME on the date of service doing chart review, history, exam, documentation & further activities per the note.           Interval History     Overnight patient did not get much sleep and was being rude to nursing staff.    This morning patient was agitated and yelling at nursing. Refusing cares. calling out racial slurs to staff. Evaluated the patient and she felt that I was not qualified to be doctor. Son was at bedside trying to help. Ultimately patient was able to be calmed down with talking and accepted me as her doctor. Was also given IM Zyprexa.     -Data reviewed today: I reviewed all new labs and imaging results over the last 24 hours.     Physical Exam   Temp: 97.6  F (36.4  C) Temp src: Oral BP: (!) 168/84 Pulse: 63   Resp: 16 SpO2: 92 % O2 Device: None (Room air)    Vitals:    12/06/24 1841   Weight: 77.1 kg (169 lb 15.6 oz)     Vital Signs with Ranges  Temp:  [97.2  F (36.2  C)-99  F (37.2  C)] 97.6  F (36.4  C)  Pulse:  [63-84] 63  Resp:  [14-18] 16  BP: (106-179)/() 168/84  SpO2:  [92 %-98 %] 92 %  I/O last 3 completed shifts:  In: 0   Out: 400 [Urine:400]      Constitutional: Awake, alert, cooperative, no apparent distress.    HEENT: PERRL, Normocephalic, without obvious abnormality, atraumatic, oral pharynx with moist mucus  membranes  Pulmonary: No increased work of breathing, good air exchange, clear to auscultation bilaterally, no crackles or wheezing.  Cardiovascular: Regular rate and rhythm, normal S1 and S2  GI: Normal bowel sounds, soft, non-distended, mild tenderness to RUQ  Skin/Integumen: Visualized skin appeared clear.  Neuro: CN II-XII grossly intact.  Psych:  Alert and oriented x 2      Medications   Current Facility-Administered Medications   Medication Dose Route Frequency Provider Last Rate Last Admin    Patient is already receiving anticoagulation with heparin, enoxaparin (LOVENOX), warfarin (COUMADIN)  or other anticoagulant medication   Does not apply Continuous PRN Kenyatta Parker PA-C         Current Facility-Administered Medications   Medication Dose Route Frequency Provider Last Rate Last Admin    amLODIPine (NORVASC) tablet 5 mg  5 mg Oral Daily Kenyatta Parker PA-C   5 mg at 12/07/24 0911    citalopram (celeXA) tablet 40 mg  40 mg Oral Daily Kenyatta Parker PA-C   40 mg at 12/07/24 0911    gabapentin (NEURONTIN) capsule 200 mg  200 mg Oral TID Kenyatta Parker PA-C   200 mg at 12/07/24 0911    insulin aspart (NovoLOG) injection (RAPID ACTING)  1-7 Units Subcutaneous TID AC Kenyatta Parker PA-C        insulin aspart (NovoLOG) injection (RAPID ACTING)  1-5 Units Subcutaneous At Bedtime Kenyatta Parker PA-C        metoprolol tartrate (LOPRESSOR) tablet 25 mg  25 mg Oral BID Kenyatta Parker PA-C   25 mg at 12/07/24 0911    pantoprazole (PROTONIX) IV push injection 40 mg  40 mg Intravenous BID Kenyatta Parker PA-C   40 mg at 12/07/24 0911    potassium chloride topher ER (KLOR-CON M20) CR tablet 40 mEq  40 mEq Oral Once Eri Pinon MD        sucralfate (CARAFATE) tablet 1 g  1 g Oral 4x Daily Kenyatta Parker PA-C   1 g at 12/07/24 0911       Data   Recent Labs   Lab 12/07/24  1149 12/07/24  0853 12/07/24  0848  12/06/24  1839 12/06/24  0922 12/04/24  1537 12/04/24  0843 12/02/24  0534 12/01/24  1956   WBC  --   --  7.5  --  6.3  --  11.8*   < > 12.3*   HGB  --   --  11.3*  --  11.2*  --  10.0*   < > 12.6   MCV  --   --  88  --  84  --  85   < > 82   PLT  --   --  376  --  405  --  342   < > 478*   INR  --   --  3.92*  --  5.15*  --  3.01*   < > 2.14*   NA  --   --  131*  --  130*  --  131*   < > 128*   POTASSIUM  --   --  3.2*  --  3.4 3.7 3.3*   < > 4.3   CHLORIDE  --   --  100  --  97*  --  100   < > 89*   CO2  --   --  19*  --  21*  --  22   < > 18*   BUN  --   --  14.0  --  14.7  --  21.1   < > 16.2   CR  --   --  0.85  --  0.65  --  0.68   < > 0.90   ANIONGAP  --   --  12  --  12  --  9   < > 21*   SERA  --   --  8.6*  --  8.8  --  8.5*   < > 9.5   GLC 96 109* 108*   < > 113*  --  110*   < > 171*   ALBUMIN  --   --  3.5  --  3.9  --   --    < > 4.6   PROTTOTAL  --   --  6.5  --  7.1  --   --    < > 8.4*   BILITOTAL  --   --  0.3  --  0.3  --   --    < > 0.4   ALKPHOS  --   --  65  --  72  --   --    < > 95   ALT  --   --  12  --  15  --   --    < > 22   AST  --   --  15  --  23  --   --    < > 34   LIPASE  --   --   --   --  20  --   --   --  20    < > = values in this interval not displayed.       Recent Results (from the past 24 hours)   US Abdomen Limited    Narrative    US ABDOMEN LIMITED 12/6/2024 2:50 PM    CLINICAL HISTORY: Looking at gallbladder anatomy  TECHNIQUE: Limited abdominal ultrasound.    COMPARISON: CT earlier today    FINDINGS:    GALLBLADDER: Mildly distended gallbladder measuring 9.7 x 4.7 x 4.3  cm. A fold in the gallbladder fundus. A 1.4 x 1.3 x 0.6 cm echogenic  nonshadowing focus in the gallbladder could represent polyp, sludge  ball or stone. No gallbladder wall thickening. No pericholecystic  fluid. Sonographic Mesa sign is negative.    BILE DUCTS: There is no biliary dilatation. The common duct measures  3mm.    LIVER: Diffuse increased parenchymal echogenicity consistent with  hepatic  steatosis. Focal fat sparing in the gallbladder..    RIGHT KIDNEY: Mild pelviectasis. No calculi.    PANCREAS: The visualized portions of the pancreas are normal.      Impression    IMPRESSION:  1.  Distended gallbladder. A 1.4 cm echogenic nonshadowing focus could  represent a polyp, sludge ball or nonshadowing stone. No gallbladder  wall thickening, pericholecystic fluid or tenderness over the  gallbladder.  2.  Hepatic steatosis.    TAHIRA BARRON MD         SYSTEM ID:  [id number]

## 2024-12-07 NOTE — PROGRESS NOTES
"Pt is upset about overnight care and does not believe staff to be competent. She is refusing all care at this time including but not limited to: labs and vitals. Pt is calling out racial slurs to staff. She would like to go to the  where it is a \"real hospital with real doctors and real operating room\". Pt is safe in room with bed alarm on, will try to reapproach after a little time. Writer is going to call son now.   "

## 2024-12-07 NOTE — PROGRESS NOTES
-diagnostic tests and consults completed and resulted: NOT MET    -vital signs normal or at patient baseline: MET    -tolerating oral intake to maintain hydration: NOT MET    -adequate pain control on oral analgesics: NOT MET    -returns to baseline functional status: NOT MET    -safe disposition plan has been identified: NOT MET

## 2024-12-07 NOTE — PROGRESS NOTES
Observation Goals:    -diagnostic tests and consults completed and resulted - not met  -vital signs normal or at patient baseline - met  -tolerating oral intake to maintain hydration - partially met  -adequate pain control on oral analgesics - met  -returns to baseline functional status - partially met  -safe disposition plan has been identified - not met

## 2024-12-07 NOTE — PLAN OF CARE
Goal Outcome Evaluation:    -diagnostic tests and consults completed and resulted - not met  -vital signs normal or at patient baseline - met  -tolerating oral intake to maintain hydration - met  -adequate pain control on oral analgesics - met  -returns to baseline functional status - no  -safe disposition plan has been identified - not met

## 2024-12-07 NOTE — PROGRESS NOTES
"Surgery    Frustrated, agitated  Reports pain 8-9/10 (resting comfortably prior to discuss)  Points to RLQ  Son and gf present    Gen:  Awake, Alert, NAD  BP (!) 168/84 (BP Location: Right arm)   Pulse 63   Temp 97.6  F (36.4  C)   Resp 16   Ht 1.58 m (5' 2.21\")   Wt 77.1 kg (169 lb 15.6 oz)   SpO2 92%   BMI 30.88 kg/m    Resp - Non-labored  Abdomen - soft, mild tenderness at RLQ. No pain with deep palpation of RUQ/Right subcostal tender. non distended. Left side incision from stim healing appropriately  Extremities - no lower extremity edema or tenderness with palpation    Na+ 131  WBC 7.5  Hgb 11.3    A/P Gracenelly Jorgensen is a 74 year old female with gastroparesis and recent stimulator battery change admitted on 12/6/2024 for right sided abdominal pain. Ultrasound shows a distended gall bladder with 1.4 cm non-shadowing area without GB wall thickening, surrounding fluid or benitez's sign. HIDA ordered yesterday for further diagnostic investigation for gall bladder etiology for pain.    - HIDA planned for tomorrow with on call radiology team.  - optimize pain/mood management with zyprexa  - encourage incentive spirometer use  - ambulate  - following.    Aden Becker PA-C  Office: [phone]  Pager: [phone]    "

## 2024-12-07 NOTE — PROGRESS NOTES
[id number]: No acute events. Persistent R-sided abdominal pain controlled w/ IV dilaudid. Ani small amount clears. Intermittent nausea, declined anti-emetic. Son, Anand, planning to be at bedside tomorrow but not sure what time he will be here. Pt would like to call her son when doctors round if he is not at bedside when they round.     PRIMARY Concern: R-sided abdominal pain.   SAFETY RISK Concerns (fall risk, behaviors, etc.): Fall risk      Aggression Tool Color: Green  Isolation/Type: N/A  Tests/Procedures for NEXT shift: BG monitoring, AM labs  Consults? (Pending/following, signed-off?) General Surgery, PT, SW/CC consults  Where is patient from? (Home, TCU, etc.): Pt lives w/ son, Anand. She lives in the upstairs of the home and he lives downstairs  Other Important info for NEXT shift: Can be anxious and forgetful, easily reassured w/ reminders of care plan and pain plan. Keep white board updated w/ pain meds  Anticipated DC date & active delays: Pending consults and clinical progress  _____________________________________________________________________________  SUMMARY NOTE:  Orientation/Cognitive: A&Ox4, forgetful.   Observation Goals (Met/ Not Met): NOT MET  Mobility Level/Assist Equipment: Ax1 GB/walker  Antibiotics & Plan (IV/po, length of tx left): N/A  Pain Management: R sided abdominal pain controlled w. PRN dilaudid (0.2mg dose). Pt tried heat packs, did not like.  Tele/VS/O2: VSS on RA. Tele: SR. Hx of a-fib  ABNL Lab/BG: INR 5.15, recheck in AM. Hgb 11.2.NA: 130. .  Diet: Ani small amounts of clears  Bowel/Bladder: Continent. No BM  Skin Concerns: Intact.  Drains/Devices: Tele, IVF infusing via PIV  Patient Stated Goal for Today: pain control

## 2024-12-07 NOTE — PROGRESS NOTES
Spoke to SREEKANTH Morejon who said that patient can have a regular diet from their standpoint since surgery will not be done today. However, regarding HIDA scan, to reach out to radiology for protocol for diet advancement. Will defer this to next RN.

## 2024-12-08 ENCOUNTER — APPOINTMENT (OUTPATIENT)
Dept: NUCLEAR MEDICINE | Facility: CLINIC | Age: 74
DRG: 683 | End: 2024-12-08
Attending: SURGERY
Payer: COMMERCIAL

## 2024-12-08 LAB
ANION GAP SERPL CALCULATED.3IONS-SCNC: 14 MMOL/L (ref 7–15)
BUN SERPL-MCNC: 19.1 MG/DL (ref 8–23)
CALCIUM SERPL-MCNC: 9.2 MG/DL (ref 8.8–10.4)
CHLORIDE SERPL-SCNC: 100 MMOL/L (ref 98–107)
CREAT SERPL-MCNC: 1.19 MG/DL (ref 0.51–0.95)
EGFRCR SERPLBLD CKD-EPI 2021: 47 ML/MIN/1.73M2
GLUCOSE BLDC GLUCOMTR-MCNC: 108 MG/DL (ref 70–99)
GLUCOSE BLDC GLUCOMTR-MCNC: 121 MG/DL (ref 70–99)
GLUCOSE BLDC GLUCOMTR-MCNC: 125 MG/DL (ref 70–99)
GLUCOSE BLDC GLUCOMTR-MCNC: 142 MG/DL (ref 70–99)
GLUCOSE BLDC GLUCOMTR-MCNC: 153 MG/DL (ref 70–99)
GLUCOSE BLDC GLUCOMTR-MCNC: 157 MG/DL (ref 70–99)
GLUCOSE SERPL-MCNC: 114 MG/DL (ref 70–99)
HCO3 SERPL-SCNC: 21 MMOL/L (ref 22–29)
INR PPP: 2.17 (ref 0.85–1.15)
POTASSIUM SERPL-SCNC: 3.8 MMOL/L (ref 3.4–5.3)
SODIUM SERPL-SCNC: 135 MMOL/L (ref 135–145)

## 2024-12-08 PROCEDURE — 78226 HEPATOBILIARY SYSTEM IMAGING: CPT

## 2024-12-08 PROCEDURE — 96376 TX/PRO/DX INJ SAME DRUG ADON: CPT

## 2024-12-08 PROCEDURE — 343N000001 HC RX 343 MED OP 636: Performed by: SURGERY

## 2024-12-08 PROCEDURE — 96375 TX/PRO/DX INJ NEW DRUG ADDON: CPT

## 2024-12-08 PROCEDURE — 85610 PROTHROMBIN TIME: CPT | Performed by: PHYSICIAN ASSISTANT

## 2024-12-08 PROCEDURE — G0378 HOSPITAL OBSERVATION PER HR: HCPCS

## 2024-12-08 PROCEDURE — 36415 COLL VENOUS BLD VENIPUNCTURE: CPT | Performed by: PHYSICIAN ASSISTANT

## 2024-12-08 PROCEDURE — 258N000003 HC RX IP 258 OP 636: Performed by: STUDENT IN AN ORGANIZED HEALTH CARE EDUCATION/TRAINING PROGRAM

## 2024-12-08 PROCEDURE — 250N000011 HC RX IP 250 OP 636: Performed by: PHYSICIAN ASSISTANT

## 2024-12-08 PROCEDURE — 96361 HYDRATE IV INFUSION ADD-ON: CPT

## 2024-12-08 PROCEDURE — 250N000013 HC RX MED GY IP 250 OP 250 PS 637: Performed by: PHYSICIAN ASSISTANT

## 2024-12-08 PROCEDURE — 250N000011 HC RX IP 250 OP 636: Performed by: STUDENT IN AN ORGANIZED HEALTH CARE EDUCATION/TRAINING PROGRAM

## 2024-12-08 PROCEDURE — 250N000013 HC RX MED GY IP 250 OP 250 PS 637: Performed by: STUDENT IN AN ORGANIZED HEALTH CARE EDUCATION/TRAINING PROGRAM

## 2024-12-08 PROCEDURE — 99232 SBSQ HOSP IP/OBS MODERATE 35: CPT | Performed by: STUDENT IN AN ORGANIZED HEALTH CARE EDUCATION/TRAINING PROGRAM

## 2024-12-08 PROCEDURE — 80048 BASIC METABOLIC PNL TOTAL CA: CPT | Performed by: STUDENT IN AN ORGANIZED HEALTH CARE EDUCATION/TRAINING PROGRAM

## 2024-12-08 PROCEDURE — 250N000009 HC RX 250: Performed by: PHYSICIAN ASSISTANT

## 2024-12-08 PROCEDURE — 82962 GLUCOSE BLOOD TEST: CPT

## 2024-12-08 PROCEDURE — A9537 TC99M MEBROFENIN: HCPCS | Performed by: SURGERY

## 2024-12-08 RX ORDER — METOCLOPRAMIDE 5 MG/1
5 TABLET ORAL 3 TIMES DAILY
Status: DISCONTINUED | OUTPATIENT
Start: 2024-12-08 | End: 2024-12-10 | Stop reason: HOSPADM

## 2024-12-08 RX ORDER — LABETALOL HYDROCHLORIDE 5 MG/ML
20 INJECTION, SOLUTION INTRAVENOUS EVERY 6 HOURS PRN
Status: DISCONTINUED | OUTPATIENT
Start: 2024-12-08 | End: 2024-12-10 | Stop reason: HOSPADM

## 2024-12-08 RX ORDER — SODIUM CHLORIDE, SODIUM LACTATE, POTASSIUM CHLORIDE, CALCIUM CHLORIDE 600; 310; 30; 20 MG/100ML; MG/100ML; MG/100ML; MG/100ML
INJECTION, SOLUTION INTRAVENOUS CONTINUOUS
Status: ACTIVE | OUTPATIENT
Start: 2024-12-08 | End: 2024-12-09

## 2024-12-08 RX ORDER — HYDRALAZINE HYDROCHLORIDE 20 MG/ML
INJECTION INTRAMUSCULAR; INTRAVENOUS
Status: COMPLETED
Start: 2024-12-08 | End: 2024-12-08

## 2024-12-08 RX ORDER — KIT FOR THE PREPARATION OF TECHNETIUM TC 99M MEBROFENIN 45 MG/10ML
6 INJECTION, POWDER, LYOPHILIZED, FOR SOLUTION INTRAVENOUS ONCE
Status: COMPLETED | OUTPATIENT
Start: 2024-12-08 | End: 2024-12-08

## 2024-12-08 RX ORDER — HYDRALAZINE HYDROCHLORIDE 20 MG/ML
10 INJECTION INTRAMUSCULAR; INTRAVENOUS EVERY 6 HOURS PRN
Status: DISCONTINUED | OUTPATIENT
Start: 2024-12-08 | End: 2024-12-10 | Stop reason: HOSPADM

## 2024-12-08 RX ADMIN — SUCRALFATE 1 G: 1 TABLET ORAL at 21:05

## 2024-12-08 RX ADMIN — ONDANSETRON 4 MG: 2 INJECTION, SOLUTION INTRAMUSCULAR; INTRAVENOUS at 08:46

## 2024-12-08 RX ADMIN — ACETAMINOPHEN 975 MG: 325 TABLET, FILM COATED ORAL at 21:05

## 2024-12-08 RX ADMIN — QUETIAPINE FUMARATE 25 MG: 25 TABLET ORAL at 21:05

## 2024-12-08 RX ADMIN — HYDRALAZINE HYDROCHLORIDE 10 MG: 20 INJECTION INTRAMUSCULAR; INTRAVENOUS at 12:06

## 2024-12-08 RX ADMIN — METOPROLOL TARTRATE 25 MG: 25 TABLET, FILM COATED ORAL at 21:05

## 2024-12-08 RX ADMIN — ONDANSETRON 4 MG: 2 INJECTION, SOLUTION INTRAMUSCULAR; INTRAVENOUS at 15:38

## 2024-12-08 RX ADMIN — SODIUM CHLORIDE, POTASSIUM CHLORIDE, SODIUM LACTATE AND CALCIUM CHLORIDE: 600; 310; 30; 20 INJECTION, SOLUTION INTRAVENOUS at 17:21

## 2024-12-08 RX ADMIN — PANTOPRAZOLE SODIUM 40 MG: 40 INJECTION, POWDER, FOR SOLUTION INTRAVENOUS at 08:49

## 2024-12-08 RX ADMIN — PROCHLORPERAZINE EDISYLATE 5 MG: 5 INJECTION INTRAMUSCULAR; INTRAVENOUS at 10:26

## 2024-12-08 RX ADMIN — AMLODIPINE BESYLATE 5 MG: 5 TABLET ORAL at 14:42

## 2024-12-08 RX ADMIN — PANTOPRAZOLE SODIUM 40 MG: 40 INJECTION, POWDER, FOR SOLUTION INTRAVENOUS at 21:04

## 2024-12-08 RX ADMIN — METOCLOPRAMIDE 5 MG: 5 TABLET ORAL at 14:05

## 2024-12-08 RX ADMIN — GABAPENTIN 200 MG: 100 CAPSULE ORAL at 21:05

## 2024-12-08 RX ADMIN — METOCLOPRAMIDE 5 MG: 5 TABLET ORAL at 20:14

## 2024-12-08 RX ADMIN — MEBROFENIN 6.2 MILLICURIE: 45 INJECTION, POWDER, LYOPHILIZED, FOR SOLUTION INTRAVENOUS at 10:49

## 2024-12-08 ASSESSMENT — ACTIVITIES OF DAILY LIVING (ADL)
ADLS_ACUITY_SCORE: 60
ADLS_ACUITY_SCORE: 59
ADLS_ACUITY_SCORE: 60
ADLS_ACUITY_SCORE: 59
ADLS_ACUITY_SCORE: 60
ADLS_ACUITY_SCORE: 59
ADLS_ACUITY_SCORE: 60
ADLS_ACUITY_SCORE: 59
ADLS_ACUITY_SCORE: 60
ADLS_ACUITY_SCORE: 59

## 2024-12-08 NOTE — PROVIDER NOTIFICATION
MD Notification    Notified Person: MD    Notified Person Name: Dr. Pinon    Notification Date/Time: 12/8/24 @ 1315    Notification Interaction: vocera message    Purpose of Notification: Pt still unable to take PO due to nausea. She's had PRN zofran and compazine. Her BP continues to be elevated 187/86 after hydralazine. (She did not take AM doses of metoprolol and norvasc.) Do you want to try anything else?    Orders Received: Reglan now scheduled, PRN labetalol. Try giving oral meds 30 min after reglan.    Comments:

## 2024-12-08 NOTE — PLAN OF CARE
Goal Outcome Evaluation:  DATE & TIME:24, [id number]  Cognitive Concerns/ Orientation:Alert to self, confused  BEHAVIOR & AGGRESSION TOOL COLOR:Green, calm  ABNL VS/O2:VSS on RA  MOBILITY:Ax1 gb/w  TELE:A fib  PAIN MANAGMENT:denies  DIET:NPO, No opiates or food 4hrs prior to HIDA scan.    BOWEL/BLADDER:Incontinent of B/B  DRAIN/DEVICES:PIV SL  TESTS/PROCEDURES:B  D/C DATE:  Pending  OTHER IMPORTANT INFO:HIDA scan at 0900.

## 2024-12-08 NOTE — PROGRESS NOTES
Observation goals  PRIOR TO DISCHARGE        Comments:   -diagnostic tests and consults completed and resulted: Not met  -vital signs normal or at patient baseline: Met  -tolerating oral intake to maintain hydration: Met  -adequate pain control on oral analgesics: Met  -returns to baseline functional status: Not met  -safe disposition plan has been identified: Not met  Nurse to notify provider when observation goals have been met and patient is ready for discharge.

## 2024-12-08 NOTE — PROGRESS NOTES
Observation goals  PRIOR TO DISCHARGE        Comments:   -diagnostic tests and consults completed and resulted: Not met- GI consult pending  -vital signs normal or at patient baseline: not met- hypertensive  -tolerating oral intake to maintain hydration: not met  -adequate pain control on oral analgesics: Met  -returns to baseline functional status: Not met  -safe disposition plan has been identified: Not met  Nurse to notify provider when observation goals have been met and patient is ready for discharge.

## 2024-12-08 NOTE — PHARMACY-ANTICOAGULATION SERVICE
Clinical Pharmacy - Warfarin Dosing Consult     Pharmacy has been consulted to manage this patient s warfarin therapy.  Indication: Atrial Fibrillation  Therapy Goal: INR 2-3  Warfarin Prior to Admission: Yes  Warfarin PTA Regimen: 15 mg every Wed, Sat; 10 mg all other days    INR   Date Value Ref Range Status   12/07/2024 3.92 (H) 0.85 - 1.15 Final   12/06/2024 5.15 (HH) 0.85 - 1.15 Final       Hold warfarin today.  Pharmacy will monitor Grace Jorgensen daily and order warfarin doses to achieve specified goal.      Please contact pharmacy as soon as possible if the warfarin needs to be held for a procedure or if the warfarin goals change.     [facility], located within [facility]

## 2024-12-08 NOTE — PLAN OF CARE
Orientation: A&Ox4, calm & cooperative.     Vitals/Tele: VSS RA, RUQ pain managed with scheduled tyl.     IV Access/drains: PIV SL    Diet: Regular > NPO at MN. No opiates or food 4hrs prior to HIDA scan.      B  K+ 3.5    Mobility: A1 GB&W    GI/: Incontinent B&B, purewick in place. BS active, +gas. Patient tolerating oral intake. Denies nausea.     Wound/Skin: Abdominal contour irregular, LUQ neuro stimulator in place. Abdominal incision CDI.     Consults: NM, PT, CM    Discharge Plan: HIDA scan at 0900.       See Flow sheets for assessment

## 2024-12-08 NOTE — PLAN OF CARE
Goal Outcome Evaluation:something milder if she complains of pain. Son thinks this contributes to her confusion and agitation. See previous note from this am. Patient care order not to give narcotics 4h before HIDA scan, or will delay.        Orientation: A/O x4 intermitting confusion and agitation, refused most her care , meds, hospital meals Denies general discomforts.     Vitals: vss on R A Q4 vitals  fall precaution     IV Access/drains: PIV SL , purewick in place     Diet: reg, glucose checks, NPO at might     Mobility: x1 G,B/W long arm stuff     GI/: incontinent     Wound/Skin: DWL     Consults: CM,SW, PT   Discharge Plan: Pending       See Flow sheets for assessment

## 2024-12-08 NOTE — PROGRESS NOTES
DATE & TIME:24, [id number]  Cognitive Concerns/ Orientation:Alert to self, confused  BEHAVIOR & AGGRESSION TOOL COLOR:Green, calm  ABNL VS/O2:VSS on RA  MOBILITY:Ax1 gb/w  PAIN MANAGMENT:denies  DIET:NPO, No opiates or food 4hrs prior to HIDA scan.    BOWEL/BLADDER:Incontinent of B/B  DRAIN/DEVICES:PIV SL  TELEMETRY RHYTHM:Afib  TESTS/PROCEDURES:B  D/C DATE:  Pending  OTHER IMPORTANT INFO:HIDA scan at 0900.         Observation goals  PRIOR TO DISCHARGE       Comments:   -diagnostic tests and consults completed and resulted: Not met  -vital signs normal or at patient baseline: Met  -tolerating oral intake to maintain hydration: Met  -adequate pain control on oral analgesics: Met  -returns to baseline functional status: Not met  -safe disposition plan has been identified: Not met  Nurse to notify provider when observation goals have been met and patient is ready for discharge.

## 2024-12-08 NOTE — PROGRESS NOTES
Observation goals  PRIOR TO DISCHARGE        Comments:   -diagnostic tests and consults completed and resulted: Not met- HIDA scan pending  -vital signs normal or at patient baseline: Met  -tolerating oral intake to maintain hydration: not met  -adequate pain control on oral analgesics: Met  -returns to baseline functional status: Not met  -safe disposition plan has been identified: Not met  Nurse to notify provider when observation goals have been met and patient is ready for discharge.

## 2024-12-08 NOTE — PLAN OF CARE
ORIENTATION/NEURO: A/o x 2-3, disoriented to time and situation, generalized weakness  VITALS/TELE: Hypertensive up to 200/93 (unable to take BP meds in AM due to nausea), PRN hydralazine given x 1. When nausea improved, pt able to take amlodipine and BP improved to 142/68  PAIN: Denies pain  RESP: RA, LS clear  DIET: Regular- no oral intake except sips of water  GI/: Persistent nausea. Emesis x 2. PRN zofran and compazine given. Reglan now scheduled. No BM this shift.   LINES/DRAINS: L PIV SL, Rootdownck  LABS: Protocols: K+ 3.8- no replacement today, Creat 1.19  SKIN: Scattered bruising, no wounds  ASSIST/AMBULATION: Ax1-2 GB/W  FALL RISK: High  PLAN/DISPOSITION: HIDA scan done, GI consult pending.  Nausea affecting PO intake. She refused all oral meds except amlodipine and reglan. Reglan now scheduled- try to time oral meds 30 min after reglan. Started LR @ 75 ml/hr due to low oral intake.      Goal Outcome Evaluation: not progressing

## 2024-12-08 NOTE — PROGRESS NOTES
No significant changes.  Continue to await results of HIDA scan.  Acute care surgical team will follow-up on results and make further plans thereafter.

## 2024-12-08 NOTE — PROVIDER NOTIFICATION
MD Notification    Notified Person: MD    Notified Person Name:  Zi    Notification Date/Time: 12/8/24 @ 1015    Notification Interaction: vocera message    Purpose of Notification: Pt is refusing to take PO meds while NPO due to nausea. Her BP is 190/95. Do you want to order any IV meds for BP?    Orders Received: PRN IV hydralazine     Comments:

## 2024-12-08 NOTE — PROGRESS NOTES
[facility]  Hospitalist Progress Note    Assessment & Plan   Grace Jorgensen is a 74 year old female with below PMHx including recent admission from 12/2/24-12/4/24 for dehydration related to progressive N/V after her gastric neurostimulator settings were adjusted 3 weeks prior who re-presented 12/6/24 for further evaluation of R sided abdominal pain that developed on day of discharge and has persisted with associated nausea and general poor oral intake. Registered to observation for further evaluation and treatment.      R Sided Abdominal Pain, Intractable Nausea and Vomiting  Gastroparesis s/p Gastric Neuro-stimulator Placement (L abdominal wall)  Cholelithiasis and Borderline Gallbladder Distention  GERD  Hiatal hernia, small to moderate:  Three weeks ago battery and settings changed with progression of sx. Note during last admission, [facility] GI was consulted and pt's stimulator settings were adjusted again, pt was discharged on Reglan.  In the ED, WBC 6.3, lactic 0.9, hepatic panel unremarkable. UA not indicative of infection. CT with Cholelithiasis and borderline gallbladder distention. No  additional signs for cholecystitis. Reviewed CT C/A/P from 12/1/24, at that time note fluid filled esophagus of uncertain significance. No comment of cholelithiasis or GB distention at that time nor comment of renal pathology as below. Received Reglan 5 mg IV, 1 L bolus in the ED      - RUQ US: Distended gallbladder. A 1.4 cm echogenic nonshadowing focus could represent a polyp, sludge ball or nonshadowing stone. No gallbladder wall thickening, pericholecystic fluid or tenderness over the     - HIDA Scan: No evidence of acute cholecystitis      - Analgesic regimen                - Added scheduled Tylenol               - PRN Oxycodone 2.5mg                - Held IV Dilaudid as son feels like this may causing her to be confused                - Held the higher dose of PRN Oxycodone                -  "Recommend avoiding narcotics if possible given risk of delirium                  - PRN Antiemetics   - Continue PPI (IV), carafate, simethicone   - Schedule Reglan TID     - General Surgery consulted and following                - Awaiting recommendation     - [facility] consulted to see if stimulator needs to be assessed      Likely Component of Delirium   Mild Cognitive Impairment  Noted. At baseline mentation per son on admission. On 12/6/24 evening into 12/7/24 morning the patient has been yelling at staff, stating they are not competent. Does not believe she is in a \"good\" hospital and wants to leave. Did not believe that she was in the same hospital. Thought doctors should be wearing suits. Son was present at bedside on 12/7/24 morning and she started yelling at him. Was able to be calmed down with IM Zyprexa and talking. Son stated that she had not been sleeping or eating well prior to this admission. Likely has a component of delirium on top of her cognitive impairment.      - Delirium precautions      - Continue low dose Seroquel 25mng at bedtime   - PRN Zyprexa for agitation   - Sitter as needed     - PT consulted      Hypochloremic hyponatremia  Likely related to poor oral intake. S/P 1 L bolus in the ED as above. S/p mIVF.  - Na: 130 > 131   - Cl: 97 > 100    - Labs ordered but have not been drawn yet for 12/8/24      - Hold PTA Lasix 20 mg po every day PRN  - I&Os, monitor for signs of volume overload      Paroxysmal Atrial Fibrillation  Chronic anticoagulation with Coumadin  Supratherapeutic INR  INR 5.15 on admission. No active signs of bleeding. Hgb stable.   - INR: 5.15 > 3.92    - Labs pending for 12/8/24  - Pharmacy consulted   - Continue PTA Lopressor BID      Normocytic Anemia  Stable Hgb on admission.   - Monitor      Microscopic Hematuria   Delayed right nephrogram without significant collecting system dilatation or radiopaque ureteral calculus  Nonobstructing R renal calculus: No flank pain, " "urinary sx. Not noted on CT from 2 days prior. Note UA from 12/1 was straight cathed, ? Trauma causing the microscopic hematuria as large blood noted at that time.   - Monitor clinically   - Repeat UA outpatient, if microscopic hematuria persists or pt develops flank pain or urinary sx will consult Urology, hold for now      Hepatic steatosis  - Noted on admission CT.      HFpEF, not in acute exacerbation  Severe coronary artery calcification per CT 12/1/24  Diffuse aoroiliac atheromatous disease per CT 12/1/24:   - Echo 12/2 with preserved EF, no RWMA, no significant valve disease, grade 1 or early diastolic dysfunciton.      T2DM, non-insulin dependent, controlled: A1C 6.7 last admission.   - Hold metformin   - Medium sliding scale insulin ordered   - BS per protocol   - Monitor for hypoglycemia      Hypertension  - Continue to hold Lisinopril on admission   - Continue PTA Norvasc and Lopressor as above  - PRN IV Hydralazin and Labetolol     - On 12/8/24, patient has been refusing to take meds due to nausea. Will hold on changing scheduled meds to IV for now and use IV. If this continues, may need to schedule IV meds     Hyperlipidemia  - Hold statin      Depression  - Continue PTA Celexa      Cerebellar ataxia  Hx of CVA  - Uses a walker for ambulatory assistance      Clinically Significant Risk Factors Present on Admission        # Hypokalemia: Lowest K = 3.2 mmol/L in last 2 days, will replace as needed  # Hyponatremia: Lowest Na = 131 mmol/L in last 2 days, will monitor as appropriate   # Hypocalcemia: Lowest Ca = 8.6 mg/dL in last 2 days, will monitor and replace as appropriate      # Drug Induced Coagulation Defect: home medication list includes an anticoagulant medication    # Hypertension: Noted on problem list           # Obesity: Estimated body mass index is 30.88 kg/m  as calculated from the following:    Height as of this encounter: 1.58 m (5' 2.21\").    Weight as of this encounter: 77.1 kg (169 lb " 15.6 oz).                Diet: Regular Diet Adult     DVT Prophylaxis: Warfarin and Pneumatic Compression Devices   Waterman Catheter: Not present  Lines: None     Cardiac Monitoring: ACTIVE order. Indication: Tachyarrhythmias, acute (48 hours)  Code Status: Full Code      Disposition Plan       Expected Discharge Date: 12/09/2024        Discharge Comments: NPO for HIDA scan  gen surg,  CM/SW,  PT eval  monitoring INR      Entered: Eri Pinon MD 12/08/2024, 2:05 PM       Family Updated: Spoke to son Anand at bedside on 12/8/24    Care Team Updated: Nursing updated     Disposition: Likely 1-2 days pending improvement in pain/nausea and final recs from General surgery and [facility]      Medically Ready for Discharge: Anticipated in 2-4 Days        Eri Pinon MD  Hospitalist Service   [facility]  Securely message with the [employer] (learn more here)         Medical Decision Making       45 MINUTES SPENT BY ME on the date of service doing chart review, history, exam, documentation & further activities per the note.           Interval History     No acute overnight events.    This morning the patient states that she is so-so. Feeling nauseous this morning. States that she was able to eat food yesterday but nothing this morning.     -Data reviewed today: I reviewed all new labs and imaging results over the last 24 hours.     Physical Exam   Temp: 98.5  F (36.9  C) Temp src: Oral BP: (!) 187/86 Pulse: 91   Resp: 16 SpO2: 92 % O2 Device: None (Room air)    Vitals:    12/06/24 1841   Weight: 77.1 kg (169 lb 15.6 oz)     Vital Signs with Ranges  Temp:  [97.2  F (36.2  C)-98.7  F (37.1  C)] 98.5  F (36.9  C)  Pulse:  [62-96] 91  Resp:  [16] 16  BP: (110-200)/(44-95) 187/86  SpO2:  [88 %-94 %] 92 %  I/O last 3 completed shifts:  In: -   Out: 900 [Urine:900]      Constitutional: Awake, nauseous   HEENT: PERRL, Normocephalic, without obvious abnormality, atraumatic, oral  pharynx with moist mucus membranes  Pulmonary: No increased work of breathing, good air exchange, clear to auscultation bilaterally, no crackles or wheezing.  Cardiovascular: Regular rate and rhythm, normal S1 and S2  GI: Normal bowel sounds, soft, non-distended, mild tenderness to RUQ  Skin/Integumen: Visualized skin appeared clear.  Neuro: CN II-XII grossly intact.  Psych:  Alert and oriented x 2      Medications   Current Facility-Administered Medications   Medication Dose Route Frequency Provider Last Rate Last Admin    Patient is already receiving anticoagulation with heparin, enoxaparin (LOVENOX), warfarin (COUMADIN)  or other anticoagulant medication   Does not apply Continuous PRN Kenyatta Parker PA-C         Current Facility-Administered Medications   Medication Dose Route Frequency Provider Last Rate Last Admin    acetaminophen (TYLENOL) tablet 975 mg  975 mg Oral TID Eri Pinon MD   975 mg at 12/07/24 2002    amLODIPine (NORVASC) tablet 5 mg  5 mg Oral Daily Kenyatta Parker PA-C   5 mg at 12/07/24 0911    citalopram (celeXA) tablet 40 mg  40 mg Oral Daily Kenyatta Parker PA-C   40 mg at 12/07/24 0911    gabapentin (NEURONTIN) capsule 200 mg  200 mg Oral TID Kenyatta Parker PA-C   200 mg at 12/07/24 2002    insulin aspart (NovoLOG) injection (RAPID ACTING)  1-7 Units Subcutaneous TID AC Kenyatta Parker PA-C   1 Units at 12/08/24 1306    insulin aspart (NovoLOG) injection (RAPID ACTING)  1-5 Units Subcutaneous At Bedtime Kenyatta Parker PA-C        metoclopramide (REGLAN) tablet 5 mg  5 mg Oral TID Eri Pinon MD        metoprolol tartrate (LOPRESSOR) tablet 25 mg  25 mg Oral BID Kenyatta Parker PA-C   25 mg at 12/07/24 2002    pantoprazole (PROTONIX) IV push injection 40 mg  40 mg Intravenous BID Kenyatta Parker PA-C   40 mg at 12/08/24 0849    QUEtiapine (SEROquel) tablet 25 mg  25 mg Oral At Bedtime  Eri Pinon MD   25 mg at 12/07/24 2132    sucralfate (CARAFATE) tablet 1 g  1 g Oral 4x Daily Kenyatta Parker PA-C   1 g at 12/07/24 2002       Data   Recent Labs   Lab 12/08/24  1214 12/08/24  0841 12/08/24  0208 12/07/24  2129 12/07/24  1829 12/07/24  0853 12/07/24  0848 12/06/24  1839 12/06/24  0922 12/04/24  1537 12/04/24  0843 12/02/24  0534 12/01/24 1956   WBC  --   --   --   --   --   --  7.5  --  6.3  --  11.8*   < > 12.3*   HGB  --   --   --   --   --   --  11.3*  --  11.2*  --  10.0*   < > 12.6   MCV  --   --   --   --   --   --  88  --  84  --  85   < > 82   PLT  --   --   --   --   --   --  376  --  405  --  342   < > 478*   INR  --   --   --   --   --   --  3.92*  --  5.15*  --  3.01*   < > 2.14*   NA  --   --   --   --   --   --  131*  --  130*  --  131*   < > 128*   POTASSIUM  --   --   --   --  3.5  --  3.2*  --  3.4   < > 3.3*   < > 4.3   CHLORIDE  --   --   --   --   --   --  100  --  97*  --  100   < > 89*   CO2  --   --   --   --   --   --  19*  --  21*  --  22   < > 18*   BUN  --   --   --   --   --   --  14.0  --  14.7  --  21.1   < > 16.2   CR  --   --   --   --   --   --  0.85  --  0.65  --  0.68   < > 0.90   ANIONGAP  --   --   --   --   --   --  12  --  12  --  9   < > 21*   SERA  --   --   --   --   --   --  8.6*  --  8.8  --  8.5*   < > 9.5   * 121* 153*   < >  --    < > 108*   < > 113*  --  110*   < > 171*   ALBUMIN  --   --   --   --   --   --  3.5  --  3.9  --   --    < > 4.6   PROTTOTAL  --   --   --   --   --   --  6.5  --  7.1  --   --    < > 8.4*   BILITOTAL  --   --   --   --   --   --  0.3  --  0.3  --   --    < > 0.4   ALKPHOS  --   --   --   --   --   --  65  --  72  --   --    < > 95   ALT  --   --   --   --   --   --  12  --  15  --   --    < > 22   AST  --   --   --   --   --   --  15  --  23  --   --    < > 34   LIPASE  --   --   --   --   --   --   --   --  20  --   --   --  20    < > = values in this interval not displayed.       Recent Results  (from the past 24 hours)   NM HepatOBiliary Scan    Narrative    EXAM: NM HEPATOBILIARY SCAN  LOCATION: [facility]  DATE: 12/08/2024    INDICATION: Rule out cholecystitis. No need for EF.  COMPARISON: Abdominal ultrasound on 12/06/2024 and CT chest, abdomen and pelvis on 12/01/2024.  TECHNIQUE: 6.2 mCi of Tc-99m mebrofenin, IV. Anterior planar imaging of the abdomen.     FINDINGS: Normal radionuclide activity in liver, gallbladder, bile ducts, and small bowel. No evidence of cystic or common duct obstruction or intrinsic liver disease.      Impression    IMPRESSION: No evidence of acute cholecystitis.

## 2024-12-09 ENCOUNTER — APPOINTMENT (OUTPATIENT)
Dept: PHYSICAL THERAPY | Facility: CLINIC | Age: 74
DRG: 683 | End: 2024-12-09
Attending: PHYSICIAN ASSISTANT
Payer: COMMERCIAL

## 2024-12-09 LAB
ALBUMIN UR-MCNC: 300 MG/DL
ANION GAP SERPL CALCULATED.3IONS-SCNC: 11 MMOL/L (ref 7–15)
APPEARANCE UR: ABNORMAL
BACTERIA #/AREA URNS HPF: ABNORMAL /HPF
BILIRUB UR QL STRIP: ABNORMAL
BUN SERPL-MCNC: 25.5 MG/DL (ref 8–23)
CALCIUM SERPL-MCNC: 9.3 MG/DL (ref 8.8–10.4)
CHLORIDE SERPL-SCNC: 100 MMOL/L (ref 98–107)
COLOR UR AUTO: ABNORMAL
CREAT SERPL-MCNC: 1.58 MG/DL (ref 0.51–0.95)
EGFRCR SERPLBLD CKD-EPI 2021: 34 ML/MIN/1.73M2
GLUCOSE BLDC GLUCOMTR-MCNC: 101 MG/DL (ref 70–99)
GLUCOSE BLDC GLUCOMTR-MCNC: 102 MG/DL (ref 70–99)
GLUCOSE BLDC GLUCOMTR-MCNC: 104 MG/DL (ref 70–99)
GLUCOSE BLDC GLUCOMTR-MCNC: 116 MG/DL (ref 70–99)
GLUCOSE BLDC GLUCOMTR-MCNC: 96 MG/DL (ref 70–99)
GLUCOSE SERPL-MCNC: 98 MG/DL (ref 70–99)
GLUCOSE UR STRIP-MCNC: NEGATIVE MG/DL
HCO3 SERPL-SCNC: 23 MMOL/L (ref 22–29)
HGB UR QL STRIP: ABNORMAL
HOLD SPECIMEN: NORMAL
INR PPP: 1.79 (ref 0.85–1.15)
KETONES UR STRIP-MCNC: 20 MG/DL
LEUKOCYTE ESTERASE UR QL STRIP: ABNORMAL
MUCOUS THREADS #/AREA URNS LPF: PRESENT /LPF
NITRATE UR QL: NEGATIVE
PH UR STRIP: 7 [PH] (ref 5–7)
POTASSIUM SERPL-SCNC: 3.3 MMOL/L (ref 3.4–5.3)
POTASSIUM SERPL-SCNC: 3.7 MMOL/L (ref 3.4–5.3)
RBC URINE: 2 /HPF
SODIUM SERPL-SCNC: 134 MMOL/L (ref 135–145)
SP GR UR STRIP: 1.02 (ref 1–1.03)
SQUAMOUS EPITHELIAL: <1 /HPF
UROBILINOGEN UR STRIP-MCNC: 4 MG/DL
WBC CLUMPS #/AREA URNS HPF: PRESENT /HPF
WBC URINE: 173 /HPF

## 2024-12-09 PROCEDURE — 99232 SBSQ HOSP IP/OBS MODERATE 35: CPT | Performed by: PHYSICIAN ASSISTANT

## 2024-12-09 PROCEDURE — G0378 HOSPITAL OBSERVATION PER HR: HCPCS

## 2024-12-09 PROCEDURE — 250N000013 HC RX MED GY IP 250 OP 250 PS 637: Performed by: NURSE PRACTITIONER

## 2024-12-09 PROCEDURE — 87186 SC STD MICRODIL/AGAR DIL: CPT | Performed by: NURSE PRACTITIONER

## 2024-12-09 PROCEDURE — 97116 GAIT TRAINING THERAPY: CPT | Mod: GP

## 2024-12-09 PROCEDURE — 87088 URINE BACTERIA CULTURE: CPT | Performed by: NURSE PRACTITIONER

## 2024-12-09 PROCEDURE — 96376 TX/PRO/DX INJ SAME DRUG ADON: CPT

## 2024-12-09 PROCEDURE — 80048 BASIC METABOLIC PNL TOTAL CA: CPT | Performed by: STUDENT IN AN ORGANIZED HEALTH CARE EDUCATION/TRAINING PROGRAM

## 2024-12-09 PROCEDURE — 36415 COLL VENOUS BLD VENIPUNCTURE: CPT | Performed by: STUDENT IN AN ORGANIZED HEALTH CARE EDUCATION/TRAINING PROGRAM

## 2024-12-09 PROCEDURE — 82962 GLUCOSE BLOOD TEST: CPT

## 2024-12-09 PROCEDURE — 99232 SBSQ HOSP IP/OBS MODERATE 35: CPT | Performed by: NURSE PRACTITIONER

## 2024-12-09 PROCEDURE — 85610 PROTHROMBIN TIME: CPT | Performed by: PHYSICIAN ASSISTANT

## 2024-12-09 PROCEDURE — 250N000009 HC RX 250: Performed by: PHYSICIAN ASSISTANT

## 2024-12-09 PROCEDURE — 97162 PT EVAL MOD COMPLEX 30 MIN: CPT | Mod: GP

## 2024-12-09 PROCEDURE — 36415 COLL VENOUS BLD VENIPUNCTURE: CPT | Performed by: NURSE PRACTITIONER

## 2024-12-09 PROCEDURE — 250N000011 HC RX IP 250 OP 636: Performed by: NURSE PRACTITIONER

## 2024-12-09 PROCEDURE — 97530 THERAPEUTIC ACTIVITIES: CPT | Mod: GP

## 2024-12-09 PROCEDURE — 250N000013 HC RX MED GY IP 250 OP 250 PS 637: Performed by: STUDENT IN AN ORGANIZED HEALTH CARE EDUCATION/TRAINING PROGRAM

## 2024-12-09 PROCEDURE — 250N000013 HC RX MED GY IP 250 OP 250 PS 637: Performed by: PHYSICIAN ASSISTANT

## 2024-12-09 PROCEDURE — 120N000001 HC R&B MED SURG/OB

## 2024-12-09 PROCEDURE — 81001 URINALYSIS AUTO W/SCOPE: CPT | Performed by: NURSE PRACTITIONER

## 2024-12-09 PROCEDURE — 250N000013 HC RX MED GY IP 250 OP 250 PS 637: Performed by: INTERNAL MEDICINE

## 2024-12-09 PROCEDURE — 96361 HYDRATE IV INFUSION ADD-ON: CPT

## 2024-12-09 PROCEDURE — 84132 ASSAY OF SERUM POTASSIUM: CPT | Performed by: NURSE PRACTITIONER

## 2024-12-09 RX ORDER — ATORVASTATIN CALCIUM 20 MG/1
20 TABLET, FILM COATED ORAL AT BEDTIME
Status: DISCONTINUED | OUTPATIENT
Start: 2024-12-09 | End: 2024-12-10 | Stop reason: HOSPADM

## 2024-12-09 RX ORDER — POLYETHYLENE GLYCOL 3350 17 G/17G
17 POWDER, FOR SOLUTION ORAL 2 TIMES DAILY
Status: DISCONTINUED | OUTPATIENT
Start: 2024-12-09 | End: 2024-12-10 | Stop reason: HOSPADM

## 2024-12-09 RX ORDER — SODIUM CHLORIDE AND POTASSIUM CHLORIDE 150; 900 MG/100ML; MG/100ML
INJECTION, SOLUTION INTRAVENOUS CONTINUOUS
Status: DISCONTINUED | OUTPATIENT
Start: 2024-12-09 | End: 2024-12-10

## 2024-12-09 RX ORDER — BISACODYL 10 MG
10 SUPPOSITORY, RECTAL RECTAL ONCE
Status: COMPLETED | OUTPATIENT
Start: 2024-12-09 | End: 2024-12-09

## 2024-12-09 RX ORDER — POTASSIUM CHLORIDE 1500 MG/1
20 TABLET, EXTENDED RELEASE ORAL ONCE
Status: COMPLETED | OUTPATIENT
Start: 2024-12-09 | End: 2024-12-09

## 2024-12-09 RX ORDER — WARFARIN SODIUM 7.5 MG/1
7.5 TABLET ORAL ONCE
Status: COMPLETED | OUTPATIENT
Start: 2024-12-09 | End: 2024-12-09

## 2024-12-09 RX ORDER — WARFARIN SODIUM 7.5 MG/1
7.5 TABLET ORAL
Status: DISCONTINUED | OUTPATIENT
Start: 2024-12-09 | End: 2024-12-09

## 2024-12-09 RX ADMIN — GABAPENTIN 200 MG: 100 CAPSULE ORAL at 14:14

## 2024-12-09 RX ADMIN — POTASSIUM CHLORIDE AND SODIUM CHLORIDE: 900; 150 INJECTION, SOLUTION INTRAVENOUS at 23:05

## 2024-12-09 RX ADMIN — ACETAMINOPHEN 975 MG: 325 TABLET, FILM COATED ORAL at 22:08

## 2024-12-09 RX ADMIN — PANTOPRAZOLE SODIUM 40 MG: 40 INJECTION, POWDER, FOR SOLUTION INTRAVENOUS at 19:44

## 2024-12-09 RX ADMIN — METOPROLOL TARTRATE 25 MG: 25 TABLET, FILM COATED ORAL at 07:46

## 2024-12-09 RX ADMIN — ACETAMINOPHEN 975 MG: 325 TABLET, FILM COATED ORAL at 07:46

## 2024-12-09 RX ADMIN — POTASSIUM CHLORIDE AND SODIUM CHLORIDE: 900; 150 INJECTION, SOLUTION INTRAVENOUS at 10:43

## 2024-12-09 RX ADMIN — METOCLOPRAMIDE 5 MG: 5 TABLET ORAL at 14:14

## 2024-12-09 RX ADMIN — SUCRALFATE 1 G: 1 TABLET ORAL at 12:37

## 2024-12-09 RX ADMIN — ACETAMINOPHEN 975 MG: 325 TABLET, FILM COATED ORAL at 14:14

## 2024-12-09 RX ADMIN — SUCRALFATE 1 G: 1 TABLET ORAL at 17:16

## 2024-12-09 RX ADMIN — PANTOPRAZOLE SODIUM 40 MG: 40 INJECTION, POWDER, FOR SOLUTION INTRAVENOUS at 07:47

## 2024-12-09 RX ADMIN — AMLODIPINE BESYLATE 5 MG: 5 TABLET ORAL at 07:47

## 2024-12-09 RX ADMIN — POTASSIUM CHLORIDE 20 MEQ: 1500 TABLET, EXTENDED RELEASE ORAL at 12:37

## 2024-12-09 RX ADMIN — METOPROLOL TARTRATE 25 MG: 25 TABLET, FILM COATED ORAL at 22:08

## 2024-12-09 RX ADMIN — GABAPENTIN 200 MG: 100 CAPSULE ORAL at 07:47

## 2024-12-09 RX ADMIN — METOCLOPRAMIDE 5 MG: 5 TABLET ORAL at 07:46

## 2024-12-09 RX ADMIN — GABAPENTIN 200 MG: 100 CAPSULE ORAL at 22:08

## 2024-12-09 RX ADMIN — POLYETHYLENE GLYCOL 3350 17 G: 17 POWDER, FOR SOLUTION ORAL at 22:07

## 2024-12-09 RX ADMIN — QUETIAPINE FUMARATE 25 MG: 25 TABLET ORAL at 22:08

## 2024-12-09 RX ADMIN — ATORVASTATIN CALCIUM 20 MG: 20 TABLET, FILM COATED ORAL at 22:08

## 2024-12-09 RX ADMIN — METOCLOPRAMIDE 5 MG: 5 TABLET ORAL at 22:08

## 2024-12-09 RX ADMIN — SUCRALFATE 1 G: 1 TABLET ORAL at 19:46

## 2024-12-09 RX ADMIN — CITALOPRAM HYDROBROMIDE 40 MG: 40 TABLET ORAL at 07:47

## 2024-12-09 RX ADMIN — WARFARIN SODIUM 7.5 MG: 7.5 TABLET ORAL at 14:14

## 2024-12-09 RX ADMIN — SUCRALFATE 1 G: 1 TABLET ORAL at 07:47

## 2024-12-09 ASSESSMENT — ACTIVITIES OF DAILY LIVING (ADL)
ADLS_ACUITY_SCORE: 59
ADLS_ACUITY_SCORE: 60
ADLS_ACUITY_SCORE: 59
ADLS_ACUITY_SCORE: 59
ADLS_ACUITY_SCORE: 60
ADLS_ACUITY_SCORE: 60
ADLS_ACUITY_SCORE: 64
ADLS_ACUITY_SCORE: 59
ADLS_ACUITY_SCORE: 60
ADLS_ACUITY_SCORE: 59
ADLS_ACUITY_SCORE: 59

## 2024-12-09 NOTE — PROGRESS NOTES
Observation goals  PRIOR TO DISCHARGE        Comments:   -diagnostic tests and consults completed and resulted: Not met- GI consult pending  -vital signs normal or at patient baseline: met  -tolerating oral intake to maintain hydration: not met  -adequate pain control on oral analgesics: Met  -returns to baseline functional status: Not met  -safe disposition plan has been identified: Not met  Nurse to notify provider when observation goals have been met and patient is ready for discharge.

## 2024-12-09 NOTE — PROGRESS NOTES
[facility]    Medicine Progress Note - Hospitalist Service    Date of Admission:  12/6/2024    Assessment & Plan   Grace Jorgensen is a 74 year old female with below PMHx including recent admission from 12/2/24-12/4/24 for dehydration related to progressive N/V after her gastric neurostimulator settings were adjusted 3 weeks prior who re-presented 12/6/24 for further evaluation of R sided abdominal pain that developed on day of discharge and has persisted with associated nausea and general poor oral intake.      R Sided Abdominal Pain, Intractable Nausea and Vomiting  Gastroparesis s/p Gastric Neuro-stimulator Placement (L abdominal wall)  Cholelithiasis and Borderline Gallbladder Distention  GERD  Hiatal hernia, small to moderate:  Constipation, has a BM 2-3 times a week  *Three weeks PTA had battery and settings changed with progression of sx. Note during last admission, [facility] GI was consulted and pt's stimulator settings were adjusted again, pt was discharged on Reglan.  In the ED, WBC 6.3, lactic 0.9, hepatic panel unremarkable. UA not indicative of infection.   *CT with Cholelithiasis and borderline gallbladder distention. No  additional signs for cholecystitis. Reviewed CT C/A/P from 12/1/24, at that time note fluid filled esophagus of uncertain significance. No comment of cholelithiasis or GB distention at that time nor comment of renal pathology as below.   *RUQ US: Distended gallbladder. A 1.4 cm echogenic nonshadowing focus could represent a polyp, sludge ball or nonshadowing stone. No gallbladder wall thickening, pericholecystic fluid or tenderness over the  *HIDA Scan 12/8/24 no evidence of acute cholecystitis    - Analgesic regimen                - scheduled Tylenol               - PRN Oxycodone 2.5mg                - no longer on IV Dilaudid as son feels like this may causing her to be confused                - Held the higher dose of PRN Oxycodone for same reason                 - Recommend avoiding narcotics if possible given risk of delirium       - PRN Antiemetics   - Continue PPI (IV), carafate scheduled, simethicone as needed  - Schedule Reglan TID   - General Surgery conditions appreciated, I personally discussed management with the [facility] 12/9/2024 (no acute surgical intervention warranted) agree with scheduled Dulcolax, appears patient refused  - [facility] consulted to see if stimulator needs to be assessed   -Scheduled MiraLAX twice daily     Likely Component of Delirium   Mild Cognitive Impairment  *Has intermittently been agitated both with staff and family, required IM Zyprexa and verbal de-escalation on 12/7/2024  - Delirium precautions pudding but not limited to mobilization (encouraged son to bring in patient's walker which she prefers over the hospital use walker), frequent orientation, preservation of sleep-wake cycle, provision of sensory aids etc.   - Continue low dose Seroquel 25mg at bedtime   - PRN Zyprexa for agitation   - Sitter as needed   - PT consulted recommended either TCU versus home with home care PT suspect patient may prefer the latter     Hypochloremic  resolved  hyponatremia,recurrent but not as severe  CHI, worsening, +contrast exposure on 12/1 and 12/6/24 and recent poor p.o. intake  Hypokalemia  - I&Os q4h, monitor for signs of volume overload   -start IVF w/ 0.9 saline with 20 mill equivalents potassium chloride for 24h  -UA now  -Continue to hold Lasix and ACEi  -BMP in the a.m. 12/10/2024  - If renal function worsening can re-image on 12/10/2024 with renal ultrasound     CHI worsening, see above  Microscopic Hematuria from 12/6/24 UA  CT abdomen pelvis 12/6/2024 w/ nonobstructing right lower pole 3 mm calculus (4/56). Delayed right nephrogram. No significant collecting system dilatation or convincing ureteral calculus.  *No flank pain, urinary sx. Note UA from 12/1 was straight cathed, ? Trauma causing the microscopic hematuria as large blood  noted at that time.   - Monitor clinically   - Repeat UA outpatient, if microscopic hematuria persists or pt develops flank pain or urinary sx will consult Urology, hold for now     Paroxysmal Atrial Fibrillation on chronic anticoagulation with Coumadin  Labile INR  INR supratherapeutic on admission now subtherapeutic 12/09/24, no reversal just doses held, no active signs of bleeding. Hgb stable.   -Warfarin per pharmacy consulted, allow INR to drift back up  - Continue PTA Lopressor BID      Normocytic Anemia, chronic, baseline hgb 11-12 g/dL but improved from January 2024 when she was as low as 6.9-8.2 g/dL  - Monitor prn    Hepatic steatosis  - Noted on admission CT.      HFpEF, not in acute exacerbation  Severe coronary artery calcification per CT 12/1/24  Diffuse aoroiliac atheromatous disease per CT 12/1/24:   - Echo 12/2/24 with preserved EF, no RWMA, no significant valve disease, grade 1 or early diastolic dysfunciton.   -I/Os as above  - Daily weights  -2 g sodium diet would be ideal, patient does not like the hospital food at baseline, she may have home food     T2DM, non-insulin dependent, controlled: A1C 6.7 last admission.   Recent Labs   Lab 12/09/24  1237 12/09/24  0917 12/09/24  0647 12/09/24  0156 12/08/24  2055 12/08/24  1711   * 96 98 104* 108* 125*     - Hold metformin while in hospital and w/ CHI  -Mealtime correction medium sliding scale insulin ordered in lieu of metformin  - gluc checks AC/HS  - Monitor for hypoglycemia      Hypertension  - Continue to hold Lisinopril with CHI  - Continue PTA Norvasc and Lopressor as above both have hold parameters  - PRN IV Hydralazin and Labetolol      Hyperlipidemia  -Can resume statin now that she is inpatient as of 12/9/2024     Depression  - Continue PTA Celexa      Cerebellar ataxia  Hx of CVA  - Uses a walker for ambulatory assistance patient prefers her own, son may bring this in           Diet: Regular Diet Adult    DVT Prophylaxis:  "Warfarin  Waterman Catheter: Not present  Lines: None     Cardiac Monitoring: ACTIVE order. Indication: uncontrolled HTN  Code Status: Full Code      Clinically Significant Risk Factors Present on Admission        # Hypokalemia: Lowest K = 3.3 mmol/L in last 2 days, will replace as needed  # Hyponatremia: Lowest Na = 134 mmol/L in last 2 days, will monitor as appropriate          # Drug Induced Coagulation Defect: home medication list includes an anticoagulant medication   # Acute Kidney Injury, unspecified: based on a >150% or 0.3 mg/dL increase in last creatinine compared to past 90 day average, will monitor renal function  # Hypertension: Noted on problem list           # Obesity: Estimated body mass index is 30 kg/m  as calculated from the following:    Height as of this encounter: 1.58 m (5' 2.21\").    Weight as of this encounter: 74.9 kg (165 lb 2 oz).            --increases all cause morbidity and mortality  -OP follow up re: weight loss & lifestyle changes     Social Drivers of Health    Depression: At risk (11/12/2024)    Received from [facility]    PHQ-2     PHQ-2 Score: 3   Housing Stability: High Risk (12/6/2024)    Housing Stability     Do you have housing? : No     Are you worried about losing your housing?: No   Tobacco Use: Medium Risk (11/12/2024)    Received from [facility]    Patient History     Smoking Tobacco Use: Former     Smokeless Tobacco Use: Never     Passive Exposure: Never   Interpersonal Safety: High Risk (12/4/2024)    Interpersonal Safety     Do you feel physically and emotionally safe where you currently live?: No     Within the past 12 months, have you been hit, slapped, kicked or otherwise physically hurt by someone?: No     Within the past 12 months, have you been humiliated or emotionally abused in other ways by your partner or ex-partner?: No          Disposition Plan     Medically Ready for Discharge: Anticipated Tomorrow 1-2 days potentially as early as 12/10/2024 pending " improvement in renal function and tolerance of p.o.       The patient's care was discussed with the Attending Physician, Dr. Triplett, Bedside Nurse, Patient, and Patient's Family.    SALOMÓN Castaneda [facility]  Hospitalist Service  [facility]  Securely message with [employer] (more info)  Text page via [facility] Paging/Directory   ______________________________________________________________________    Interval History   Patient is eager to go home feels her pain has not been adequately treated is still 8/10 in severity she is not using any opioid analgesics..  She does not like our food, burned her mouth on something on her breakfast tray.  She reports she is taking adequate p.o.  She is insistent on using her own walker.    Physical Exam   Vital Signs: Temp: 97.9  F (36.6  C) Temp src: Oral BP: 131/54 Pulse: 56   Resp: 18 SpO2: 92 % O2 Device: None (Room air)    Weight: 165 lbs 1.99 oz    Constitutional: vs as per EMR  General:  adult pt lying in bed without acute distress  Neuro: +follows commands wiggle toes and show 2 fingers bilat, face symmetric, tongue midline, speech fluent  Eyes pupils equal round 3mm briskly reactive bilat, sclera nonicteric, noninjected, conjunctiva pink,  Head, ENT & mouth: NC/AT,  mouth moist oral mucosa  Neck: supple  CV S1S2 no murmurs  resp: CTAB upper lobes  gi:normoactive bowel sounds, soft, nontender, nondisteded  Ext: no edema or mottling  Skin: no rashes on exposed skin  Musculoskeletal no bony joint deformities      Medical Decision Making       40 MINUTES SPENT BY ME on the date of service doing chart review, history, exam, documentation & further activities per the note.      Data     I have personally reviewed the following data over the past 24 hrs:    N/A  \   N/A   / N/A     134 (L) 100 25.5 (H) /  116 (H)   3.3 (L) 23 1.58 (H) \     INR:  1.79 (H) PTT:  N/A   D-dimer:  N/A Fibrinogen:  N/A       Imaging results reviewed over the past 24 hrs:   No results  found for this or any previous visit (from the past 24 hours).

## 2024-12-09 NOTE — UTILIZATION REVIEW
"  Admission Status; Secondary Review Determination         Under the authority of the Utilization Management Committee, the utilization review process indicated a secondary review on the above patient.  The review outcome is based on review of the medical records, discussions with staff, and applying clinical experience noted on the date of the review.        (xxx)      Inpatient Status Appropriate - This patient's medical care is consistent with medical management for inpatient care and reasonable inpatient medical practice.      () Observation Status Appropriate - This patient does not meet hospital inpatient criteria and is placed in observation status. If this patient's primary payer is Medicare and was admitted as an inpatient, Condition Code 44 should be used and patient status changed to \"observation\".   () Admission Status NOT Appropriate - This patient's medical care is not consistent with medical management for Inpatient or Observation Status.          RATIONALE FOR DETERMINATION     Grace Jorgensen is a 74 year old female with recent admission from 12/2/24-12/4/24 for dehydration related to progressive N/V after her gastric neurostimulator settings were adjusted 3 weeks prior who re-presented 12/6/24 for further evaluation of R sided abdominal pain that developed on day of discharge and has persisted with associated nausea and general poor oral intake. On 12/6 she was registered to observation for further evaluation and treatment.  Creatinine has been rising since admission (was 0.65 and is now 1.58). IVF initiated today.  She is also receiving IV PPI and IV antinausea medications. She has failed a trial of observation.  IP status is appropriate at this time.    The severity of illness, intensity of service provided, expected LOS and risk for adverse outcome make the care complex, high risk and appropriate for hospital admission.        The information on this document is developed by the utilization " review team in order for the business office to ensure compliance.  This only denotes the appropriateness of proper admission status and does not reflect the quality of care rendered.         The definitions of Inpatient Status and Observation Status used in making the determination above are those provided in the CMS Coverage Manual, Chapter 1 and Chapter 6, section 70.4.      Sincerely,     Diane Painter MD  Physician Advisor   Utilization Review/ Case Management  [facility].

## 2024-12-09 NOTE — PROGRESS NOTES
"DATE & TIME:12/8/24 [id number]  Cognitive Concerns/ Orientation:Alert &O x3; Disoriented to time  BEHAVIOR & AGGRESSION TOOL COLOR:Green, calm  ABNL VS/O2:VSS on RA  MOBILITY:Ax1 gb/w  PAIN MANAGMENT:denies' schedule medication given per MAR  DIET: Regular   BOWEL/BLADDER:Incontinent of bowel and bladder. No BM during the shift. Patient refused her 9 am suppository.  DRAIN/DEVICES: Left PIV infusing 0.9% & K+ 20 meq fluids at 100 ml/hr  TELEMETRY RHYTHM: NSR  TESTS/PROCEDURES:Bg: BG AC/, 104, 96, INR 2.17 & 1.79  D/C DATE:  Pending  OTHER IMPORTANT INFO: Nausea intermittent without any emesis or vomit. Schedule Reglan given per MAR. All other medications administered 30 minutes after antiemetic.    BP (!) 150/70 (BP Location: Left arm, Patient Position: Supine, Cuff Size: Adult Regular)   Pulse 87   Temp 98.1  F (36.7  C) (Oral)   Resp 18   Ht 1.58 m (5' 2.21\")   Wt 74.9 kg (165 lb 2 oz)   SpO2 98%   BMI 30.00 kg/m            "

## 2024-12-09 NOTE — PROGRESS NOTES
12/09/24 1200   Appointment Info   Signing Clinician's Name / Credentials (PT) Tamika Brady, PT, DPT   Living Environment   People in Home child(george), adult   Current Living Arrangements house   Home Accessibility stairs to enter home   Number of Stairs, Main Entrance 2   Stair Railings, Main Entrance none   Transportation Anticipated family or friend will provide   Living Environment Comments Patient lives in a house with her son.  There are 2 steps to enter.  All needs met on the main floor.  Patient has a tub shower but washes at the kitchen sink.   Self-Care   Usual Activity Tolerance moderate   Current Activity Tolerance moderate   Regular Exercise No   Equipment Currently Used at Home walker, rolling   Fall history within last six months no   Activity/Exercise/Self-Care Comment Patient reports independence with dressing and toileting, performs sponge baths at sink.  Patient reports ambulating with a 4WW, typically short distances.   General Information   Onset of Illness/Injury or Date of Surgery 12/06/24   Referring Physician Kenyatta Parker PA-C   Patient/Family Therapy Goals Statement (PT) Patient wants to discharge home.   Pertinent History of Current Problem (include personal factors and/or comorbidities that impact the POC) 74 year old female with below PMHx including recent admission from 12/2/24-12/4/24 for dehydration related to progressive N/V after her gastric neurostimulator settings were adjusted 3 weeks prior who re-presented 12/6/24 for further evaluation of R sided abdominal pain that developed on day of discharge and has persisted with associated nausea and general poor oral intake.   Existing Precautions/Restrictions fall   Cognition   Affect/Mental Status (Cognition) WFL   Orientation Status (Cognition) oriented to;person;situation   Follows Commands (Cognition) follows one-step commands;over 90% accuracy;delayed response/completion;increased processing time needed   Pain  Assessment   Patient Currently in Pain Yes, see Vital Sign flowsheet  (Abdominal discomfort)   Integumentary/Edema   Integumentary/Edema Comments Age-related skin changes   Posture    Posture Forward head position;Protracted shoulders   Range of Motion (ROM)   Range of Motion ROM is WFL   Strength (Manual Muscle Testing)   Strength (Manual Muscle Testing) Able to perform R SLR;Able to perform L SLR;Deficits observed during functional mobility   Bed Mobility   Comment, (Bed Mobility) SBA for supine > sit with HOB elevated and UE support on right bed rail. Requires increased time to perform bed mobility.   Transfers   Comment, (Transfers) CGA-minAx1 sit <> stand from bed, demonstrating unsafe hand placement pulling on walker.   Gait/Stairs (Locomotion)   Comment, (Gait/Stairs) CGA for 5 ft ambulation with 4WW, demonstrating slow gait speed and short, shuffling steps.  Walker positioned far ahead of body. Unsteady, fatigues quickly.   Balance   Balance Comments Impaired dynamic balance, uses walker.   Sensory Examination   Sensory Perception Comments Describes baseline BLE neuropathy   Clinical Impression   Criteria for Skilled Therapeutic Intervention Yes, treatment indicated   PT Diagnosis (PT) Impaired functional mobility   Influenced by the following impairments Abdominal pain, nausea, weakness, deconditioning, impaired balance, decreased activity tolerance   Functional limitations due to impairments Impaired independence with bed mobility, transfers, gait, and stairs   Clinical Presentation (PT Evaluation Complexity) evolving   Clinical Presentation Rationale Clinical judgement, PMH, social support   Clinical Decision Making (Complexity) moderate complexity   Planned Therapy Interventions (PT) balance training;bed mobility training;gait training;home exercise program;neuromuscular re-education;patient/family education;postural re-education;stair training;strengthening;transfer training;progressive  activity/exercise   Risk & Benefits of therapy have been explained evaluation/treatment results reviewed;care plan/treatment goals reviewed;risks/benefits reviewed;participants voiced agreement with care plan;participants included;patient   PT Total Evaluation Time   PT Eval, Moderate Complexity Minutes (37722) 10   Interventions   Interventions Quick Adds Gait Training   Therapeutic Activity   Therapeutic Activities: dynamic activities to improve functional performance Minutes (03910) 13   Symptoms Noted During/After Treatment Fatigue   Treatment Detail/Skilled Intervention Patient supine in bed, reading paper on arrival.  Patient denying nausea at time of session, eager to return home.  Patient needing encouragement for PT session.  Very slow to initiate bed mobility.  SBA for sitting balance at EOB.  Patient reporting she uses a 4WW but unable to demonstrate safe use of brakes during transfers.  She performs sit > stand from bed with CGA-minAx1 and unsafe hand placement pulling on walker.  Needing cues to unbrake 4WW once standing.  Patient needing minAx1 for stand > sit at end of session as LEs very fatigued.  Unable to complete sit > supine without modA to assist LEs into bed.  Left with call light in reach, RN present for blood glucose check.   Gait Training   Gait Training Minutes (24701) 8   Symptoms Noted During/After Treatment (Gait Training) fatigue   Treatment Detail/Skilled Intervention Patient refusing to ambulate in kelley, room set-up for OOB mobility.  Patient states she uses a 4WW, retrieved for session.  Patient reports her living room and bedroom are close to bathroom at home.  Patient only tolerating 30 ft ambulation with 4WW today, becoming very unsteady and urgently needing to return to sitting on bed due to fatigue.  Patient ambulates slowly with short, shuffling steps.  Despite repeated cues, she positions walker too far from body further leading to forward flexed position.  Unsteady and at  high falls risk with LE fatigue.   Distance in Feet 5' eval + 25' treatment   [place] Level (Gait Training) minimum assist (75% patient effort)   Assistive Device (Gait Training) rolling walker   PT Discharge Planning   PT Plan Repeated sit <> stands, gait with 4WW and wc follow, LE strengthening   PT Discharge Recommendation (DC Rec) Transitional Care Facility;home with assist;home with home care physical therapy   PT Rationale for DC Rec Patient is significantly below her reported IND/mod I mobility baseline, needing Ax1 for bed mobility, transfers, and 30 ft ambulation today.  Patient reports she uses a 4WW at home but fails to demonstrate safe use of brakes and positions too far from body while ambulating.  Patient with very limited activity tolerance, unsteady and at increased falls risk after 30 ft ambulation due to LE fatigue.  Recommend TCU to promote safety and independence with mobility unless patient is able to arrange increased assistance from family.  She reports she lives with her son but he does work during the day.  With increased assist, concern discharge home with HHPT to address remaining strength, balance, and endurance deficits.   PT Brief overview of current status Ax1 walker, wc follow for out of room. Goals of therapy will be to address safe mobility and make recs for d/c to next level of care. Pt and RN will continue to follow all falls risk precautions as documented by RN staff while hospitalized.   Physical Therapy Time and Intention   Timed Code Treatment Minutes 21   Total Session Time (sum of timed and untimed services) 31

## 2024-12-09 NOTE — CONSULTS
Care Management Initial Consult    General Information  Assessment completed with: blanca Hull  Type of CM/SW Visit: Initial Assessment    Primary Care Provider verified and updated as needed: Yes   Readmission within the last 30 days:        Reason for Consult: discharge planning  Advance Care Planning: Advance Care Planning Reviewed: no concerns identified          Communication Assessment  Patient's communication style: spoken language (English or Bilingual)    Hearing Difficulty or Deaf: no   Wear Glasses or Blind: yes    Cognitive  Cognitive/Neuro/Behavioral: .WDL except, orientation  Level of Consciousness: alert, confused  Arousal Level: opens eyes spontaneously  Orientation: disoriented to, time  Mood/Behavior: agitated  Best Language: 0 - No aphasia  Speech: clear, spontaneous, logical    Living Environment:   People in home: child(george), adult     Current living Arrangements: house      Able to return to prior arrangements: yes       Family/Social Support:  Care provided by: child(george), self  Provides care for: no one, unable/limited ability to care for self  Marital Status: Single  Support system: Children          Description of Support System: Involved    Support Assessment: Adequate family and caregiver support    Current Resources:   Patient receiving home care services:  ([facility] was planning to start home care with patient but will need a new referral)        Community Resources:    Equipment currently used at home: walker, rolling  Supplies currently used at home:      Employment/Financial:  Employment Status: retired        Financial Concerns:             Does the patient's insurance plan have a 3 day qualifying hospital stay waiver?  Yes     Which insurance plan 3 day waiver is available? Alternative insurance waiver    Will the waiver be used for post-acute placement? No    Lifestyle & Psychosocial Needs:  Social Drivers of Health     Food Insecurity: Low Risk  (12/6/2024)    Food  Insecurity     Within the past 12 months, did you worry that your food would run out before you got money to buy more?: No     Within the past 12 months, did the food you bought just not last and you didn t have money to get more?: No   Depression: At risk (11/12/2024)    Received from [facility]    PHQ-2     PHQ-2 Score: 3   Housing Stability: High Risk (12/6/2024)    Housing Stability     Do you have housing? : No     Are you worried about losing your housing?: No   Tobacco Use: Medium Risk (11/12/2024)    Received from [facility]    Patient History     Smoking Tobacco Use: Former     Smokeless Tobacco Use: Never     Passive Exposure: Never   Financial Resource Strain: Low Risk  (12/6/2024)    Financial Resource Strain     Within the past 12 months, have you or your family members you live with been unable to get utilities (heat, electricity) when it was really needed?: No   Alcohol Use: Not on file   Transportation Needs: Low Risk  (12/6/2024)    Transportation Needs     Within the past 12 months, has lack of transportation kept you from medical appointments, getting your medicines, non-medical meetings or appointments, work, or from getting things that you need?: No   Physical Activity: Not on file   Interpersonal Safety: High Risk (12/4/2024)    Interpersonal Safety     Do you feel physically and emotionally safe where you currently live?: No     Within the past 12 months, have you been hit, slapped, kicked or otherwise physically hurt by someone?: No     Within the past 12 months, have you been humiliated or emotionally abused in other ways by your partner or ex-partner?: No   Stress: Not on file   Social Connections: Not on file   Health Literacy: Not on file       Functional Status:  Prior to admission patient needed assistance:   Dependent ADLs:: Ambulation-walker  Dependent IADLs:: Transportation, Medication Management, Laundry, Shopping       Mental Health Status:          Chemical Dependency  Status:                Values/Beliefs:  Spiritual, Cultural Beliefs, Jain Practices, Values that affect care:                 Discussed  Partnership in Safe Discharge Planning  document with patient/family: No    Additional Information:  Patient was admitted on 12/6/24 for further evaluation of R sided abdominal pain that developed on day of discharge and has persisted with associated nausea and general poor oral intake, per H&P. Patient lives in a house with son, Anand. Pt states son lives in house with her, mostly stays on bottom level but will come up to main floor to assist pt, sleep, eat, etc. Pt states son works out of the home during the day, arrives back home around 1600. Pt states they sleep most of the day, until about 1400. Discussed PT recommendation for TCU vs home with assist. Pt states they are planning to go home at discharge, do not want to go to a facility. Informed pt that this would be a short-term rehab stay to transition back home, pt states they moved well with PT today and they feel comfortable using their walker at home, no concerns with getting around, going to bathroom, etc. Pt states once they wake up for the day, son gets home shortly after anyway. Discussed alternative option for home care, pt is in agreement with this plan. Sent home care referral for RN/PT/OT.    MONSTER Reynolds  Social Work  [facility]

## 2024-12-09 NOTE — DISCHARGE INSTRUCTIONS
Very small bilateral inguinal hernias were noted incidentally during your hospital stay. There was some mild tenderness to palpation over your right groin, but this hernia is small and not causing any issues with your bowels. We do not recommend surgery for these right now unless they become bothersome to you. If you would like to discuss this further, please call our office at [phone] to schedule an appointment with one of our Acute Care surgeons for consultation. Our clinic's name is [facility]. The address is [address], [address], [place], [place], [address]

## 2024-12-09 NOTE — PROGRESS NOTES
General Surgery Progress Note    Admission Date: 12/6/2024  Today's Date: 12/9/2024         Assessment:      A/P Grace Jorgensen is a 74 year old female with gastroparesis and recent stimulator battery change admitted on 12/6/2024 for right sided abdominal pain. Ultrasound shows a distended gall bladder with 1.4 cm non-shadowing area. NO gallbladder wall thickening, no surrounding fluid, no RUQ abdominal pain on exam. HIDA with normal radionuclide activity, no evidence of obstruction or cholecystitis.          Plan:   - No evidence of biliary system being the source for the patient's discomfort, nausea, and poor PO intake  - Patient currently feels hungry and is ordering breakfast now  - Gastroenterology has been consulted, agree with this. Defer to their recommendations for continued workup and management  - Some mild right groin pain on exam, no hernia palpated. CT with tiny bilateral inguinal hernias, do not contain bowel. This is unrelated to her current symptoms. Likely would not pursue elective hernia repair as this has never bothered her in the past. However, I will provide our contact information should they wish to discuss further in the outpatient setting. CT also reviewed by Dr. Mccall.    - Added dulcolax suppository for today. Consider PO bowel regimen once able to tolerate PO. Monitor bowel function to avoid obstipation, no BM charted since admission  - Discussed with hospitalist. Continued medical management  - General surgery team will sign off. Please call if any questions.        Interval History:   Afebrile, vitals stable on room air with mild hypertension. Grace is feeling hungry today. No family at bedside during my visit. Still struggling with nausea and poor oral intake. One episode of emesis yesterday. No BM charted since admission.           Physical Exam:   BP (!) 150/70 (BP Location: Left arm, Patient Position: Supine, Cuff Size: Adult Regular)   Pulse 87   Temp 98.1  F (36.7  C)  "(Oral)   Resp 18   Ht 1.58 m (5' 2.21\")   Wt 74.9 kg (165 lb 2 oz)   SpO2 98%   BMI 30.00 kg/m    I/O last 3 completed shifts:  In: 498.75 [I.V.:498.75]  Out: 400 [Urine:400]  General: NAD, awake and alert  Respiratory: non-labored breathing   Abdomen: nondistended, soft, completely nontender to light and deep palpation across upper abdomen including RUQ. Does have some mild right groin discomfort on exam, no hernia palpated.      LABS:  Recent Labs   Lab Test 12/07/24  0848 12/06/24  0922 12/04/24  0843   WBC 7.5 6.3 11.8*   HGB 11.3* 11.2* 10.0*   MCV 88 84 85    405 342      Recent Labs   Lab Test 12/09/24  0647 12/08/24  1518 12/07/24  1829 12/07/24  0848   POTASSIUM 3.3* 3.8 3.5 3.2*   CHLORIDE 100 100  --  100   CO2 23 21*  --  19*   BUN 25.5* 19.1  --  14.0   CR 1.58* 1.19*  --  0.85   ANIONGAP 11 14  --  12      Recent Labs   Lab Test 12/07/24  0848 12/06/24  0922 12/02/24  0534   ALBUMIN 3.5 3.9 4.1   BILITOTAL 0.3 0.3 0.4   ALT 12 15 20   AST 15 23 38   ALKPHOS 65 72 79      Recent Labs   Lab Test 12/06/24  0922 12/01/24  1956   LIPASE 20 20     Recent Labs   Lab Test 12/06/24  1035 12/01/24  2151   PROTEIN 200* 600*       -------------------------------    Time spent: 45 minutes total time spent on the date of this encounter doing: chart review, review of test results, patient visit/obtaining a history, physical exam, education, counseling, developing plan of care, consulting with other providers, and documenting.       Hawa Stevens PA-C  [facility]  [phone]      "

## 2024-12-09 NOTE — PLAN OF CARE
Goal Outcome Evaluation:    PRIMARY Concern: R-sided abdominal pain.   SAFETY RISK Concerns (fall risk, behaviors, etc.): Fall risk      Aggression Tool Color: Green  Isolation/Type: N/A  Tests/Procedures for NEXT shift: BG monitoring  Consults? (Pending/following, signed-off?) GI consult, PT, SW/CC following   Where is patient from? (Home, TCU, etc.): Pt lives w/ son, Anand. She lives in the upstairs of the home and he lives downstairs  Other Important info for NEXT shift: Can be anxious and forgetful, easily reassured w/ reminders of care plan and pain plan. Keep white board updated w/ pain meds  Anticipated DC date & active delays: Pending consults and clinical progress  _____________________________________________________________________________  SUMMARY NOTE:  Orientation/Cognitive: A&Ox4, forgetful.   Observation Goals (Met/ Not Met): NOT MET  Mobility Level/Assist Equipment: Ax1 GB/walker  Antibiotics & Plan (IV/po, length of tx left): N/A  Pain Management: R sided abdominal pain controlled with warm pack.  Tele/VS/O2: VSS on RA. Tele: SR. Hx of a-fib  ABNL Lab/BG: , K+ 3.3 recheck at 1600  Diet: Reg  Bowel/Bladder: Purewick per pt request, small output  Skin Concerns: Intact.  Drains/Devices: Left PIV infusing 0.9% & K+ 20 meq fluids at 100 ml/hr  Patient Stated Goal for Today: pain control

## 2024-12-10 VITALS
HEIGHT: 62 IN | SYSTOLIC BLOOD PRESSURE: 149 MMHG | WEIGHT: 168.21 LBS | OXYGEN SATURATION: 93 % | BODY MASS INDEX: 30.95 KG/M2 | TEMPERATURE: 98.1 F | DIASTOLIC BLOOD PRESSURE: 73 MMHG | RESPIRATION RATE: 20 BRPM | HEART RATE: 65 BPM

## 2024-12-10 PROBLEM — I48.20 CHRONIC ATRIAL FIBRILLATION (H): Status: ACTIVE | Noted: 2024-12-10

## 2024-12-10 LAB
ANION GAP SERPL CALCULATED.3IONS-SCNC: 5 MMOL/L (ref 7–15)
BUN SERPL-MCNC: 21.8 MG/DL (ref 8–23)
CALCIUM SERPL-MCNC: 7.4 MG/DL (ref 8.8–10.4)
CHLORIDE SERPL-SCNC: 114 MMOL/L (ref 98–107)
CREAT SERPL-MCNC: 0.94 MG/DL (ref 0.51–0.95)
EGFRCR SERPLBLD CKD-EPI 2021: 63 ML/MIN/1.73M2
GLUCOSE BLDC GLUCOMTR-MCNC: 105 MG/DL (ref 70–99)
GLUCOSE BLDC GLUCOMTR-MCNC: 111 MG/DL (ref 70–99)
GLUCOSE SERPL-MCNC: 94 MG/DL (ref 70–99)
HCO3 SERPL-SCNC: 20 MMOL/L (ref 22–29)
HOLD SPECIMEN: NORMAL
INR PPP: 2.01 (ref 0.85–1.15)
POTASSIUM SERPL-SCNC: 3.7 MMOL/L (ref 3.4–5.3)
POTASSIUM SERPL-SCNC: 3.8 MMOL/L (ref 3.4–5.3)
POTASSIUM SERPL-SCNC: 5.8 MMOL/L (ref 3.4–5.3)
SODIUM SERPL-SCNC: 139 MMOL/L (ref 135–145)

## 2024-12-10 PROCEDURE — 85610 PROTHROMBIN TIME: CPT | Performed by: PHYSICIAN ASSISTANT

## 2024-12-10 PROCEDURE — 250N000013 HC RX MED GY IP 250 OP 250 PS 637: Performed by: STUDENT IN AN ORGANIZED HEALTH CARE EDUCATION/TRAINING PROGRAM

## 2024-12-10 PROCEDURE — 99239 HOSP IP/OBS DSCHRG MGMT >30: CPT

## 2024-12-10 PROCEDURE — 87529 HSV DNA AMP PROBE: CPT

## 2024-12-10 PROCEDURE — 84132 ASSAY OF SERUM POTASSIUM: CPT

## 2024-12-10 PROCEDURE — 250N000009 HC RX 250: Performed by: PHYSICIAN ASSISTANT

## 2024-12-10 PROCEDURE — 258N000003 HC RX IP 258 OP 636

## 2024-12-10 PROCEDURE — 36415 COLL VENOUS BLD VENIPUNCTURE: CPT

## 2024-12-10 PROCEDURE — 96375 TX/PRO/DX INJ NEW DRUG ADDON: CPT

## 2024-12-10 PROCEDURE — 250N000013 HC RX MED GY IP 250 OP 250 PS 637: Performed by: NURSE PRACTITIONER

## 2024-12-10 PROCEDURE — 80048 BASIC METABOLIC PNL TOTAL CA: CPT | Performed by: NURSE PRACTITIONER

## 2024-12-10 PROCEDURE — 36415 COLL VENOUS BLD VENIPUNCTURE: CPT | Performed by: NURSE PRACTITIONER

## 2024-12-10 PROCEDURE — 250N000013 HC RX MED GY IP 250 OP 250 PS 637: Performed by: PHYSICIAN ASSISTANT

## 2024-12-10 PROCEDURE — 82435 ASSAY OF BLOOD CHLORIDE: CPT | Performed by: NURSE PRACTITIONER

## 2024-12-10 RX ORDER — ACYCLOVIR 400 MG/1
400 TABLET ORAL EVERY 8 HOURS
Qty: 15 TABLET | Refills: 0 | Status: SHIPPED | OUTPATIENT
Start: 2024-12-10 | End: 2024-12-15

## 2024-12-10 RX ORDER — SODIUM CHLORIDE 9 MG/ML
INJECTION, SOLUTION INTRAVENOUS CONTINUOUS
Status: DISCONTINUED | OUTPATIENT
Start: 2024-12-10 | End: 2024-12-10

## 2024-12-10 RX ORDER — POLYETHYLENE GLYCOL 3350 17 G/17G
17 POWDER, FOR SOLUTION ORAL 2 TIMES DAILY
Qty: 30 PACKET | Refills: 0 | Status: SHIPPED | OUTPATIENT
Start: 2024-12-10

## 2024-12-10 RX ORDER — OXYCODONE HYDROCHLORIDE 5 MG/1
2.5 TABLET ORAL EVERY 8 HOURS PRN
Qty: 8 TABLET | Refills: 0 | Status: SHIPPED | OUTPATIENT
Start: 2024-12-10

## 2024-12-10 RX ORDER — WARFARIN SODIUM 4 MG/1
4 TABLET ORAL
Status: DISCONTINUED | OUTPATIENT
Start: 2024-12-10 | End: 2024-12-10 | Stop reason: HOSPADM

## 2024-12-10 RX ORDER — WARFARIN SODIUM 5 MG/1
5 TABLET ORAL DAILY
Qty: 2 TABLET | Refills: 0 | Status: SHIPPED | OUTPATIENT
Start: 2024-12-10

## 2024-12-10 RX ADMIN — POLYETHYLENE GLYCOL 3350 17 G: 17 POWDER, FOR SOLUTION ORAL at 08:18

## 2024-12-10 RX ADMIN — METOPROLOL TARTRATE 25 MG: 25 TABLET, FILM COATED ORAL at 08:19

## 2024-12-10 RX ADMIN — CITALOPRAM HYDROBROMIDE 40 MG: 40 TABLET ORAL at 08:18

## 2024-12-10 RX ADMIN — SUCRALFATE 1 G: 1 TABLET ORAL at 08:19

## 2024-12-10 RX ADMIN — AMLODIPINE BESYLATE 5 MG: 5 TABLET ORAL at 08:19

## 2024-12-10 RX ADMIN — METOCLOPRAMIDE 5 MG: 5 TABLET ORAL at 13:54

## 2024-12-10 RX ADMIN — GABAPENTIN 200 MG: 100 CAPSULE ORAL at 08:18

## 2024-12-10 RX ADMIN — ACETAMINOPHEN 975 MG: 325 TABLET, FILM COATED ORAL at 13:53

## 2024-12-10 RX ADMIN — SODIUM CHLORIDE: 9 INJECTION, SOLUTION INTRAVENOUS at 09:42

## 2024-12-10 RX ADMIN — SODIUM CHLORIDE 500 ML: 9 INJECTION, SOLUTION INTRAVENOUS at 08:34

## 2024-12-10 RX ADMIN — METOCLOPRAMIDE 5 MG: 5 TABLET ORAL at 08:18

## 2024-12-10 RX ADMIN — SUCRALFATE 1 G: 1 TABLET ORAL at 13:53

## 2024-12-10 RX ADMIN — PANTOPRAZOLE SODIUM 40 MG: 40 INJECTION, POWDER, FOR SOLUTION INTRAVENOUS at 08:18

## 2024-12-10 RX ADMIN — ACETAMINOPHEN 975 MG: 325 TABLET, FILM COATED ORAL at 08:19

## 2024-12-10 RX ADMIN — GABAPENTIN 200 MG: 100 CAPSULE ORAL at 13:53

## 2024-12-10 ASSESSMENT — ACTIVITIES OF DAILY LIVING (ADL)
ADLS_ACUITY_SCORE: 64
ADLS_ACUITY_SCORE: 64
ADLS_ACUITY_SCORE: 61
ADLS_ACUITY_SCORE: 61
ADLS_ACUITY_SCORE: 64
ADLS_ACUITY_SCORE: 64
ADLS_ACUITY_SCORE: 57
ADLS_ACUITY_SCORE: 64
ADLS_ACUITY_SCORE: 57
ADLS_ACUITY_SCORE: 61
ADLS_ACUITY_SCORE: 64
ADLS_ACUITY_SCORE: 64
ADLS_ACUITY_SCORE: 61

## 2024-12-10 NOTE — PROGRESS NOTES
Patient has been discharged December 10, 2024 3:55  PM. Discharge instructions and education reviewed, medication schedule and follow up appts discussed. Pt had no questions. PIV removed. All belongings sent with pt.

## 2024-12-10 NOTE — PROGRESS NOTES
PRIMARY Concern: R-sided abdominal pain.   SAFETY RISK Concerns (fall risk, behaviors, etc.): Fall risk      Aggression Tool Color: Green  Isolation/Type: N/A  Tests/Procedures for NEXT shift: AM labs  Consults? (Pending/following, signed-off?) GI consult, PT, SW/CC following   Where is patient from? (Home, TCU, etc.): Pt lives w/ son, Anand. She lives in the upstairs of the home and he lives downstairs  Other Important info for NEXT shift: Can be anxious and forgetful, easily reassured w/ reminders of care plan and pain plan.   Anticipated DC date & active delays: TBD  _______________________  SUMMARY NOTE:  Orientation/Cognitive: A&Ox4, forgetful.   Observation Goals (Met/ Not Met): Inpatient   Mobility Level/Assist Equipment: Ax1 GB/walker  Antibiotics & Plan (IV/po, length of tx left): N/A  Pain Management: denies pain  Tele/VS/O2: VSS on RA. Tele: A-fib  ABNL Lab/BG: K+ 3.3 recheck at 1600 3.7, GFR 34, Creatinine 1.58, Na 134  Diet: Reg  Bowel/Bladder: Purewick per pt request, small output  Skin Concerns: Intact.  Drains/Devices: Left PIV infusing 0.9% & K+ 20 meq fluids at 100 ml/hr  Patient Stated Goal for Today: pain control

## 2024-12-10 NOTE — CONSULTS
[facility]  Gastroenterology Consultation         Grace Jorgensen  [address] [address]  74 year old female    Admission Date/Time: 12/6/2024  Primary Care Provider: Arnulfo Duffy  Referring / Attending Physician:  Dr. Eri Pinon    We were asked to see the patient in consultation by Dr. Eri Pinon for evaluation of nausea.      CC: nausea and abdominal pain    HPI:  Grace Jorgensen is a 74 year old female who has a PMHx of type 2 diabetes mellitus, paroxysmal A-fib on warfarin, HFpEF, cerebellar ataxia, gastroparesis s/p gastric neurostimulator placement.  Patient presented to the ER due to complaints of nausea and vomiting and abdominal pain.    Workup included: CT with Cholelithiasis and borderline gallbladder distention. No additional signs for cholecystitis. Reviewed CT C/A/P from 12/1/24, at that time note fluid filled esophagus of uncertain significance. RUQ US: Distended gallbladder. A 1.4 cm echogenic nonshadowing focus could represent a polyp, sludge ball or nonshadowing stone. No gallbladder wall thickening, pericholecystic fluid or tenderness over the GB. HIDA Scan 12/8/24 no evidence of acute cholecystitis     She reports that she had the battery replaced in gastric neuro stimulator 4 weeks ago and [facility]. Had the settings reduced to standard settings to preserve the battery. She had it adjusted when admitted one week ago here by our office. However after discharge had recurrent nausea and vomiting as well as RLQ that is greatly improved. C/o constipation and seems better if has a bowel movement. She no longer has any nausea or vomiting.    ROS: A comprehensive ten point review of systems was negative aside from those in mentioned in the HPI.      PAST MED HX:  I have reviewed this patient's medical history and updated it with pertinent information if needed.   Past Medical History:   Diagnosis Date    Cerebral infarction  (H)     Depressive disorder     Diabetes (H)     History of blood transfusion     Hypertension        MEDICATIONS:   Prior to Admission Medications   Prescriptions Last Dose Informant Patient Reported? Taking?   Aspirin Effervescent (AVA-SELTZER ORIGINAL PO) 12/5/2024 Son Yes Yes   Sig: Take 1-2 tablets by mouth every 4 hours as needed.   albuterol (PROAIR HFA/PROVENTIL HFA/VENTOLIN HFA) 108 (90 Base) MCG/ACT inhaler  Son Yes Yes   Sig: Inhale 1-2 puffs into the lungs every 4 hours as needed for shortness of breath, wheezing or cough.   amLODIPine (NORVASC) 5 MG tablet 12/5/2024 Son Yes Yes   Sig: Take 5 mg by mouth daily.   atorvastatin (LIPITOR) 20 MG tablet 12/5/2024 Son Yes Yes   Sig: Take 20 mg by mouth at bedtime   citalopram (CELEXA) 40 MG tablet 12/5/2024 Son Yes Yes   Sig: Take 40 mg by mouth daily   fluticasone (FLONASE) 50 MCG/ACT nasal spray  Son Yes Yes   Sig: Spray 1 spray into both nostrils daily as needed for rhinitis or allergies.   furosemide (LASIX) 20 MG tablet More than a month Son Yes Yes   Sig: Take 20 mg by mouth daily as needed (swelling).   gabapentin (NEURONTIN) 100 MG capsule 12/5/2024 Son Yes Yes   Sig: Take 200 mg by mouth 3 times daily.   ketoconazole (NIZORAL) 2 % external shampoo  Son Yes Yes   Sig: Every two weeks prn   lisinopril (ZESTRIL) 40 MG tablet 12/5/2024 Son Yes Yes   Sig: Take 40 mg by mouth daily.   melatonin 1 MG TABS tablet 12/5/2024 Son Yes Yes   Sig: Take 1 mg by mouth at bedtime.   metFORMIN (GLUCOPHAGE) 500 MG tablet 12/5/2024 Son Yes Yes   Sig: Take 500 mg by mouth daily (with breakfast)   metoclopramide (REGLAN) 5 MG tablet 12/5/2024  No Yes   Sig: Take 1 tablet (5 mg) by mouth 4 times daily as needed for vomiting.   metoprolol tartrate (LOPRESSOR) 25 MG tablet 12/5/2024  No Yes   Sig: Take 1 tablet (25 mg) by mouth 2 times daily.   multivitamin w/minerals (THERA-VIT-M) tablet 12/5/2024 Son No Yes   Sig: Take 1 tablet by mouth daily   pantoprazole (PROTONIX) 20  MG EC tablet 12/5/2024 Son Yes Yes   Sig: Take 20 mg by mouth 2 times daily.   polyethylene glycol (MIRALAX) 17 g packet  Son No Yes   Sig: Take 17 g by mouth 2 times daily as needed for constipation   rizatriptan (MAXALT-MLT) 10 MG ODT  Son Yes Yes   Sig: Take 10 mg by mouth at onset of headache for migraine.   simethicone (MYLICON) 80 MG chewable tablet  Son Yes Yes   Sig: Take 80 mg by mouth every 6 hours as needed for flatulence or cramping.   sucralfate (CARAFATE) 1 GM tablet Unknown Son Yes No   Sig: Take 1 g by mouth 4 times daily.   warfarin ANTICOAGULANT (COUMADIN) 5 MG tablet 12/5/2024 Son Yes Yes   Sig: Take by mouth daily. Take 15 mg Wed, Sat  Take 10 mg all other days      Facility-Administered Medications: None       ALLERGIES:   Allergies   Allergen Reactions    Bupropion GI Disturbance       SOCIAL HISTORY:  Social History     Tobacco Use    Smoking status: Former     Types: Cigarettes    Smokeless tobacco: Never   Vaping Use    Vaping status: Never Used   Substance Use Topics    Alcohol use: Not Currently    Drug use: Never       FAMILY HISTORY:  No family history on file.    PHYSICAL EXAM:   General  alert, oriented and comfortable  Vital Signs with Ranges  Temp: 98.1  F (36.7  C) Temp src: Oral BP: (!) 149/73 Pulse: 65   Resp: 20 SpO2: 93 % O2 Device: None (Room air)    I/O last 3 completed shifts:  In: 240 [P.O.:240]  Out: 250 [Urine:250]    Constitutional: Alert, oriented to person, place, date, situation.  Cooperative, lying in bed in NAD.   Respiratory:  Lungs CTAB.  No crackles, wheezes, or rhonchi, no labored breathing.  Cardiovascular:  Heart RRR, no MRG, no edema.  GI:  Abdomen soft, NT/ND and with normoactive BS  Skin/Integumen:  Warm, dry, non-diaphoretic.  MSK: CMS x4 intact.          ADDITIONAL COMMENTS:   I reviewed the patient's new clinical lab test results.   Recent Labs   Lab Test 12/10/24  0706 12/09/24  0647 12/08/24  1518 12/07/24  0848 12/06/24  0922 12/04/24  0843   WBC  --    --   --  7.5 6.3 11.8*   HGB  --   --   --  11.3* 11.2* 10.0*   MCV  --   --   --  88 84 85   PLT  --   --   --  376 405 342   INR 2.01* 1.79* 2.17* 3.92* 5.15* 3.01*     Recent Labs   Lab Test 12/10/24  0706 12/09/24  1632 12/09/24  0647 12/08/24  1518   POTASSIUM 5.8* 3.7 3.3* 3.8   CHLORIDE 114*  --  100 100   CO2 20*  --  23 21*   BUN 21.8  --  25.5* 19.1   ANIONGAP 5*  --  11 14     Recent Labs   Lab Test 12/09/24  1723 12/07/24  0848 12/06/24  1035 12/06/24  0922 12/02/24  0534 12/01/24  2151 12/01/24  1956   ALBUMIN  --  3.5  --  3.9 4.1  --  4.6   BILITOTAL  --  0.3  --  0.3 0.4  --  0.4   ALT  --  12  --  15 20  --  22   AST  --  15  --  23 38  --  34   PROTEIN 300*  --  200*  --   --  600*  --    LIPASE  --   --   --  20  --   --  20       I reviewed the patient's new imaging results.        CONSULTATION ASSESSMENT AND PLAN:    Grace Jorgensen is a 74 year old female who has a PMHx of type 2 diabetes mellitus, paroxysmal A-fib on warfarin, HFpEF, cerebellar ataxia, gastroparesis s/p gastric neurostimulator placement.  Patient presented to the ER with son due to complaints of nausea and vomiting and abdominal pain.     Gastroparesis  Nausea and vomiting  S/p gastric neurostimulator- with battery replacement 3 weeks ago and settings decreased  No known sick contacts  Current gastric neuro stimulator settings- off/on 0.1/5 seconds to 1/4 seconds   Had been on oxycodone and dilaudid that resulted in some confusion and likely exacerbated her nausea vomiting  Symptoms now resolved with no nausea or vomiting     - continue reglan 5 mg TID and can increase to 10 mg if nausea and vomiting recurs as outpatient  - Daily PPI  - low fiber diet  - will need to get external remote from clinic and adjust gastric pacemaker  - Ok to discharge from GI standpoint  - GI will sign off service. Please contact [facility] if any further GI service needed.   - Follow-up with [facility] in 1-2 weeks after discharge      Constipation  Abdominal pain- improved  has a BM 2-3 times a week   CT with Cholelithiasis and borderline gallbladder distention. No additional signs for cholecystitis. Reviewed CT C/A/P from 12/1/24, at that time note fluid filled esophagus of uncertain significance. No comment of cholelithiasis or GB distention at that time nor comment of renal pathology as below.   RUQ US: Distended gallbladder. A 1.4 cm echogenic nonshadowing focus could represent a polyp, sludge ball or nonshadowing stone. No gallbladder wall thickening, pericholecystic fluid or tenderness over the  HIDA Scan 12/8/24 no evidence of acute cholecystitis   On reglan and has minimal diarrhea likely due to this    - recommend to add in metamucil at home  - outpatient follow-up for constipation and nausea and vomiting with [facility] group [facility]  - Abdominal pain nearly resolved and requesting to be discharged          [facility].  Office: [phone]  Cell : [phone] (Dr. Dotson)  Cell: [phone] (Rupinder Rojo PA-C)

## 2024-12-10 NOTE — DISCHARGE SUMMARY
"[facility]  Hospitalist Discharge Summary      Date of Admission:  12/6/2024  Date of Discharge:  12/10/2024  Discharging Provider: King King NP  Discharge Service: Hospitalist Service    Discharge Diagnoses   Right sided abdominal pain  Intractable nausea and vomiting  Cholelithiasis  Gallbladder distention  Hiatal hernia  GERD  Constipation  Hypochloremia  Hyponatremia  CHI  Hypokalemia  Hyperkalemia  Microscopic hematuria     Clinically Significant Risk Factors     # Obesity: Estimated body mass index is 30.56 kg/m  as calculated from the following:    Height as of this encounter: 1.58 m (5' 2.21\").    Weight as of this encounter: 76.3 kg (168 lb 3.4 oz).       Follow-ups Needed After Discharge   Follow-up Appointments       Follow-up and recommended labs and tests       Follow up with primary care provider, Arnulfo Duffy, within 7 days for hospital follow- up.  The following labs/tests are recommended: UA with microscopic hematuria, CBC, BMP.    Follow up with your primary Gastroenterologist after hospitalization.                Unresulted Labs Ordered in the Past 30 Days of this Admission       Date and Time Order Name Status Description    12/9/2024  5:45 PM Urine Culture Preliminary         These results will be followed up by Hospitalist.    Discharge Disposition   Discharged to home  Condition at discharge: Stable    Hospital Course   Grace Jorgensen is a 74 year old female with below PMHx including recent admission from 12/2/24-12/4/24 for dehydration related to progressive N/V after her gastric neurostimulator settings were adjusted 3 weeks prior who re-presented 12/6/24 for further evaluation of R sided abdominal pain that developed on day of discharge and has persisted with associated nausea and general poor oral intake.      R Sided Abdominal Pain, Intractable Nausea and Vomiting  Gastroparesis s/p Gastric Neuro-stimulator Placement (L abdominal wall)  Cholelithiasis and " Borderline Gallbladder Distention  GERD  Hiatal hernia, small to moderate:  Constipation, has a BM 2-3 times a week  *Three weeks PTA had battery and settings changed with progression of sx. Note during last admission, [facility] was consulted and pt's stimulator settings were adjusted again, pt was discharged on Reglan.  In the ED, WBC 6.3, lactic 0.9, hepatic panel unremarkable. UA not indicative of infection.   *CT with Cholelithiasis and borderline gallbladder distention. No  additional signs for cholecystitis. Reviewed CT C/A/P from 12/1/24, at that time note fluid filled esophagus of uncertain significance. No comment of cholelithiasis or GB distention at that time nor comment of renal pathology as below.   *RUQ US: Distended gallbladder. A 1.4 cm echogenic nonshadowing focus could represent a polyp, sludge ball or nonshadowing stone. No gallbladder wall thickening, pericholecystic fluid or tenderness over the  *HIDA Scan 12/8/24 no evidence of acute cholecystitis    - Analgesic regimen                - scheduled Tylenol               - PRN Oxycodone 2.5mg                - no longer on IV Dilaudid as son feels like this may causing her to be confused                - Held the higher dose of PRN Oxycodone for same reason                - Recommend avoiding narcotics if possible given risk of delirium       - PRN Antiemetics   - Continue PPI (IV), carafate scheduled, simethicone as needed  - Schedule Reglan TID   - General Surgery conditions appreciated, I personally discussed management with the [facility] 12/9/2024 (no acute surgical intervention warranted) agree with scheduled Dulcolax, appears patient refused  - [facility] consulted to see if stimulator needs to be assessed; no changes made as patient no longer experiencing abd pain or n/v  -Scheduled MiraLAX twice daily at discharge     Likely Component of Delirium   Mild Cognitive Impairment  *Has intermittently been agitated both with staff and family, required IM  Zyprexa and verbal de-escalation on 12/7/2024  - Delirium precautions pudding but not limited to mobilization (encouraged son to bring in patient's walker which she prefers over the hospital use walker), frequent orientation, preservation of sleep-wake cycle, provision of sensory aids etc.   - Continue low dose Seroquel 25mg at bedtime   - PRN Zyprexa for agitation   - Sitter as needed   - PT consulted recommended either TCU versus home with home care PT suspect patient may prefer the latter     Hypochloremic  resolved  hyponatremia,recurrent but not as severe  CHI, worsening, +contrast exposure on 12/1 and 12/6/24 and recent poor p.o. intake  Hypokalemia  -Can resume PTA PRN lasix for swelling, and PTA lisinopril at discharge  -BMP at hospital follow up appointment      Hyperkalemia- resolved  K 5.9 on 12/10  - IVF changed from NS + 20 meq potassium to NS at 100 mL/h  - Given 500 ml bolus NS  - Repeat potassium 3.8.     Rash concerning for herpes simplex virus  - Rash on right side of skin outside of the rectum; appears to look like blisters, with serous drainage. Non-painful. This may be due to friction. She denies any sexual contacts. No other blisters in anal, or vaginal area.  - Could be dormant HSV; will treat with 5 day course acyclovir    CHI-resolved  Microscopic Hematuria from 12/6/24 UA  CT abdomen pelvis 12/6/2024 w/ nonobstructing right lower pole 3 mm calculus (4/56). Delayed right nephrogram. No significant collecting system dilatation or convincing ureteral calculus.  *No flank pain, urinary sx. Note UA from 12/1 was straight cathed, ? Trauma causing the microscopic hematuria as large blood noted at that time.   - Monitor clinically   - Repeat UA outpatient, if microscopic hematuria persists or pt develops flank pain or urinary sx will consult Urology, hold for now   - Urine culture positive for <10,000 gram neg. Bacilli. Holding off on antibiotics as asymptomatic. Can follow up with  PCP.    Paroxysmal Atrial Fibrillation on chronic anticoagulation with Coumadin  Labile INR  INR supratherapeutic on admission now subtherapeutic 12/09/24, no reversal just doses held, no active signs of bleeding. Hgb stable.   -Family reports patient has been using a lot of kecia seltzer at home; which can interfere with INR. This has been discontinued at discharge.   -INR 2.01 today 12/10.Discussed with pharmacist who recommended 5 mg Warfarin tonight, with follow up to INR clinic tomorrow. Patient has established care with INR clinic, and confirms her son will take her to the INR clinic 12/11.     Normocytic Anemia, chronic, baseline hgb 11-12 g/dL but improved from January 2024 when she was as low as 6.9-8.2 g/dL  - Monitor prn    Hepatic steatosis  - Noted on admission CT.      HFpEF, not in acute exacerbation  Severe coronary artery calcification per CT 12/1/24  Diffuse aoroiliac atheromatous disease per CT 12/1/24:   - Echo 12/2/24 with preserved EF, no RWMA, no significant valve disease, grade 1 or early diastolic dysfunciton.   - I/Os as above  - Daily weights  -2 g sodium diet would be ideal, patient does not like the hospital food at baseline, she may have home food     T2DM, non-insulin dependent, controlled: A1C 6.7 last admission.   Recent Labs   Lab 12/09/24  1237 12/09/24  0917 12/09/24  0647 12/09/24  0156 12/08/24  2055 12/08/24  1711   * 96 98 104* 108* 125*     - Can resume metformin at home      Hypertension  - Resume Lisinopril at discharge  - Continue PTA Norvasc and Lopressor as above both have hold parameters    Hyperlipidemia  -Can resume statin now that she is inpatient as of 12/9/2024     Depression  - Continue PTA Celexa      Cerebellar ataxia  Hx of CVA  - Uses a walker for ambulatory assistance patient prefers her own, son may bring this in       Consultations This Hospital Stay   SURGERY GENERAL IP CONSULT  PHYSICAL THERAPY ADULT IP CONSULT  CARE MANAGEMENT / SOCIAL WORK IP  CONSULT  CARE MANAGEMENT / SOCIAL WORK IP CONSULT  VASCULAR ACCESS ADULT IP CONSULT  GASTROENTEROLOGY IP CONSULT  PHARMACY TO DOSE WARFARIN    Code Status   Full Code    Time Spent on this Encounter   I, King King NP, personally saw the patient today and spent greater than 30 minutes discharging this patient.       King King NP  [facility] EXTENDED RECOVERY AND SHORT STAY  [address] [address]  Phone: [phone]  ______________________________________________________________________    Physical Exam   Vital Signs: Temp: 98.1  F (36.7  C) Temp src: Oral BP: (!) 149/73 Pulse: 65   Resp: 20 SpO2: 93 % O2 Device: None (Room air)    Weight: 168 lbs 3.38 oz  General:  Appears stated age, no acute distress. A&O x 3.  Skin:  Warm, dry. 1/2 dollar sized group of blisters on lateral right side of rectum; appear to have serous drainage.   HEENT:  Normocephalic, atraumatic; EOMs grossly intact.  Neck:  Supple.  Chest:  Breath sounds CTA and no increased work of breathing on room air.  Cardiovascular:  RRR, no rub or murmur. No peripheral edema.  Abdomen:  Soft, non-tender, non-distended.  Musculoskeletal:  Moves all four extremities. No muscle atrophy.  Neurological:  No new focal neurological deficits.   Psychiatric:  Affect and mood congruent.         Primary Care Physician   Arnulfo Duffy    Discharge Orders      INR     Home Care Referral      Anticoagulation Clinic Referral      Reason for your hospital stay    You were hospitalized for further evaluation of right abdominal pain. You had an extensive workup with did not reveal any acute causes for abdominal pain. You were seen by [facility] who did not recommend any adjustments to the gastric neurostimulator. You can follow up with [facility] or your primary gastroenterologist outpatient. You had some electrolyte imbalances, which required replacements. You will need labs checked at your PCP hospital follow up.      Follow-up and recommended labs and tests     Follow up with primary care provider, Arnulfo Duffy, within 7 days for hospital follow- up.  The following labs/tests are recommended: UA with microscopic hematuria, CBC, BMP.    Follow up with your primary Gastroenterologist after hospitalization.     Activity    Your activity upon discharge: activity as tolerated     Diet    Follow this diet upon discharge: Regular, 2g sodium diet       Significant Results and Procedures   Most Recent 3 CBC's:  Recent Labs   Lab Test 12/07/24  0848 12/06/24  0922 12/04/24  0843   WBC 7.5 6.3 11.8*   HGB 11.3* 11.2* 10.0*   MCV 88 84 85    405 342     Most Recent 3 BMP's:  Recent Labs   Lab Test 12/10/24  1207 12/10/24  1016 12/10/24  0752 12/10/24  0706 12/09/24  1715 12/09/24  1632 12/09/24  0917 12/09/24  0647 12/08/24  1711 12/08/24  1518   NA  --   --   --  139  --   --   --  134*  --  135   POTASSIUM  --  3.7  3.8  --  5.8*  --  3.7  --  3.3*  --  3.8   CHLORIDE  --   --   --  114*  --   --   --  100  --  100   CO2  --   --   --  20*  --   --   --  23  --  21*   BUN  --   --   --  21.8  --   --   --  25.5*  --  19.1   CR  --   --   --  0.94  --   --   --  1.58*  --  1.19*   ANIONGAP  --   --   --  5*  --   --   --  11  --  14   SERA  --   --   --  7.4*  --   --   --  9.3  --  9.2   *  --  105* 94   < >  --    < > 98   < > 114*    < > = values in this interval not displayed.   ,   Results for orders placed or performed during the hospital encounter of 12/06/24   CT Abdomen Pelvis w Contrast    Narrative    CT ABDOMEN AND PELVIS WITH CONTRAST 12/6/2024 10:16 AM    CLINICAL HISTORY: 2 days right abdomen pain, nausea, no urinary  symptoms.    TECHNIQUE: CT scan of the abdomen and pelvis was performed following  injection of IV contrast. Multiplanar reformats were obtained. Dose  reduction techniques were used.  CONTRAST: 88 mL Isovue 370    COMPARISON: CT chest, abdomen and pelvis 12/1/2024    FINDINGS:   LOWER CHEST:  Small to moderate-sized hiatal hernia. Bibasilar  atelectasis.    HEPATOBILIARY: Hepatic steatosis. Probable cholelithiasis. Borderline  gallbladder distention. No surrounding inflammation.    PANCREAS: No significant mass, duct dilatation, or inflammatory  change.    SPLEEN: Unremarkable.    ADRENAL GLANDS: No significant nodules.    KIDNEYS: Nonobstructing right lower pole 3 mm calculus (4/56). Delayed  right nephrogram. No significant collecting system dilatation or  convincing ureteral calculus.    BOWEL: Diverticulosis in the colon. No acute inflammatory change. No  obstruction.     VASCULATURE: Moderate aortic atherosclerosis.    LYMPH NODE AND PERITONEUM: No enlarged lymph node. No free fluid.    PELVIS: No pelvic mass.    MUSCULOSKELETAL: Similar left abdominal wall gastric stimulator. No  aggressive osseous lesion.    OTHER: None.      Impression    IMPRESSION:   1.  Delayed right nephrogram without significant collecting system  dilatation or radiopaque ureteral calculus. Clinically correlate and  correlate with urinalysis for recently passed stone and/or infection.  2.  Nonobstructing right renal calculus.  3.  Cholelithiasis and borderline gallbladder distention. No  additional signs for cholecystitis.  4.  Hepatic steatosis.    ALEIDA SAINI MD         SYSTEM ID:  [id number]   NM HepatOBiliary Scan    Narrative    EXAM: NM HEPATOBILIARY SCAN  LOCATION: [facility]  DATE: 12/08/2024    INDICATION: Rule out cholecystitis. No need for EF.  COMPARISON: Abdominal ultrasound on 12/06/2024 and CT chest, abdomen and pelvis on 12/01/2024.  TECHNIQUE: 6.2 mCi of Tc-99m mebrofenin, IV. Anterior planar imaging of the abdomen.     FINDINGS: Normal radionuclide activity in liver, gallbladder, bile ducts, and small bowel. No evidence of cystic or common duct obstruction or intrinsic liver disease.      Impression    IMPRESSION: No evidence of acute cholecystitis.     US Abdomen Limited     Narrative    US ABDOMEN LIMITED 12/6/2024 2:50 PM    CLINICAL HISTORY: Looking at gallbladder anatomy  TECHNIQUE: Limited abdominal ultrasound.    COMPARISON: CT earlier today    FINDINGS:    GALLBLADDER: Mildly distended gallbladder measuring 9.7 x 4.7 x 4.3  cm. A fold in the gallbladder fundus. A 1.4 x 1.3 x 0.6 cm echogenic  nonshadowing focus in the gallbladder could represent polyp, sludge  ball or stone. No gallbladder wall thickening. No pericholecystic  fluid. Sonographic [place] sign is negative.    BILE DUCTS: There is no biliary dilatation. The common duct measures  3mm.    LIVER: Diffuse increased parenchymal echogenicity consistent with  hepatic steatosis. Focal fat sparing in the gallbladder..    RIGHT KIDNEY: Mild pelviectasis. No calculi.    PANCREAS: The visualized portions of the pancreas are normal.      Impression    IMPRESSION:  1.  Distended gallbladder. A 1.4 cm echogenic nonshadowing focus could  represent a polyp, sludge ball or nonshadowing stone. No gallbladder  wall thickening, pericholecystic fluid or tenderness over the  gallbladder.  2.  Hepatic steatosis.    TAHIRA BARRON MD         SYSTEM ID:  [id number]       Discharge Medications   Current Discharge Medication List        START taking these medications    Details   acyclovir (ZOVIRAX) 400 MG tablet Take 1 tablet (400 mg) by mouth every 8 hours for 5 days.  Qty: 15 tablet, Refills: 0    Associated Diagnoses: Herpes simplex virus infection      oxyCODONE (ROXICODONE) 5 MG tablet Take 0.5 tablets (2.5 mg) by mouth every 8 hours as needed for moderate pain.  Qty: 8 tablet, Refills: 0    Associated Diagnoses: Right sided abdominal pain           CONTINUE these medications which have CHANGED    Details   polyethylene glycol (MIRALAX) 17 g packet Take 17 g by mouth 2 times daily.  Qty: 30 packet, Refills: 0    Associated Diagnoses: Gastrointestinal hemorrhage with melena      warfarin ANTICOAGULANT (COUMADIN) 5 MG tablet Take 1 tablet  (5 mg) by mouth daily. Take 15 mg Wed, Sat Take 10 mg all other days  Qty: 2 tablet, Refills: 0    Associated Diagnoses: Chronic atrial fibrillation (H)           CONTINUE these medications which have NOT CHANGED    Details   albuterol (PROAIR HFA/PROVENTIL HFA/VENTOLIN HFA) 108 (90 Base) MCG/ACT inhaler Inhale 1-2 puffs into the lungs every 4 hours as needed for shortness of breath, wheezing or cough.      amLODIPine (NORVASC) 5 MG tablet Take 5 mg by mouth daily.      atorvastatin (LIPITOR) 20 MG tablet Take 20 mg by mouth at bedtime      citalopram (CELEXA) 40 MG tablet Take 40 mg by mouth daily      fluticasone (FLONASE) 50 MCG/ACT nasal spray Spray 1 spray into both nostrils daily as needed for rhinitis or allergies.      furosemide (LASIX) 20 MG tablet Take 20 mg by mouth daily as needed (swelling).      gabapentin (NEURONTIN) 100 MG capsule Take 200 mg by mouth 3 times daily.      ketoconazole (NIZORAL) 2 % external shampoo Every two weeks prn      lisinopril (ZESTRIL) 40 MG tablet Take 40 mg by mouth daily.      melatonin 1 MG TABS tablet Take 1 mg by mouth at bedtime.      metFORMIN (GLUCOPHAGE) 500 MG tablet Take 500 mg by mouth daily (with breakfast)      metoclopramide (REGLAN) 5 MG tablet Take 1 tablet (5 mg) by mouth 4 times daily as needed for vomiting.  Qty: 20 tablet, Refills: 0    Associated Diagnoses: Dehydration      metoprolol tartrate (LOPRESSOR) 25 MG tablet Take 1 tablet (25 mg) by mouth 2 times daily.  Qty: 60 tablet, Refills: 0    Associated Diagnoses: Dehydration; Chronic heart failure with preserved ejection fraction (H)      multivitamin w/minerals (THERA-VIT-M) tablet Take 1 tablet by mouth daily    Associated Diagnoses: Iron deficiency anemia, unspecified iron deficiency anemia type      pantoprazole (PROTONIX) 20 MG EC tablet Take 20 mg by mouth 2 times daily.      rizatriptan (MAXALT-MLT) 10 MG ODT Take 10 mg by mouth at onset of headache for migraine.      simethicone (MYLICON) 80  MG chewable tablet Take 80 mg by mouth every 6 hours as needed for flatulence or cramping.      sucralfate (CARAFATE) 1 GM tablet Take 1 g by mouth 4 times daily.           STOP taking these medications       Aspirin Effervescent (AVA-SELTZER ORIGINAL PO) Comments:   Reason for Stopping:             Allergies   Allergies   Allergen Reactions    Bupropion GI Disturbance

## 2024-12-10 NOTE — PLAN OF CARE
PRIMARY Concern: R-sided abdominal pain.   SAFETY RISK Concerns (fall risk, behaviors, etc.): Fall risk      Aggression Tool Color: Green  Isolation/Type: N/A  Tests/Procedures for NEXT shift: AM labs  Consults? (Pending/following, signed-off?) GI consult, PT, SW/CC following   Where is patient from? (Home, TCU, etc.): Pt lives w/ son, Anand  Other Important info for NEXT shift: Can be anxious and forgetful.   Anticipated DC date & active delays:  SUMMARY NOTE:  Orientation/Cognitive: A&Ox4, forgetful.   Observation Goals (Met/ Not Met): Inpatient   Mobility Level/Assist Equipment: Ax1 GB/walker  Antibiotics & Plan (IV/po, length of tx left): N/A  Pain Management: denies pain  Tele/VS/O2: VSS on RA. Tele: A-fib  ABNL Lab/BG:  Diet: Reg  Bowel/Bladder: Purewick per pt request  Skin Concerns: Intact.  Drains/Devices: Left PIV infusing 0.9% & K+ 20 meq fluids at 100 ml/hr  Patient Stated Goal for Today:Sleep

## 2024-12-10 NOTE — PROGRESS NOTES
Care Management Discharge Note    Discharge Date: 12/10/2024       Discharge Disposition: Home Care    Discharge Services: Home Care    Discharge DME:      Discharge Transportation: family or friend will provide    Private pay costs discussed: Not applicable    Does the patient's insurance plan have a 3 day qualifying hospital stay waiver?  Yes     Which insurance plan 3 day waiver is available? Alternative insurance waiver    Will the waiver be used for post-acute placement? No    PAS Confirmation Code:    Patient/family educated on Medicare website which has current facility and service quality ratings: yes    Education Provided on the Discharge Plan:    Persons Notified of Discharge Plans: Hospitalist, pt  Patient/Family in Agreement with the Plan: yes    Handoff Referral Completed: No, handoff not indicated or clinically appropriate    Additional Information:  Patient is discharging to home with home care, [facility] accepted for RN/PT/OT. Discharge orders faxed.    MONSTER Reynolds  Social Work  [facility]

## 2024-12-11 ENCOUNTER — TELEPHONE (OUTPATIENT)
Dept: ANTICOAGULATION | Facility: CLINIC | Age: 74
End: 2024-12-11
Payer: COMMERCIAL

## 2024-12-11 ENCOUNTER — TELEPHONE (OUTPATIENT)
Dept: FAMILY MEDICINE | Facility: CLINIC | Age: 74
End: 2024-12-11
Payer: COMMERCIAL

## 2024-12-11 DIAGNOSIS — I48.20 CHRONIC ATRIAL FIBRILLATION (H): Primary | ICD-10-CM

## 2024-12-11 PROBLEM — N30.01 ACUTE CYSTITIS WITH HEMATURIA: Status: ACTIVE | Noted: 2024-12-11

## 2024-12-11 LAB
HSV1 DNA SPEC QL NAA+PROBE: NOT DETECTED
HSV2 DNA SPEC QL NAA+PROBE: NOT DETECTED
SPECIMEN TYPE: NORMAL

## 2024-12-11 NOTE — TELEPHONE ENCOUNTER
"ANTICOAGULATION  MANAGEMENT: Discharge Review    Grace Jorgensen chart reviewed for anticoagulation continuity of care    Hospital Admission on 12/6/24-12/10/24 for abdominal pain, poor oral intake, and dehydration r/t progressive N/V after her gastric neurostimulator settings were adjusted 3 weeks prior.    Discharge disposition: Home    Results:    Recent labs: (last 7 days)     12/06/24  0922 12/07/24  0848 12/08/24  1518 12/09/24  0647 12/10/24  0706   INR 5.15* 3.92* 2.17* 1.79* 2.01*     Anticoagulation inpatient management:     anticoagulation calendar updated with inpatient dosing    Anticoagulation discharge instructions:     Warfarin dosing: decrease dose to 5 mg 12/10/24 & advised to f/u with [facility] for further instructions   Bridging: No   INR goal change: No      Medication changes affecting anticoagulation: No    Additional factors affecting anticoagulation: Yes: discharge notes mentioned \"has been using a lot of kecia seltzer at home.\" PTA warfarin dosing: 15 mg Wed, Sat + 10 mg AOD. Previously managed by [facility]     PLAN     Spoke with patient and confirmed plans to follow-up with Dr. Duffy and ACC team at  - patient hopes to have INR checked yet today or tomorrow.    No adjustment to Anticoagulation Calendar was required - patient discharge and ACC episode closed    Mary Montiel RN  12/11/2024  Anticoagulation Clinic  [facility] for routing messages: taurus ACEVEDO [facility]  ACC patient phone line: [phone]  "

## 2024-12-12 LAB
BACTERIA UR CULT: ABNORMAL

## 2025-07-30 NOTE — PLAN OF CARE
Chief complaint   Chief Complaint   Patient presents with    Type 2 diabetes mellitus with other diabetic kidney complic          Subjective     History of Present Illness:          This is a 74 year old female I am seeing for type 2 DM   referred by PCP   Last OV was 10 weeks ago   She is here for a follow up   other medical history is   1- HTN   2- HLD   3- CKD   4- H/o Stroke   Pt had T2DM many years ago   Current home medication for diabetes     Off Januvia and Glyburide 5 mg daily , she is taking it 2 times   Jardiance 25 mg daily to help with CKD   Rybelsus 7 mg daily  Actos 45 mg daily   Lantus 8 units PM     the A1c was 7.3 in   the A1c was 8.2 in   the A1c was 8.6 in   Checks blood sugar at home using Cgm   Checks blood sugar 1-4 times per day   SBMG: ave is 133 with 91 % in range and 0 % low and rest is high    pt states that she is having better BG now , did  take her pills every day after the last visit and  trying her best with dietary Compliance, in regards to Exercise, not on a daily basis, twice a week  and his Weight has been the same and there is No Hypoglycemic episodes, there is no AM Headaches /Nightmares, no Polyuria / Polydipsia, and there os no Blurring of Vision  Last Dilated Pupil Exam, in  , no DM in the eye   NoTingling / numbness in feet, No Dizziness / lightheadedness, No Falls  No Nausea, vomiting / Diarrhea  retired.            Family History   Problem Relation Age of Onset    Early death Mother          from aneurysym    Stroke Father     Colon polyps Sister     Diabetes Sister     Hypertension Sister     Cancer Sister     Colon cancer Brother     Diabetes Brother     Kidney disease Brother     Malig Hyperthermia Neg Hx      Social History     Socioeconomic History    Marital status:    Tobacco Use    Smoking status: Never     Passive exposure: Never    Smokeless tobacco: Never    Tobacco comments:     caffeine use 2 cups coffee daily   Vaping Use  "Physical Therapy Discharge Summary    Reason for therapy discharge:    Discharged to home with home therapy.    Progress towards therapy goal(s). See goals on Care Plan in [facility] electronic health record for goal details.  Goals not met.  Barriers to achieving goals:   discharge from facility.    Therapy recommendation(s):    Continued therapy is recommended.  Rationale/Recommendations:  Per prior PT notes, \"Patient with very limited activity tolerance, unsteady and at increased falls risk after 30 ft ambulation due to LE fatigue. Recommend TCU to promote safety and independence with mobility unless patient is able to arrange increased assistance from family. She reports she lives with her son but he does work during the day. With increased assist, consider discharge home with HHPT to address remaining strength, balance, and endurance deficits.\".    Patient not seen by this therapist on this date, discharge summary written based on chart review and previous PT notes. Please see PT flowsheets for further details.        "    Vaping status: Never Used   Substance and Sexual Activity    Alcohol use: Yes     Alcohol/week: 4.0 standard drinks of alcohol     Types: 4 Glasses of wine per week     Comment: daily glass of wine    Drug use: No    Sexual activity: Not Currently     Partners: Male     Birth control/protection: Condom, Tubal ligation, Vaginal insert contraception     Past Medical History:   Diagnosis Date    Anemia     CKD (chronic kidney disease)     Deep vein thrombosis     Diabetes mellitus     GERD (gastroesophageal reflux disease)     Hyperlipidemia     Hypertension     Neuropathy in diabetes 2023    Peripheral neuropathy     Rectal bleeding 08/25/2022    FIT test performed at PCP    Stroke 06/29/2022    patient states she feels some right side weakness from stroke, not noticable to others.    Type 2 diabetes mellitus      Past Surgical History:   Procedure Laterality Date    CATARACT EXTRACTION Bilateral 06/2021    COLONOSCOPY N/A 09/12/2018    Procedure: COLONOSCOPY TO CECUM;  Surgeon: Josh Muñoz MD;  Location: [facility] ENDOSCOPY;  Service: General    COLONOSCOPY N/A 11/30/2022    Procedure: COLONOSCOPY TO CECUM AND TI;  Surgeon: Duglas Zhang MD;  Location: [facility] ENDOSCOPY;  Service: General;  Laterality: N/A;  POSITIVE FIT TEST  --DIVERTICULOSIS     DILATION AND CURETTAGE, DIAGNOSTIC / THERAPEUTIC N/A 2018    ENDOSCOPY N/A 09/12/2018    Procedure: ESOPHAGOGASTRODUODENOSCOPY WITH BIOPSIES;  Surgeon: Josh Muñoz MD;  Location: [facility] ENDOSCOPY;  Service: General    ENDOSCOPY N/A 11/30/2022    Procedure: ESOPHAGOGASTRODUODENOSCOPY;  Surgeon: Duglas Zhang MD;  Location: [facility] ENDOSCOPY;  Service: General;  Laterality: N/A;  NORMAL     ROTATOR CUFF REPAIR Right     TOTAL HIP ARTHROPLASTY Right 05/04/2024    Procedure: TOTAL HIP ARTHROPLASTY ANTERIOR WITH HANA TABLE;  Surgeon: Kike Lake MD;  Location: [facility] MAIN OR;  Service: Orthopedics;  Laterality: Right;    TUBAL ABDOMINAL LIGATION          Current Outpatient Medications:     Accu-Chek Jaycee Plus test strip, , Disp: , Rfl:     amLODIPine (NORVASC) 10 MG tablet, TAKE 1 TABLET ONE TIME DAILY, Disp: , Rfl:     aspirin 81 MG EC tablet, Take 1 tablet by mouth Every 12 (Twelve) Hours. Indications: VTE Prophylaxis, Disp: 60 tablet, Rfl: 0    atorvastatin (LIPITOR) 80 MG tablet, Take 0.5 tablets by mouth Every Night., Disp: , Rfl:     Continuous Glucose Sensor (FreeStyle Haydee 3 Plus Sensor), USE AS DIRECTED - CHANGE SENSOR EVERY 14 DAYS AS DIRECTED, Disp: 6 each, Rfl: 0    cyanocobalamin (VITAMIN B-12) 1000 MCG tablet, Take 1 tablet by mouth Daily., Disp: 30 tablet, Rfl: 1    empagliflozin (Jardiance) 25 MG tablet tablet, Take 1 tablet by mouth Daily., Disp: 90 tablet, Rfl: 0    gabapentin (NEURONTIN) 100 MG capsule, 200 mg in a.m. 100-200mg midday 400 mg nightly if tolerated, Disp: 360 capsule, Rfl: 1    Ibuprofen 3 %, Gabapentin 10 %, Baclofen 2 %, lidocaine 4 %, Ketamine HCl 10 %, Apply 1-2 g topically to the appropriate area as directed 3 (Three) to 4 (Four) times daily., Disp: 90 g, Rfl: 5    Insulin Glargine (LANTUS SOLOSTAR) 100 UNIT/ML injection pen, Inject 8 Units under the skin into the appropriate area as directed Daily., Disp: 15 mL, Rfl: 0    Insulin Pen Needle (BD Pen Needle Bety U/F) 32G X 4 MM misc, Use 1 Units Daily., Disp: 100 each, Rfl: 0    lidocaine (LIDODERM) 5 %, PLACE 1 PATCH ONTO THE SKIN DAILY. 12/HRS A DAY, Disp: , Rfl:     multivitamins-minerals (PRESERVISION AREDS 2) capsule capsule, Take 2 capsules by mouth Daily., Disp: , Rfl:     pantoprazole (PROTONIX) 40 MG EC tablet, Take 1 tablet by mouth Every Morning., Disp: 30 tablet, Rfl: 1    pioglitazone (Actos) 30 MG tablet, Take 1 tablet by mouth Daily., Disp: 90 tablet, Rfl: 0    ramipril (ALTACE) 10 MG capsule, Take 1 capsule by mouth Daily. (Patient taking differently: Take 1 capsule by mouth 2 (Two) Times a Day.), Disp: 30 capsule, Rfl: 1    Semaglutide (Rybelsus) 7 MG  tablet, Take 7 mg by mouth Daily., Disp: 90 tablet, Rfl: 0  Penicillins    Objective   Vitals:    07/30/25 1320   BP: 118/64   Pulse: 84   SpO2: 95%                  Physical Exam  Neurological:      General: No focal deficit present.      Mental Status: She is alert and oriented to person, place, and time.               Diagnoses and all orders for this visit:    1. Type 2 diabetes mellitus with other diabetic kidney complication, without long-term current use of insulin (Primary)  -     Hemoglobin A1c  -     empagliflozin (Jardiance) 25 MG tablet tablet; Take 1 tablet by mouth Daily.  Dispense: 90 tablet; Refill: 0  -     Discontinue: pioglitazone (Actos) 45 MG tablet; Take 1 tablet by mouth Daily.  Dispense: 90 tablet; Refill: 0  -     Semaglutide (Rybelsus) 7 MG tablet; Take 7 mg by mouth Daily.  Dispense: 90 tablet; Refill: 0  -     Insulin Glargine (LANTUS SOLOSTAR) 100 UNIT/ML injection pen; Inject 8 Units under the skin into the appropriate area as directed Daily.  Dispense: 15 mL; Refill: 0  -     pioglitazone (Actos) 30 MG tablet; Take 1 tablet by mouth Daily.  Dispense: 90 tablet; Refill: 0                 Assessment:     1- DM, Type 2  Uncontrolled with High risk , getting better   has CKD , follows with nephrology     labs on file , last GFR was 39 in   We discussed the medications  Hypoglycemia and its importance was reviewed   Labs where shown to the patient   2. Hypertension, on Amlodipine    3. Hyperlipidemia, on a statin           Plan:    1. Diet, exercise and weight management  2. Consistent food intake to avoid low blood sugars  3. Home medication includes for diabetes     Jardiance 25 mg daily to help with CKD   Rybelsus 7 mg daily   Change to   Actos 30 mg daily   Stay on Lantus 8 units once a day   Was asked to call me if having lows     4. Consistent checking of blood sugars using Haydee   5. I reviewed the last progress note  6. Annual eye exam  7. Return in 3 months   8.  Rx sent to  the pharmacy  10. Labs : A1c every 90 days       I discussed with the patient/legal representative the risks and the benefits associated with the medications.  The patient/legal representative has been given the opportunity to ask questions.  Alternatives to the proposed treatment (s) were discussed, including the likely results of no treatment.  The patient/legal representative wishes to proceed.       Alvaro Ivory MD   07/30/25  13:33 EDT

## (undated) DEVICE — LINEN FULL SHEET 5511

## (undated) DEVICE — DRAPE LAP PEDS DISP 29492

## (undated) DEVICE — TRAY PREP DRY SKIN SCRUB 067

## (undated) DEVICE — PREP POVIDONE IODINE SOLUTION 10% 4OZ BOTTLE 29906-004

## (undated) DEVICE — PACK MINOR CUSTOM RIDGES SBA32RMRMA

## (undated) DEVICE — GLOVE BIOGEL PI MICRO INDICATOR UNDERGLOVE SZ 7.5 48975

## (undated) DEVICE — PREP POVIDONE-IODINE 7.5% SCRUB 4OZ BOTTLE MDS093945

## (undated) DEVICE — ESU GROUND PAD ADULT W/CORD E7507

## (undated) DEVICE — GLOVE BIOGEL PI MICRO SZ 7.5 48575

## (undated) DEVICE — SU VICRYL 3-0 TIE 12X18" J904T

## (undated) DEVICE — SU DERMABOND ADVANCED .7ML DNX12

## (undated) DEVICE — LINEN DRAPE 54X72" 5467

## (undated) DEVICE — DRSG TELFA 2X3"

## (undated) DEVICE — SU VICRYL 3-0 SH 27" UND J416H

## (undated) DEVICE — SU VICRYL 3-0 RB-1 27" UND J215H

## (undated) DEVICE — LINEN TOWEL PACK X10 5473

## (undated) DEVICE — BAG CLEAR TRASH 1.3M 39X33" P4040C

## (undated) DEVICE — SU VICRYL 4-0 PS-2 18" UND J496H

## (undated) DEVICE — LINEN HALF SHEET 5512

## (undated) DEVICE — GEL ULTRASOUND AQUASONIC 20GM 01-01

## (undated) DEVICE — SYR 10ML SLIP TIP W/O NDL 303134

## (undated) DEVICE — NDL 27GA 1.25" 305136

## (undated) RX ORDER — FENTANYL CITRATE 50 UG/ML
INJECTION, SOLUTION INTRAMUSCULAR; INTRAVENOUS
Status: DISPENSED
Start: 2024-01-24

## (undated) RX ORDER — FENTANYL CITRATE 50 UG/ML
INJECTION, SOLUTION INTRAMUSCULAR; INTRAVENOUS
Status: DISPENSED
Start: 2024-01-23